# Patient Record
Sex: FEMALE | Race: WHITE | Employment: OTHER | RURAL
[De-identification: names, ages, dates, MRNs, and addresses within clinical notes are randomized per-mention and may not be internally consistent; named-entity substitution may affect disease eponyms.]

---

## 2017-04-11 ENCOUNTER — HOSPITAL ENCOUNTER (OUTPATIENT)
Dept: GENERAL RADIOLOGY | Age: 82
Discharge: HOME | End: 2017-04-11
Attending: PHYSICIAN ASSISTANT | Admitting: PHYSICIAN ASSISTANT

## 2017-04-11 DIAGNOSIS — R22.31 ARM MASS, RIGHT: ICD-10-CM

## 2017-04-11 DIAGNOSIS — R22.31 LOCALIZED SWELLING, MASS AND LUMP, RIGHT UPPER LIMB: ICD-10-CM

## 2017-04-11 DIAGNOSIS — M79.601 RIGHT ARM PAIN: ICD-10-CM

## 2017-06-27 ENCOUNTER — HOSPITAL ENCOUNTER (OUTPATIENT)
Dept: OTHER | Age: 82
Discharge: HOME | End: 2017-06-27
Attending: PHYSICIAN ASSISTANT | Admitting: PHYSICIAN ASSISTANT

## 2017-06-27 DIAGNOSIS — Z12.31 ENCOUNTER FOR SCREENING MAMMOGRAM FOR BREAST CANCER: ICD-10-CM

## 2017-06-27 DIAGNOSIS — Z12.39 BREAST CANCER SCREENING: ICD-10-CM

## 2018-04-08 ENCOUNTER — APPOINTMENT (OUTPATIENT)
Dept: CT IMAGING | Facility: HOSPITAL | Age: 83
End: 2018-04-08

## 2018-04-08 ENCOUNTER — HOSPITAL ENCOUNTER (EMERGENCY)
Facility: HOSPITAL | Age: 83
Discharge: HOME | End: 2018-04-08
Attending: EMERGENCY MEDICINE | Admitting: EMERGENCY MEDICINE

## 2018-04-08 VITALS
SYSTOLIC BLOOD PRESSURE: 119 MMHG | TEMPERATURE: 97.8 F | OXYGEN SATURATION: 98 % | RESPIRATION RATE: 18 BRPM | HEIGHT: 59 IN | WEIGHT: 143 LBS | HEART RATE: 56 BPM | DIASTOLIC BLOOD PRESSURE: 98 MMHG | BODY MASS INDEX: 28.83 KG/M2

## 2018-04-08 DIAGNOSIS — N30.00 ACUTE CYSTITIS WITHOUT HEMATURIA: Primary | ICD-10-CM

## 2018-04-08 LAB
ALBUMIN SERPL-MCNC: 3.6 G/DL (ref 3.5–5)
ALBUMIN/GLOB SERPL: 1 G/DL (ref 1–2)
ALP SERPL-CCNC: 74 U/L (ref 38–126)
ALT SERPL W P-5'-P-CCNC: 44 U/L (ref 13–69)
ANION GAP SERPL CALCULATED.3IONS-SCNC: 18.6 MMOL/L (ref 10–20)
AST SERPL-CCNC: 37 U/L (ref 15–46)
BACTERIA UR QL AUTO: ABNORMAL /HPF
BASOPHILS # BLD AUTO: 0.02 10*3/MM3 (ref 0–0.2)
BASOPHILS NFR BLD AUTO: 0.2 % (ref 0–2.5)
BILIRUB SERPL-MCNC: 0.5 MG/DL (ref 0.2–1.3)
BILIRUB UR QL STRIP: NEGATIVE
BUN BLD-MCNC: 61 MG/DL (ref 7–20)
BUN/CREAT SERPL: 21 (ref 7.1–23.5)
CALCIUM SPEC-SCNC: 9.3 MG/DL (ref 8.4–10.2)
CHLORIDE SERPL-SCNC: 102 MMOL/L (ref 98–107)
CLARITY UR: ABNORMAL
CO2 SERPL-SCNC: 23 MMOL/L (ref 26–30)
COLOR UR: YELLOW
CREAT BLD-MCNC: 2.9 MG/DL (ref 0.6–1.3)
DEPRECATED RDW RBC AUTO: 44 FL (ref 37–54)
EOSINOPHIL # BLD AUTO: 0.28 10*3/MM3 (ref 0–0.7)
EOSINOPHIL NFR BLD AUTO: 3.3 % (ref 0–7)
ERYTHROCYTE [DISTWIDTH] IN BLOOD BY AUTOMATED COUNT: 13.3 % (ref 11.5–14.5)
GFR SERPL CREATININE-BSD FRML MDRD: 15 ML/MIN/1.73
GLOBULIN UR ELPH-MCNC: 3.7 GM/DL
GLUCOSE BLD-MCNC: 197 MG/DL (ref 74–98)
GLUCOSE UR STRIP-MCNC: NEGATIVE MG/DL
HCT VFR BLD AUTO: 35 % (ref 37–47)
HGB BLD-MCNC: 11.4 G/DL (ref 12–16)
HGB UR QL STRIP.AUTO: ABNORMAL
HOLD SPECIMEN: NORMAL
HOLD SPECIMEN: NORMAL
HYALINE CASTS UR QL AUTO: ABNORMAL /LPF
IMM GRANULOCYTES # BLD: 0.02 10*3/MM3 (ref 0–0.06)
IMM GRANULOCYTES NFR BLD: 0.2 % (ref 0–0.6)
KETONES UR QL STRIP: ABNORMAL
LEUKOCYTE ESTERASE UR QL STRIP.AUTO: ABNORMAL
LYMPHOCYTES # BLD AUTO: 1.56 10*3/MM3 (ref 0.6–3.4)
LYMPHOCYTES NFR BLD AUTO: 18.6 % (ref 10–50)
MCH RBC QN AUTO: 29.4 PG (ref 27–31)
MCHC RBC AUTO-ENTMCNC: 32.6 G/DL (ref 30–37)
MCV RBC AUTO: 90.2 FL (ref 81–99)
MONOCYTES # BLD AUTO: 1.51 10*3/MM3 (ref 0–0.9)
MONOCYTES NFR BLD AUTO: 18 % (ref 0–12)
NEUTROPHILS # BLD AUTO: 5 10*3/MM3 (ref 2–6.9)
NEUTROPHILS NFR BLD AUTO: 59.7 % (ref 37–80)
NITRITE UR QL STRIP: NEGATIVE
NRBC BLD MANUAL-RTO: 0 /100 WBC (ref 0–0)
PH UR STRIP.AUTO: <=5 [PH] (ref 5–8)
PLATELET # BLD AUTO: 140 10*3/MM3 (ref 130–400)
PMV BLD AUTO: 11.4 FL (ref 6–12)
POTASSIUM BLD-SCNC: 4.6 MMOL/L (ref 3.5–5.1)
PROT SERPL-MCNC: 7.3 G/DL (ref 6.3–8.2)
PROT UR QL STRIP: ABNORMAL
RBC # BLD AUTO: 3.88 10*6/MM3 (ref 4.2–5.4)
RBC # UR: ABNORMAL /HPF
REF LAB TEST METHOD: ABNORMAL
SODIUM BLD-SCNC: 139 MMOL/L (ref 137–145)
SP GR UR STRIP: >=1.03 (ref 1–1.03)
SQUAMOUS #/AREA URNS HPF: ABNORMAL /HPF
TROPONIN I SERPL-MCNC: <0.012 NG/ML (ref 0–0.03)
UROBILINOGEN UR QL STRIP: ABNORMAL
WBC NRBC COR # BLD: 8.39 10*3/MM3 (ref 4.8–10.8)
WBC UR QL AUTO: ABNORMAL /HPF
WHOLE BLOOD HOLD SPECIMEN: NORMAL
WHOLE BLOOD HOLD SPECIMEN: NORMAL

## 2018-04-08 PROCEDURE — 87086 URINE CULTURE/COLONY COUNT: CPT | Performed by: EMERGENCY MEDICINE

## 2018-04-08 PROCEDURE — 99283 EMERGENCY DEPT VISIT LOW MDM: CPT

## 2018-04-08 PROCEDURE — 87186 SC STD MICRODIL/AGAR DIL: CPT | Performed by: EMERGENCY MEDICINE

## 2018-04-08 PROCEDURE — 84484 ASSAY OF TROPONIN QUANT: CPT | Performed by: EMERGENCY MEDICINE

## 2018-04-08 PROCEDURE — 85025 COMPLETE CBC W/AUTO DIFF WBC: CPT | Performed by: EMERGENCY MEDICINE

## 2018-04-08 PROCEDURE — 80053 COMPREHEN METABOLIC PANEL: CPT | Performed by: EMERGENCY MEDICINE

## 2018-04-08 PROCEDURE — 81001 URINALYSIS AUTO W/SCOPE: CPT | Performed by: EMERGENCY MEDICINE

## 2018-04-08 PROCEDURE — 87077 CULTURE AEROBIC IDENTIFY: CPT | Performed by: EMERGENCY MEDICINE

## 2018-04-08 PROCEDURE — 93005 ELECTROCARDIOGRAM TRACING: CPT | Performed by: EMERGENCY MEDICINE

## 2018-04-08 PROCEDURE — 70450 CT HEAD/BRAIN W/O DYE: CPT

## 2018-04-08 RX ORDER — GLIPIZIDE 10 MG/1
20 TABLET ORAL
COMMUNITY

## 2018-04-08 RX ORDER — TRIAMTERENE AND HYDROCHLOROTHIAZIDE 37.5; 25 MG/1; MG/1
1 CAPSULE ORAL 3 TIMES WEEKLY
COMMUNITY
End: 2018-04-20 | Stop reason: HOSPADM

## 2018-04-08 RX ORDER — ACETAMINOPHEN 325 MG/1
650 TABLET ORAL EVERY 6 HOURS PRN
COMMUNITY

## 2018-04-08 RX ORDER — DILTIAZEM HYDROCHLORIDE 120 MG/1
120 TABLET, FILM COATED ORAL DAILY
COMMUNITY
End: 2018-04-20 | Stop reason: HOSPADM

## 2018-04-08 RX ORDER — CEPHALEXIN 500 MG/1
500 CAPSULE ORAL 2 TIMES DAILY
Qty: 14 CAPSULE | Refills: 0 | Status: SHIPPED | OUTPATIENT
Start: 2018-04-08 | End: 2018-04-20 | Stop reason: HOSPADM

## 2018-04-08 RX ORDER — ECHINACEA 400 MG
2000 CAPSULE ORAL 2 TIMES DAILY
COMMUNITY
End: 2022-12-19

## 2018-04-08 RX ORDER — CHLORAL HYDRATE 500 MG
1200 CAPSULE ORAL 2 TIMES DAILY WITH MEALS
COMMUNITY
End: 2022-12-19

## 2018-04-08 RX ORDER — LEVOTHYROXINE SODIUM 88 UG/1
100 TABLET ORAL DAILY
COMMUNITY
End: 2020-10-01

## 2018-04-08 RX ORDER — QUINAPRIL 20 MG/1
10 TABLET ORAL NIGHTLY
COMMUNITY
End: 2018-04-20 | Stop reason: HOSPADM

## 2018-04-08 RX ORDER — LANOLIN ALCOHOL/MO/W.PET/CERES
1000 CREAM (GRAM) TOPICAL
COMMUNITY

## 2018-04-08 RX ORDER — ASPIRIN 81 MG/1
81 TABLET ORAL DAILY
COMMUNITY

## 2018-04-08 RX ORDER — ASCORBIC ACID 500 MG
1000 TABLET ORAL DAILY
COMMUNITY

## 2018-04-08 RX ORDER — RANITIDINE 150 MG/1
150 TABLET ORAL 2 TIMES DAILY
COMMUNITY
End: 2020-10-01

## 2018-04-08 RX ORDER — SIMVASTATIN 10 MG
10 TABLET ORAL NIGHTLY
COMMUNITY

## 2018-04-08 RX ORDER — CEPHALEXIN 250 MG/1
500 CAPSULE ORAL ONCE
Status: COMPLETED | OUTPATIENT
Start: 2018-04-08 | End: 2018-04-08

## 2018-04-08 RX ORDER — IRON POLYSACCHARIDE COMPLEX 150 MG
150 CAPSULE ORAL DAILY
COMMUNITY
End: 2022-08-30

## 2018-04-08 RX ADMIN — CEPHALEXIN 500 MG: 250 CAPSULE ORAL at 18:53

## 2018-04-08 NOTE — ED PROVIDER NOTES
Subjective   History of Present Illness   87-year-old female history of diabetes, hypertension, coronary artery disease, and kidney disease presenting with a week of fatigue, generalized weakness, body aches, and several days of dysuria, nausea and a simple fall 3 days ago.  States that she lost her balance and fell hitting her head on the floor. Denies any loss of consciousness, subsequent vomiting, memory loss, changes in vision, weakness or numbness in arms or legs or use of blood thinners.  She is supposed to use a walker but refuses to do so.      Review of Systems   Constitutional: Positive for fatigue.   Gastrointestinal: Positive for nausea.   Genitourinary: Positive for dysuria.   Musculoskeletal: Positive for myalgias.   All other systems reviewed and are negative.      Past Medical History:   Diagnosis Date   • Diabetes mellitus    • Hyperlipidemia    • Hypertension    • Myocardial infarction    • Renal failure        No Known Allergies    Past Surgical History:   Procedure Laterality Date   • CATARACT EXTRACTION     •  SECTION         History reviewed. No pertinent family history.    Social History     Social History Main Topics   • Smoking status: Never Smoker   • Alcohol use No   • Drug use: No   • Sexual activity: Defer     Other Topics Concern   • Not on file           Objective   Physical Exam   Constitutional: She is oriented to person, place, and time. She appears well-developed and well-nourished. No distress.   HENT:   Head: Normocephalic.   Mouth/Throat: Oropharynx is clear and moist. No oropharyngeal exudate.   Ecchymosis over left parietal scalp. No hemotympanum, montgomery's sign nor raccoon eyes.      Eyes: EOM are normal. No scleral icterus.   Neck: Neck supple. No tracheal deviation present.   No tenderness palpation of the midline of the cervical spine.   Cardiovascular: Normal rate, regular rhythm, normal heart sounds and intact distal pulses.  Exam reveals no gallop and no friction  rub.    No murmur heard.  Pulmonary/Chest: Effort normal and breath sounds normal. No stridor. No respiratory distress. She has no wheezes. She has no rales. She exhibits no tenderness.   Abdominal: Soft. She exhibits no distension and no mass. There is no tenderness. There is no rebound and no guarding. No hernia.   Musculoskeletal: She exhibits no edema or deformity.   Neurological: She is alert and oriented to person, place, and time. No cranial nerve deficit.   Strength and sensation intact to BUE / BLE   Skin: Skin is warm and dry. She is not diaphoretic. No erythema. No pallor.   Skin tear and left elbow with surrounding ecchymosis.   Psychiatric: She has a normal mood and affect. Her behavior is normal.   Nursing note and vitals reviewed.      ECG 12 Lead    Date/Time: 4/8/2018 6:46 PM  Performed by: FAYE RITCHIE  Authorized by: FAYE RITCHIE   Interpreted by physician  Rhythm: sinus bradycardia  Rate: bradycardic  QRS axis: normal  Conduction: conduction normal  ST Segments: ST segments normal  T Waves: T waves normal  Other: no other findings  Q waves: III and aVF  Clinical impression: non-specific ECG  Clinical impression comment: Inferior Q waves                 ED Course  ED Course                  MDM   87-year-old female here with generalized weakness, fatigue, dysuria, had a fall 3 days ago.  Afebrile, vital signs stable.  She does have evidence of trauma over the elbow and the left parietal scalp.  She is nontender in the elbow sober for imaging here.  Get CT scan of the head, brought labs to evaluate for electrolyte abnormalities, anemia, infection, and reassess.    6:42 PM ED scan of the head is unremarkable.  Labs show evidence of urinary tract infection.  Will give a dose of Keflex here and discharge home with a prescription for Keflex. Discussed return to care precautions.     Final diagnoses:   Acute cystitis without hematuria            Faye Ritchie MD  04/08/18 [id number]        Phill Ritchie MD  04/08/18 9571

## 2018-04-11 LAB
BACTERIA SPEC AEROBE CULT: ABNORMAL
BACTERIA SPEC AEROBE CULT: ABNORMAL

## 2018-04-16 ENCOUNTER — HOSPITAL ENCOUNTER (INPATIENT)
Facility: HOSPITAL | Age: 83
LOS: 4 days | Discharge: HOME | End: 2018-04-20
Attending: EMERGENCY MEDICINE | Admitting: INTERNAL MEDICINE

## 2018-04-16 ENCOUNTER — APPOINTMENT (OUTPATIENT)
Dept: GENERAL RADIOLOGY | Facility: HOSPITAL | Age: 83
End: 2018-04-16

## 2018-04-16 DIAGNOSIS — I50.31 ACUTE DIASTOLIC HEART FAILURE (HCC): ICD-10-CM

## 2018-04-16 DIAGNOSIS — R77.8 ELEVATED TROPONIN: ICD-10-CM

## 2018-04-16 DIAGNOSIS — Z74.09 IMPAIRED FUNCTIONAL MOBILITY, BALANCE, GAIT, AND ENDURANCE: ICD-10-CM

## 2018-04-16 DIAGNOSIS — J18.9 PNEUMONIA OF LOWER LOBE DUE TO INFECTIOUS ORGANISM, UNSPECIFIED LATERALITY: Primary | ICD-10-CM

## 2018-04-16 DIAGNOSIS — R79.89 ELEVATED BRAIN NATRIURETIC PEPTIDE (BNP) LEVEL: ICD-10-CM

## 2018-04-16 DIAGNOSIS — J43.9 PULMONARY EMPHYSEMA, UNSPECIFIED EMPHYSEMA TYPE (HCC): ICD-10-CM

## 2018-04-16 DIAGNOSIS — E87.5 HYPERKALEMIA: ICD-10-CM

## 2018-04-16 LAB
ALBUMIN SERPL-MCNC: 4.1 G/DL (ref 3.5–5)
ALBUMIN/GLOB SERPL: 1.1 G/DL (ref 1–2)
ALP SERPL-CCNC: 129 U/L (ref 38–126)
ALT SERPL W P-5'-P-CCNC: 54 U/L (ref 13–69)
ANION GAP SERPL CALCULATED.3IONS-SCNC: 18.4 MMOL/L (ref 10–20)
ARTERIAL PATENCY WRIST A: POSITIVE
AST SERPL-CCNC: 64 U/L (ref 15–46)
BACTERIA UR QL AUTO: ABNORMAL /HPF
BASE EXCESS BLDA CALC-SCNC: -4.1 MMOL/L
BASOPHILS # BLD AUTO: 0.03 10*3/MM3 (ref 0–0.2)
BASOPHILS NFR BLD AUTO: 0.1 % (ref 0–2.5)
BDY SITE: ABNORMAL
BILIRUB SERPL-MCNC: 0.5 MG/DL (ref 0.2–1.3)
BILIRUB UR QL STRIP: NEGATIVE
BUN BLD-MCNC: 60 MG/DL (ref 7–20)
BUN/CREAT SERPL: 37.5 (ref 7.1–23.5)
CALCIUM SPEC-SCNC: 10.5 MG/DL (ref 8.4–10.2)
CHLORIDE SERPL-SCNC: 105 MMOL/L (ref 98–107)
CLARITY UR: CLEAR
CO2 SERPL-SCNC: 24 MMOL/L (ref 26–30)
COHGB MFR BLD: 0.9 %
COLOR UR: YELLOW
CREAT BLD-MCNC: 1.6 MG/DL (ref 0.6–1.3)
D-LACTATE SERPL-SCNC: 2.4 MMOL/L (ref 0.5–2)
D-LACTATE SERPL-SCNC: 2.8 MMOL/L (ref 0.5–2)
DEPRECATED RDW RBC AUTO: 46.3 FL (ref 37–54)
EOSINOPHIL # BLD AUTO: 0 10*3/MM3 (ref 0–0.7)
EOSINOPHIL NFR BLD AUTO: 0 % (ref 0–7)
ERYTHROCYTE [DISTWIDTH] IN BLOOD BY AUTOMATED COUNT: 13.7 % (ref 11.5–14.5)
GFR SERPL CREATININE-BSD FRML MDRD: 30 ML/MIN/1.73
GLOBULIN UR ELPH-MCNC: 3.9 GM/DL
GLUCOSE BLD-MCNC: 347 MG/DL (ref 74–98)
GLUCOSE BLDC GLUCOMTR-MCNC: 348 MG/DL (ref 70–130)
GLUCOSE UR STRIP-MCNC: ABNORMAL MG/DL
HCO3 BLDA-SCNC: 20.9 MMOL/L (ref 22–28)
HCT VFR BLD AUTO: 39.3 % (ref 37–47)
HGB BLD-MCNC: 12.8 G/DL (ref 12–16)
HGB BLDA-MCNC: 12.5 G/DL (ref 12–18)
HGB UR QL STRIP.AUTO: ABNORMAL
HOLD SPECIMEN: NORMAL
HOROWITZ INDEX BLD+IHG-RTO: 21 %
HYALINE CASTS UR QL AUTO: ABNORMAL /LPF
IMM GRANULOCYTES # BLD: 0.22 10*3/MM3 (ref 0–0.06)
IMM GRANULOCYTES NFR BLD: 1 % (ref 0–0.6)
KETONES UR QL STRIP: NEGATIVE
LEUKOCYTE ESTERASE UR QL STRIP.AUTO: NEGATIVE
LYMPHOCYTES # BLD AUTO: 1.83 10*3/MM3 (ref 0.6–3.4)
LYMPHOCYTES NFR BLD AUTO: 8.1 % (ref 10–50)
MCH RBC QN AUTO: 29.5 PG (ref 27–31)
MCHC RBC AUTO-ENTMCNC: 32.6 G/DL (ref 30–37)
MCV RBC AUTO: 90.6 FL (ref 81–99)
METHGB BLD QL: 0.9 %
MODALITY: ABNORMAL
MONOCYTES # BLD AUTO: 2.33 10*3/MM3 (ref 0–0.9)
MONOCYTES NFR BLD AUTO: 10.3 % (ref 0–12)
MUCOUS THREADS URNS QL MICRO: ABNORMAL /HPF
NEUTROPHILS # BLD AUTO: 18.21 10*3/MM3 (ref 2–6.9)
NEUTROPHILS NFR BLD AUTO: 80.5 % (ref 37–80)
NITRITE UR QL STRIP: NEGATIVE
NRBC BLD MANUAL-RTO: 0 /100 WBC (ref 0–0)
NT-PROBNP SERPL-MCNC: 7050 PG/ML (ref 0–450)
OXYHGB MFR BLDV: 91.8 % (ref 94–99)
PCO2 BLDA: 34.9 MM HG (ref 35–45)
PH BLDA: 7.39 PH UNITS (ref 7.3–7.5)
PH UR STRIP.AUTO: 6.5 [PH] (ref 5–8)
PLATELET # BLD AUTO: 408 10*3/MM3 (ref 130–400)
PMV BLD AUTO: 10.1 FL (ref 6–12)
PO2 BLDA: 64.7 MM HG (ref 75–100)
POTASSIUM BLD-SCNC: 6.4 MMOL/L (ref 3.5–5.1)
PROT SERPL-MCNC: 8 G/DL (ref 6.3–8.2)
PROT UR QL STRIP: ABNORMAL
RBC # BLD AUTO: 4.34 10*6/MM3 (ref 4.2–5.4)
RBC # UR: ABNORMAL /HPF
REF LAB TEST METHOD: ABNORMAL
SAO2 % BLDCOA: 93.5 %
SODIUM BLD-SCNC: 141 MMOL/L (ref 137–145)
SP GR UR STRIP: 1.02 (ref 1–1.03)
SQUAMOUS #/AREA URNS HPF: ABNORMAL /HPF
TROPONIN I SERPL-MCNC: 0.1 NG/ML (ref 0–0.03)
TROPONIN I SERPL-MCNC: 0.11 NG/ML (ref 0–0.03)
UROBILINOGEN UR QL STRIP: ABNORMAL
WBC NRBC COR # BLD: 22.62 10*3/MM3 (ref 4.8–10.8)
WBC UR QL AUTO: ABNORMAL /HPF
WHOLE BLOOD HOLD SPECIMEN: NORMAL
WHOLE BLOOD HOLD SPECIMEN: NORMAL

## 2018-04-16 PROCEDURE — 25010000002 CEFTRIAXONE PER 250 MG: Performed by: NURSE PRACTITIONER

## 2018-04-16 PROCEDURE — 94799 UNLISTED PULMONARY SVC/PX: CPT

## 2018-04-16 PROCEDURE — 63710000001 INSULIN ASPART PER 5 UNITS: Performed by: INTERNAL MEDICINE

## 2018-04-16 PROCEDURE — 83050 HGB METHEMOGLOBIN QUAN: CPT

## 2018-04-16 PROCEDURE — 83605 ASSAY OF LACTIC ACID: CPT | Performed by: NURSE PRACTITIONER

## 2018-04-16 PROCEDURE — 36600 WITHDRAWAL OF ARTERIAL BLOOD: CPT

## 2018-04-16 PROCEDURE — 63710000001 INSULIN DETEMIR PER 5 UNITS: Performed by: INTERNAL MEDICINE

## 2018-04-16 PROCEDURE — 99223 1ST HOSP IP/OBS HIGH 75: CPT | Performed by: INTERNAL MEDICINE

## 2018-04-16 PROCEDURE — 84484 ASSAY OF TROPONIN QUANT: CPT | Performed by: INTERNAL MEDICINE

## 2018-04-16 PROCEDURE — 87040 BLOOD CULTURE FOR BACTERIA: CPT | Performed by: EMERGENCY MEDICINE

## 2018-04-16 PROCEDURE — 25010000002 ENOXAPARIN PER 10 MG: Performed by: INTERNAL MEDICINE

## 2018-04-16 PROCEDURE — 93005 ELECTROCARDIOGRAM TRACING: CPT | Performed by: INTERNAL MEDICINE

## 2018-04-16 PROCEDURE — 85025 COMPLETE CBC W/AUTO DIFF WBC: CPT | Performed by: EMERGENCY MEDICINE

## 2018-04-16 PROCEDURE — 84484 ASSAY OF TROPONIN QUANT: CPT | Performed by: EMERGENCY MEDICINE

## 2018-04-16 PROCEDURE — 71046 X-RAY EXAM CHEST 2 VIEWS: CPT

## 2018-04-16 PROCEDURE — 81001 URINALYSIS AUTO W/SCOPE: CPT | Performed by: EMERGENCY MEDICINE

## 2018-04-16 PROCEDURE — 63710000001 INSULIN REGULAR HUMAN PER 5 UNITS: Performed by: NURSE PRACTITIONER

## 2018-04-16 PROCEDURE — 80053 COMPREHEN METABOLIC PANEL: CPT | Performed by: EMERGENCY MEDICINE

## 2018-04-16 PROCEDURE — 82375 ASSAY CARBOXYHB QUANT: CPT

## 2018-04-16 PROCEDURE — 82962 GLUCOSE BLOOD TEST: CPT

## 2018-04-16 PROCEDURE — 25010000002 CALCIUM GLUCONATE PER 10 ML: Performed by: NURSE PRACTITIONER

## 2018-04-16 PROCEDURE — 99285 EMERGENCY DEPT VISIT HI MDM: CPT

## 2018-04-16 PROCEDURE — 25010000002 AZITHROMYCIN: Performed by: NURSE PRACTITIONER

## 2018-04-16 PROCEDURE — 93005 ELECTROCARDIOGRAM TRACING: CPT | Performed by: EMERGENCY MEDICINE

## 2018-04-16 PROCEDURE — 94640 AIRWAY INHALATION TREATMENT: CPT

## 2018-04-16 PROCEDURE — 82805 BLOOD GASES W/O2 SATURATION: CPT

## 2018-04-16 PROCEDURE — 25010000002 HYDRALAZINE PER 20 MG: Performed by: INTERNAL MEDICINE

## 2018-04-16 PROCEDURE — 83880 ASSAY OF NATRIURETIC PEPTIDE: CPT | Performed by: EMERGENCY MEDICINE

## 2018-04-16 RX ORDER — METOPROLOL TARTRATE 50 MG/1
50 TABLET, FILM COATED ORAL EVERY 12 HOURS SCHEDULED
Status: DISCONTINUED | OUTPATIENT
Start: 2018-04-16 | End: 2018-04-20 | Stop reason: HOSPADM

## 2018-04-16 RX ORDER — HYDRALAZINE HYDROCHLORIDE 20 MG/ML
20 INJECTION INTRAMUSCULAR; INTRAVENOUS EVERY 6 HOURS PRN
Status: DISCONTINUED | OUTPATIENT
Start: 2018-04-16 | End: 2018-04-20 | Stop reason: HOSPADM

## 2018-04-16 RX ORDER — LABETALOL HYDROCHLORIDE 5 MG/ML
10 INJECTION, SOLUTION INTRAVENOUS ONCE
Status: COMPLETED | OUTPATIENT
Start: 2018-04-16 | End: 2018-04-16

## 2018-04-16 RX ORDER — IPRATROPIUM BROMIDE AND ALBUTEROL SULFATE 2.5; .5 MG/3ML; MG/3ML
3 SOLUTION RESPIRATORY (INHALATION) EVERY 4 HOURS PRN
Status: DISCONTINUED | OUTPATIENT
Start: 2018-04-16 | End: 2018-04-20 | Stop reason: HOSPADM

## 2018-04-16 RX ORDER — ONDANSETRON 2 MG/ML
4 INJECTION INTRAMUSCULAR; INTRAVENOUS EVERY 6 HOURS PRN
Status: DISCONTINUED | OUTPATIENT
Start: 2018-04-16 | End: 2018-04-20 | Stop reason: HOSPADM

## 2018-04-16 RX ORDER — CHOLECALCIFEROL (VITAMIN D3) 125 MCG
1000 CAPSULE ORAL
Status: DISCONTINUED | OUTPATIENT
Start: 2018-04-17 | End: 2018-04-20 | Stop reason: HOSPADM

## 2018-04-16 RX ORDER — SODIUM CHLORIDE 9 MG/ML
100 INJECTION, SOLUTION INTRAVENOUS CONTINUOUS
Status: DISCONTINUED | OUTPATIENT
Start: 2018-04-16 | End: 2018-04-17

## 2018-04-16 RX ORDER — ACETAMINOPHEN 325 MG/1
650 TABLET ORAL EVERY 4 HOURS PRN
Status: DISCONTINUED | OUTPATIENT
Start: 2018-04-16 | End: 2018-04-20 | Stop reason: HOSPADM

## 2018-04-16 RX ORDER — SENNA AND DOCUSATE SODIUM 50; 8.6 MG/1; MG/1
2 TABLET, FILM COATED ORAL NIGHTLY
Status: DISCONTINUED | OUTPATIENT
Start: 2018-04-16 | End: 2018-04-20 | Stop reason: HOSPADM

## 2018-04-16 RX ORDER — LEVOTHYROXINE SODIUM 88 UG/1
88 TABLET ORAL
Status: DISCONTINUED | OUTPATIENT
Start: 2018-04-17 | End: 2018-04-20 | Stop reason: HOSPADM

## 2018-04-16 RX ORDER — ASCORBIC ACID 500 MG
1000 TABLET ORAL DAILY
Status: DISCONTINUED | OUTPATIENT
Start: 2018-04-17 | End: 2018-04-20 | Stop reason: HOSPADM

## 2018-04-16 RX ORDER — IPRATROPIUM BROMIDE AND ALBUTEROL SULFATE 2.5; .5 MG/3ML; MG/3ML
3 SOLUTION RESPIRATORY (INHALATION) ONCE
Status: COMPLETED | OUTPATIENT
Start: 2018-04-16 | End: 2018-04-16

## 2018-04-16 RX ORDER — CALCIUM GLUCONATE 94 MG/ML
1 INJECTION, SOLUTION INTRAVENOUS ONCE
Status: COMPLETED | OUTPATIENT
Start: 2018-04-16 | End: 2018-04-16

## 2018-04-16 RX ORDER — SODIUM POLYSTYRENE SULFONATE 15 G/60ML
30 SUSPENSION ORAL; RECTAL ONCE
Status: COMPLETED | OUTPATIENT
Start: 2018-04-16 | End: 2018-04-16

## 2018-04-16 RX ORDER — IRON POLYSACCHARIDE COMPLEX 150 MG
150 CAPSULE ORAL DAILY
Status: DISCONTINUED | OUTPATIENT
Start: 2018-04-17 | End: 2018-04-20 | Stop reason: HOSPADM

## 2018-04-16 RX ORDER — MULTIPLE VITAMINS W/ MINERALS TAB 9MG-400MCG
1 TAB ORAL DAILY
Status: DISCONTINUED | OUTPATIENT
Start: 2018-04-17 | End: 2018-04-20 | Stop reason: HOSPADM

## 2018-04-16 RX ORDER — SODIUM CHLORIDE 0.9 % (FLUSH) 0.9 %
10 SYRINGE (ML) INJECTION AS NEEDED
Status: DISCONTINUED | OUTPATIENT
Start: 2018-04-16 | End: 2018-04-20 | Stop reason: HOSPADM

## 2018-04-16 RX ORDER — ONDANSETRON 4 MG/1
4 TABLET, FILM COATED ORAL EVERY 6 HOURS PRN
Status: DISCONTINUED | OUTPATIENT
Start: 2018-04-16 | End: 2018-04-20 | Stop reason: HOSPADM

## 2018-04-16 RX ORDER — ASPIRIN 81 MG/1
81 TABLET ORAL DAILY
Status: DISCONTINUED | OUTPATIENT
Start: 2018-04-17 | End: 2018-04-20 | Stop reason: HOSPADM

## 2018-04-16 RX ORDER — DEXTROSE MONOHYDRATE 25 G/50ML
25 INJECTION, SOLUTION INTRAVENOUS
Status: DISCONTINUED | OUTPATIENT
Start: 2018-04-16 | End: 2018-04-20 | Stop reason: HOSPADM

## 2018-04-16 RX ORDER — DEXTROSE MONOHYDRATE 25 G/50ML
50 INJECTION, SOLUTION INTRAVENOUS
Status: DISCONTINUED | OUTPATIENT
Start: 2018-04-16 | End: 2018-04-20 | Stop reason: HOSPADM

## 2018-04-16 RX ORDER — SODIUM CHLORIDE 0.9 % (FLUSH) 0.9 %
1-10 SYRINGE (ML) INJECTION AS NEEDED
Status: DISCONTINUED | OUTPATIENT
Start: 2018-04-16 | End: 2018-04-20 | Stop reason: HOSPADM

## 2018-04-16 RX ORDER — ONDANSETRON 4 MG/1
4 TABLET, ORALLY DISINTEGRATING ORAL EVERY 6 HOURS PRN
Status: DISCONTINUED | OUTPATIENT
Start: 2018-04-16 | End: 2018-04-20 | Stop reason: HOSPADM

## 2018-04-16 RX ORDER — FAMOTIDINE 20 MG/1
20 TABLET, FILM COATED ORAL DAILY
Status: DISCONTINUED | OUTPATIENT
Start: 2018-04-16 | End: 2018-04-20 | Stop reason: HOSPADM

## 2018-04-16 RX ORDER — ATORVASTATIN CALCIUM 10 MG/1
10 TABLET, FILM COATED ORAL NIGHTLY
Status: DISCONTINUED | OUTPATIENT
Start: 2018-04-16 | End: 2018-04-20 | Stop reason: HOSPADM

## 2018-04-16 RX ORDER — NICOTINE POLACRILEX 4 MG
1 LOZENGE BUCCAL
Status: DISCONTINUED | OUTPATIENT
Start: 2018-04-16 | End: 2018-04-20 | Stop reason: HOSPADM

## 2018-04-16 RX ADMIN — CEFTRIAXONE 1 G: 1 INJECTION, SOLUTION INTRAVENOUS at 14:13

## 2018-04-16 RX ADMIN — DEXTROSE MONOHYDRATE 50 ML: 25 INJECTION, SOLUTION INTRAVENOUS at 14:43

## 2018-04-16 RX ADMIN — SODIUM POLYSTYRENE SULFONATE 30 G: 15 SUSPENSION ORAL; RECTAL at 20:32

## 2018-04-16 RX ADMIN — SODIUM CHLORIDE 100 ML/HR: 9 INJECTION, SOLUTION INTRAVENOUS at 18:25

## 2018-04-16 RX ADMIN — SODIUM CHLORIDE 1000 ML: 9 INJECTION, SOLUTION INTRAVENOUS at 14:12

## 2018-04-16 RX ADMIN — HUMAN INSULIN 10 UNITS: 100 INJECTION, SOLUTION SUBCUTANEOUS at 14:13

## 2018-04-16 RX ADMIN — Medication 2 TABLET: at 20:32

## 2018-04-16 RX ADMIN — METOPROLOL TARTRATE 50 MG: 50 TABLET ORAL at 20:33

## 2018-04-16 RX ADMIN — NITROGLYCERIN 1 INCH: 20 OINTMENT TOPICAL at 20:59

## 2018-04-16 RX ADMIN — IPRATROPIUM BROMIDE AND ALBUTEROL SULFATE 3 ML: .5; 3 SOLUTION RESPIRATORY (INHALATION) at 13:09

## 2018-04-16 RX ADMIN — CALCIUM GLUCONATE 1 G: 98 INJECTION, SOLUTION INTRAVENOUS at 14:16

## 2018-04-16 RX ADMIN — Medication 20 MG: at 18:36

## 2018-04-16 RX ADMIN — ACETAMINOPHEN 650 MG: 325 TABLET, FILM COATED ORAL at 20:33

## 2018-04-16 RX ADMIN — INSULIN DETEMIR 55 UNITS: 100 INJECTION, SOLUTION SUBCUTANEOUS at 21:00

## 2018-04-16 RX ADMIN — INSULIN ASPART 7 UNITS: 100 INJECTION, SOLUTION INTRAVENOUS; SUBCUTANEOUS at 21:00

## 2018-04-16 RX ADMIN — FAMOTIDINE 20 MG: 20 TABLET ORAL at 18:36

## 2018-04-16 RX ADMIN — ENOXAPARIN SODIUM 30 MG: 30 INJECTION SUBCUTANEOUS at 18:36

## 2018-04-16 RX ADMIN — ATORVASTATIN CALCIUM 10 MG: 10 TABLET, FILM COATED ORAL at 20:33

## 2018-04-16 RX ADMIN — AZITHROMYCIN 500 MG: 500 INJECTION, POWDER, LYOPHILIZED, FOR SOLUTION INTRAVENOUS at 14:24

## 2018-04-16 RX ADMIN — LABETALOL HYDROCHLORIDE 10 MG: 5 INJECTION, SOLUTION INTRAVENOUS at 15:00

## 2018-04-16 NOTE — H&P
"    [facility] Medicine Services  HISTORY AND PHYSICAL    Primary Care Physician: ASAD Lo    Subjective     Chief Complaint:    Shortness of breath and cough    History of Present Illness:     I have reviewed labs/imaging/records from this hospitalization, including ER staff to establish a comprehensive understanding of this patient's clinical issues, as well as to establish plan of care appropriately.     Patient is an extremely pleasant 87-year-old female with medical history of coronary artery disease with history of inferior MI and stent to RCA in 1996, atrial fibrillation not on anticoagulation, hypertension, dyslipidemia and type 2 diabetes mellitus on long-term insulin therapy who presented to the ER with complaints of \"smothering\"  and cough.  Patient states that she's been in her usual state of health and has been doing fairly well until yesterday when she started to develop a cough that is hacking in nature and nonproductive, but continuous causing her to have soreness of her chest muscles. She also states that she was feeling so short of breath last night that she was unable to sleep. She denies any fever or chills. She has not had any sick contact. She denies any palpitations, chest pain, dizziness, leg swelling, abdominal pain diarrhea or dysuria. She does complain of constipation with hard stools. She was seen in the ER 1 week ago for an UTI.  She has not been hospitalized in over 10 years per patient. She follows with Dr. Peoples who's her cardiologist, but has not seen him in over 3 years.     Patient was noted to be hypertensive to 200/100 on arrival with normal heart rate and temperature. She was saturating around 90s on RA. Labs are notable for lactate of 2.4, blood glucose 347, BUN/Cr 60/1.6, this is better than her baaseline, potassium 6.4, Calcium 10.4, proBNP 7050, troponin 0.108, wbc 22K and negative urinalysis for UTI. EKG with nonspecific findings. CXR shows " left lung patchy airspace disease. ABG on room air shows pH 7.38, Co2 34 and O2 65. She was given calcium gluconate, 10units of regular insulin with dextrose as well as IV Ceftriaxone and Azithromycin. She also received one dose of IV Labetalol 10mg. She was given 1L IVF bolus as well. She is being admitted to the hospitalist service for further work up and management of sepsis suspected to be due to pneumonia.     Review of Systems   Constitutional: Negative for activity change, appetite change, chills, fatigue and fever.   HENT: Positive for congestion.    Eyes: Negative for photophobia.   Respiratory: Positive for cough and shortness of breath. Negative for apnea, chest tightness and wheezing.    Cardiovascular: Negative for chest pain, palpitations and leg swelling.   Gastrointestinal: Positive for constipation. Negative for abdominal pain, blood in stool, diarrhea, nausea and vomiting.   Endocrine: Negative for polydipsia and polyuria.   Genitourinary: Negative for difficulty urinating, dysuria and hematuria.   Musculoskeletal: Negative for back pain.   Skin: Negative for pallor, rash and wound.   Neurological: Negative for dizziness, syncope and numbness.   Psychiatric/Behavioral: Negative for agitation and confusion.        Past Medical History:   Past Medical History:   Diagnosis Date   • Diabetes mellitus    • Hyperlipidemia    • Hypertension    • Myocardial infarction    • Renal failure        Past Surgical History:  Past Surgical History:   Procedure Laterality Date   • CATARACT EXTRACTION     •  SECTION         Family History: family history is not on file.    Social History:  reports that she has never smoked. She does not have any smokeless tobacco history on file. She reports that she does not drink alcohol or use drugs.    Medications:    (Not in a hospital admission)    Allergies:  No Known Allergies      Objective     Physical Exam:  Vital Signs: /74   Pulse 76   Temp 97.9 °F (36.6  "°C) (Oral)   Resp 17   Ht 149.9 cm (59\")   Wt 62.4 kg (137 lb 9.6 oz)   SpO2 96%   BMI 27.79 kg/m²      Physical Exam:     General Appearance:   Alert, cooperative, in no acute distress.     Head:   Normocephalic, without obvious abnormality, atraumatic.     Eyes:       Normal, conjunctivae and sclerae, no icterus, no pallor, corneas clear, PERRLA        Throat:   Oral mucosa dry      Neck:  No adenopathy, supple, trachea midline, no thyromegaly, no carotid bruit, no JVD      Back:   No CVA tenderness on Percussion.     Lungs:   Fair air entry bilaterally, mild crackles bilaterally with     Heart:   Regular rhythm and normal rate, normal S1 and S2.       Abdomen:   Obese. Normal bowel sounds, no masses, no organomegaly, soft non-tender, non-distended, no guarding, no rebound tenderness        Extremities:  Moves all extremities, no edema, no cyanosis, no redness.     Pulses:  Pulses palpable and equal bilaterally but weak.     Skin:  No bleeding, bruising or rash        Neurologic:  Cranial nerves grossly intact, move all extremities         Results Review:  Lab Results (last 7 days)     Procedure Component Value Units Date/Time    [place] Draw [[id number]] Collected:  04/16/18 1243    Specimen:  Blood Updated:  04/16/18 1532    Narrative:       The following orders were created for panel order [place] Draw.  Procedure                               Abnormality         Status                     ---------                               -----------         ------                     Light Blue Top[[id number]]                                   Final result               Lavender Top[[id number]]                                     Final result               Gold Top - SST[[id number]]                                   Final result               Green Top (No Gel)[[id number]]                               Final result                 Please view results for these tests on the individual orders.    Lavender Top [[id number]] " Collected:  04/16/18 1243    Specimen:  Blood Updated:  04/16/18 1532     Extra Tube hold for add-on     Comment: Auto resulted       Light Blue Top [[id number]] Collected:  04/16/18 1243    Specimen:  Blood Updated:  04/16/18 1532     Extra Tube hold for add-on     Comment: Auto resulted       Gold Top - SST [[id number]] Collected:  04/16/18 1243    Specimen:  Blood Updated:  04/16/18 1532     Extra Tube Hold for add-ons.     Comment: Auto resulted.       Green Top (No Gel) [[id number]] Collected:  04/16/18 1243    Specimen:  Blood Updated:  04/16/18 1532     Extra Tube Hold for add-ons.     Comment: Auto resulted.       Lactic Acid, Plasma [[id number]]  (Abnormal) Collected:  04/16/18 1333    Specimen:  Blood Updated:  04/16/18 1421     Lactate 2.4 (C) mmol/L     Lactic Acid, Reflex Timer (This will reflex a repeat order 3-3:15 hours after ordered.) [[id number]] Collected:  04/16/18 1333    Specimen:  Blood Updated:  04/16/18 1421    Blood Culture With ADRYAN - Blood, [[id number]] Collected:  04/16/18 1333    Specimen:  Blood from Blood, Venous Line Updated:  04/16/18 1415    Blood Culture With ADRYAN - Blood, [[id number]] Collected:  04/16/18 1333    Specimen:  Blood from Blood, Venous Line Updated:  04/16/18 1415    Blood Gas, Arterial With Co-Ox [[id number]]  (Abnormal) Collected:  04/16/18 1345    Specimen:  Arterial Blood Updated:  04/16/18 1343     Site Arterial: right radial     Phill's Test Positive     pH, Arterial 7.386 pH units      pCO2, Arterial 34.9 (L) mm Hg      pO2, Arterial 64.7 (L) mm Hg      HCO3, Arterial 20.9 (L) mmol/L      Base Excess, Arterial -4.1 mmol/L      O2 Saturation, Arterial 93.5 %      Hemoglobin, Blood Gas 12.5 g/dL      Oxyhemoglobin 91.8 (L) %      Methemoglobin 0.90 %      Carboxyhemoglobin 0.9 %      Modality Room Air     FIO2 21 %     Comprehensive Metabolic Panel [[id number]]  (Abnormal) Collected:  04/16/18 1243    Specimen:  Blood Updated:  04/16/18 1315     Glucose 347 (H) mg/dL       Comment: Glucose >180, Hemoglobin A1C recommended.        BUN 60 (H) mg/dL      Creatinine 1.60 (H) mg/dL      Sodium 141 mmol/L      Potassium 6.4 (C) mmol/L      Chloride 105 mmol/L      CO2 24.0 (L) mmol/L      Calcium 10.5 (H) mg/dL      Total Protein 8.0 g/dL      Albumin 4.10 g/dL      ALT (SGPT) 54 U/L      AST (SGOT) 64 (H) U/L      Alkaline Phosphatase 129 (H) U/L      Total Bilirubin 0.5 mg/dL      eGFR Non African Amer 30 (L) mL/min/1.73      Globulin 3.9 gm/dL      A/G Ratio 1.1 g/dL      BUN/Creatinine Ratio 37.5 (H)     Anion Gap 18.4 mmol/L     Narrative:       The MDRD GFR formula is only valid for adults with stable renal function between ages 18 and 70.    BNP [[id number]]  (Abnormal) Collected:  04/16/18 1243    Specimen:  Blood Updated:  04/16/18 1315     proBNP 7,050.0 (C) pg/mL     Urinalysis, Microscopic Only - Urine, Clean Catch [[id number]]  (Abnormal) Collected:  04/16/18 1301    Specimen:  Urine from Urine, Clean Catch Updated:  04/16/18 1315     RBC, UA 3-5 (A) /HPF      WBC, UA 0-2 (A) /HPF      Bacteria, UA Trace (A) /HPF      Squamous Epithelial Cells, UA 0-2 /HPF      Hyaline Casts, UA None Seen /LPF      Mucus, UA Trace /HPF      Methodology Manual Light Microscopy    Troponin [[id number]]  (Abnormal) Collected:  04/16/18 1243    Specimen:  Blood Updated:  04/16/18 1313     Troponin I 0.108 (H) ng/mL     Narrative:       Normal Patient Upper Reference Limit (URL) (99th Percentile)=0.03 ng/mL   Non-AMI Illness Reference Limit=0.03-0.11 ng/mL   AMI Confirmation=0.12 ng/mL and above    Urinalysis With / Culture If Indicated - Urine, Clean Catch [[id number]]  (Abnormal) Collected:  04/16/18 1301    Specimen:  Urine from Urine, Clean Catch Updated:  04/16/18 1306     Color, UA Yellow     Appearance, UA Clear     pH, UA 6.5     Specific Gravity, UA 1.025     Glucose,  mg/dL (1+) (A)     Ketones, UA Negative     Bilirubin, UA Negative     Blood, UA Small (1+) (A)     Protein, UA >=300  mg/dL (3+) (A)     Leuk Esterase, UA Negative     Nitrite, UA Negative     Urobilinogen, UA 0.2 E.U./dL    CBC & Differential [[id number]] Collected:  04/16/18 1243    Specimen:  Blood Updated:  04/16/18 1303    Narrative:       The following orders were created for panel order CBC & Differential.  Procedure                               Abnormality         Status                     ---------                               -----------         ------                     CBC Auto Differential[[id number]]        Abnormal            Final result                 Please view results for these tests on the individual orders.    CBC Auto Differential [[id number]]  (Abnormal) Collected:  04/16/18 1243    Specimen:  Blood Updated:  04/16/18 1303     WBC 22.62 (H) 10*3/mm3      RBC 4.34 10*6/mm3      Hemoglobin 12.8 g/dL      Hematocrit 39.3 %      MCV 90.6 fL      MCH 29.5 pg      MCHC 32.6 g/dL      RDW 13.7 %      RDW-SD 46.3 fl      MPV 10.1 fL      Platelets 408 (H) 10*3/mm3      Neutrophil % 80.5 (H) %      Lymphocyte % 8.1 (L) %      Monocyte % 10.3 %      Eosinophil % 0.0 %      Basophil % 0.1 %      Immature Grans % 1.0 (H) %      Neutrophils, Absolute 18.21 (H) 10*3/mm3      Lymphocytes, Absolute 1.83 10*3/mm3      Monocytes, Absolute 2.33 (H) 10*3/mm3      Eosinophils, Absolute 0.00 10*3/mm3      Basophils, Absolute 0.03 10*3/mm3      Immature Grans, Absolute 0.22 (H) 10*3/mm3      nRBC 0.0 /100 WBC         Imaging Results (last 72 hours)     Procedure Component Value Units Date/Time    XR Chest 2 View [[id number]] Collected:  04/16/18 1255     Updated:  04/16/18 1257    Narrative:       PROCEDURE: XR CHEST 2 VW-        HISTORY: SOA  triage protocol     COMPARISON: None.     FINDINGS: The heart is normal in size. The mediastinum is unremarkable.  There are emphysematous changes to the lungs. There is patchy airspace  disease in the left lung base which may reflect atelectasis or  pneumonia. There is no pneumothorax.  There are no acute osseous  abnormalities.           Impression:       Patchy airspace disease in the left lung base which may  reflect atelectasis or pneumonia.           This report was finalized on 4/16/2018 12:55 PM by Hernesto Dickinson M.D..        EKG: Sinus rhythm, nonspecific ST changes  I have personally reviewed and interpreted available lab data, radiology studies and ECG obtained at time of admission.     Assessment / Plan     Assessment/Problem List:   Active Problems:    Pneumonia of lower lobe due to infectious organism    1. Sepsis due to # 2, present on admission  2. Left lower lobe pneumonia, present on admission  3. Acute hyperkalemia, possibly due to underlying CKD with medication side effect - ACE-I  4. Hyperglycemia   5. Elevated troponin level, likely supply demand mismatch, type 2 MI  6. CKD stage III  7. History of atrial fibrillation, not on anticoagulation, in NSR currently   8. History of coronary artery disease s/p stent   9. Hypertensive urgency, present on admission  10. Hyperlipidemia  11. Hearing impairment     Plan:  Admit to ICU.   Will continue with maintenance dose IVF at 100cc/hr. Will not give any more boluses as patient is extremely hypertensive requiring IV labetalol in the ER. Will continue with Ceftriaxone and Azithromycin. Duonebs PRN.   Will recheck reflex lactic acid and give further IVF if needed.   Will hold ACE-I in light of hyperkalemia and diuretics in light of active infection.   Hydralazine PRN for SBP >170s.   Will obtain 2-D echo to assess LV function and valvular status.   Hold oral hypoglycemics and place on home Levemir dose along with Novolog SS.   Lovenox for DVT ppx.   Patient wishes to be full code at this time, but would not want to remain on ventilator for the rest of her life.     Details were discussed with the patient as well as family in the room.   Further recommendations will depend on clinical course of the patient during the current  hospitalization.      I also discussed the details with the nursing staff.    Rest as ordered.    Anticipated stay is greater than 2 midnights.    Elvira Garcia MD 04/16/18 5:11 PM

## 2018-04-16 NOTE — ED PROVIDER NOTES
Subjective   History of Present Illness  This is an 87-year-old female who has a history of diabetes mellitus, hyperlipidemia, hypertension, renal failure, and coronary artery disease status post myocardial infarction.  She also has a history of atrial fibrillation.  She was seen in our emergency department approximately a week ago and diagnosed with the urinary tract infection.  At that time she was having fatigue and malaise.  She was taking antibiotics for that and then charted developing shortness of breath over the weekend.  This morning she woke up and was extremely short of breath and her chest felt tight.  She has also had worsening malaise and fatigue.  Her appetite is decreased.  So her family brought her to the emergency department.  She denies chest pain, but states that she is very short of breath.  Review of Systems   All other systems reviewed and are negative.      Past Medical History:   Diagnosis Date   • Diabetes mellitus    • Hyperlipidemia    • Hypertension    • Myocardial infarction    • Renal failure        No Known Allergies    Past Surgical History:   Procedure Laterality Date   • CATARACT EXTRACTION     •  SECTION         History reviewed. No pertinent family history.    Social History     Social History   • Marital status:      Social History Main Topics   • Smoking status: Never Smoker   • Alcohol use No   • Drug use: No   • Sexual activity: Defer     Other Topics Concern   • Not on file           Objective   Physical Exam   Constitutional: She is oriented to person, place, and time. She appears well-nourished.   Elderly female in no acute distress   HENT:   Head: Normocephalic and atraumatic.   Mouth/Throat: Oropharynx is clear and moist.   Eyes: Conjunctivae are normal. Pupils are equal, round, and reactive to light.   Neck: Normal range of motion. Neck supple.   Cardiovascular: Normal rate, regular rhythm and intact distal pulses.    Murmur heard.       Pulmonary/Chest: No respiratory distress. She has wheezes. She has no rales. She exhibits no tenderness.   Working slightly hard to bring.   Abdominal: Soft. Bowel sounds are normal. She exhibits no distension. There is no tenderness.   Musculoskeletal: Normal range of motion. She exhibits no edema.   Neurological: She is alert and oriented to person, place, and time.   Skin: Skin is warm and dry. Capillary refill takes less than 2 seconds. No rash noted.   Psychiatric: She has a normal mood and affect. Her behavior is normal. Judgment and thought content normal.   Nursing note and vitals reviewed.      Procedures         ED Course  ED Course   Comment By Time   Potassium is 6.4, patient was given calcium 1 g, 1 amp of D50, and insulin 10 mg IV.  She was given a DuoNeb which improved her shortness of breath.  X-ray demonstrates patchy infiltrates.  BNP is elevated 7000.  The abdomen is 1.6, previously it's been 2.9.  Troponin is mildly elevated at 0.108.  Urine has no significant infection.  Lactic acid is 2.4.  ABG demonstrates a PCO2 of 34.9, and a bicarbonate of 20.9.  Given a liter of fluids.  Cultures were drawn and started on Rocephin 1 g IV and Zithromax 500 mg IV Marcie Conway, APRN 04/16 1623   Blood pressure continued to be elevated at 200/80, given labetalol 10 mg IV.  I spoke with Dr. Garcia and the patient will be admitted to the ICU for further evaluation.  I spoke with the family in the indicated that if her heart stopped they suspect she would not want it to be restarted but he also indicated if there was something that could he helped they would want that done. Marcie Conway, APRN 04/16 1624      She was transferred to the ICU in stable condition.            MDM    Final diagnoses:   Pneumonia of lower lobe due to infectious organism, unspecified laterality   Hyperkalemia   Elevated troponin   Elevated brain natriuretic peptide (BNP) level            Marcie Conway,  APRN  04/16/18 1950

## 2018-04-17 ENCOUNTER — APPOINTMENT (OUTPATIENT)
Dept: CARDIOLOGY | Facility: HOSPITAL | Age: 83
End: 2018-04-17
Attending: INTERNAL MEDICINE

## 2018-04-17 LAB
ALBUMIN SERPL-MCNC: 3 G/DL (ref 3.5–5)
ALBUMIN/GLOB SERPL: 0.9 G/DL (ref 1–2)
ALP SERPL-CCNC: 77 U/L (ref 38–126)
ALT SERPL W P-5'-P-CCNC: 44 U/L (ref 13–69)
ANION GAP SERPL CALCULATED.3IONS-SCNC: 13.2 MMOL/L (ref 10–20)
AST SERPL-CCNC: 38 U/L (ref 15–46)
BASOPHILS # BLD AUTO: 0.02 10*3/MM3 (ref 0–0.2)
BASOPHILS NFR BLD AUTO: 0.2 % (ref 0–2.5)
BH CV ECHO MEAS - EF(MOD-BP): 56 %
BH CV ECHO MEAS - RVSP: 79 MMHG
BILIRUB SERPL-MCNC: 0.3 MG/DL (ref 0.2–1.3)
BUN BLD-MCNC: 46 MG/DL (ref 7–20)
BUN/CREAT SERPL: 38.3 (ref 7.1–23.5)
CALCIUM SPEC-SCNC: 9.6 MG/DL (ref 8.4–10.2)
CHLORIDE SERPL-SCNC: 112 MMOL/L (ref 98–107)
CO2 SERPL-SCNC: 23 MMOL/L (ref 26–30)
CREAT BLD-MCNC: 1.2 MG/DL (ref 0.6–1.3)
DEPRECATED RDW RBC AUTO: 46.7 FL (ref 37–54)
EOSINOPHIL # BLD AUTO: 0.01 10*3/MM3 (ref 0–0.7)
EOSINOPHIL NFR BLD AUTO: 0.1 % (ref 0–7)
ERYTHROCYTE [DISTWIDTH] IN BLOOD BY AUTOMATED COUNT: 13.8 % (ref 11.5–14.5)
GFR SERPL CREATININE-BSD FRML MDRD: 42 ML/MIN/1.73
GLOBULIN UR ELPH-MCNC: 3.2 GM/DL
GLUCOSE BLD-MCNC: 249 MG/DL (ref 74–98)
GLUCOSE BLDC GLUCOMTR-MCNC: 107 MG/DL (ref 70–130)
GLUCOSE BLDC GLUCOMTR-MCNC: 150 MG/DL (ref 70–130)
GLUCOSE BLDC GLUCOMTR-MCNC: 162 MG/DL (ref 70–130)
GLUCOSE BLDC GLUCOMTR-MCNC: 210 MG/DL (ref 70–130)
GLUCOSE BLDC GLUCOMTR-MCNC: 350 MG/DL (ref 70–130)
HCT VFR BLD AUTO: 31.3 % (ref 37–47)
HGB BLD-MCNC: 10 G/DL (ref 12–16)
IMM GRANULOCYTES # BLD: 0.09 10*3/MM3 (ref 0–0.06)
IMM GRANULOCYTES NFR BLD: 0.9 % (ref 0–0.6)
LYMPHOCYTES # BLD AUTO: 1.12 10*3/MM3 (ref 0.6–3.4)
LYMPHOCYTES NFR BLD AUTO: 11.7 % (ref 10–50)
MAXIMAL PREDICTED HEART RATE: 133 BPM
MCH RBC QN AUTO: 29.4 PG (ref 27–31)
MCHC RBC AUTO-ENTMCNC: 31.9 G/DL (ref 30–37)
MCV RBC AUTO: 92.1 FL (ref 81–99)
MONOCYTES # BLD AUTO: 1.2 10*3/MM3 (ref 0–0.9)
MONOCYTES NFR BLD AUTO: 12.5 % (ref 0–12)
NEUTROPHILS # BLD AUTO: 7.13 10*3/MM3 (ref 2–6.9)
NEUTROPHILS NFR BLD AUTO: 74.6 % (ref 37–80)
NRBC BLD MANUAL-RTO: 0 /100 WBC (ref 0–0)
PLATELET # BLD AUTO: 245 10*3/MM3 (ref 130–400)
PMV BLD AUTO: 10.1 FL (ref 6–12)
POTASSIUM BLD-SCNC: 5.2 MMOL/L (ref 3.5–5.1)
PROT SERPL-MCNC: 6.2 G/DL (ref 6.3–8.2)
RBC # BLD AUTO: 3.4 10*6/MM3 (ref 4.2–5.4)
SODIUM BLD-SCNC: 143 MMOL/L (ref 137–145)
STRESS TARGET HR: 113 BPM
TROPONIN I SERPL-MCNC: 0.1 NG/ML (ref 0–0.03)
WBC NRBC COR # BLD: 9.57 10*3/MM3 (ref 4.8–10.8)

## 2018-04-17 PROCEDURE — 99233 SBSQ HOSP IP/OBS HIGH 50: CPT | Performed by: INTERNAL MEDICINE

## 2018-04-17 PROCEDURE — 82962 GLUCOSE BLOOD TEST: CPT

## 2018-04-17 PROCEDURE — 25010000002 HYDRALAZINE PER 20 MG: Performed by: INTERNAL MEDICINE

## 2018-04-17 PROCEDURE — 80053 COMPREHEN METABOLIC PANEL: CPT | Performed by: INTERNAL MEDICINE

## 2018-04-17 PROCEDURE — 94799 UNLISTED PULMONARY SVC/PX: CPT

## 2018-04-17 PROCEDURE — 93306 TTE W/DOPPLER COMPLETE: CPT

## 2018-04-17 PROCEDURE — 94640 AIRWAY INHALATION TREATMENT: CPT

## 2018-04-17 PROCEDURE — 85025 COMPLETE CBC W/AUTO DIFF WBC: CPT | Performed by: INTERNAL MEDICINE

## 2018-04-17 PROCEDURE — 63710000001 INSULIN DETEMIR PER 5 UNITS: Performed by: INTERNAL MEDICINE

## 2018-04-17 PROCEDURE — 25010000002 ENOXAPARIN PER 10 MG: Performed by: INTERNAL MEDICINE

## 2018-04-17 PROCEDURE — 25010000002 AZITHROMYCIN: Performed by: INTERNAL MEDICINE

## 2018-04-17 PROCEDURE — 63710000001 INSULIN ASPART PER 5 UNITS: Performed by: INTERNAL MEDICINE

## 2018-04-17 PROCEDURE — 84484 ASSAY OF TROPONIN QUANT: CPT | Performed by: INTERNAL MEDICINE

## 2018-04-17 PROCEDURE — 25010000002 CEFTRIAXONE PER 250 MG: Performed by: INTERNAL MEDICINE

## 2018-04-17 RX ORDER — HYDROCHLOROTHIAZIDE 25 MG/1
25 TABLET ORAL DAILY
Status: DISCONTINUED | OUTPATIENT
Start: 2018-04-17 | End: 2018-04-20 | Stop reason: HOSPADM

## 2018-04-17 RX ORDER — DILTIAZEM HYDROCHLORIDE 120 MG/1
120 CAPSULE, COATED, EXTENDED RELEASE ORAL DAILY
Status: DISCONTINUED | OUTPATIENT
Start: 2018-04-17 | End: 2018-04-20 | Stop reason: HOSPADM

## 2018-04-17 RX ADMIN — CHOLECALCIFEROL CAP 125 MCG (5000 UNIT) 5000 UNITS: 125 CAP at 08:36

## 2018-04-17 RX ADMIN — CYANOCOBALAMIN TAB 500 MCG 1000 MCG: 500 TAB at 08:36

## 2018-04-17 RX ADMIN — METOPROLOL TARTRATE 50 MG: 50 TABLET ORAL at 20:31

## 2018-04-17 RX ADMIN — IPRATROPIUM BROMIDE AND ALBUTEROL SULFATE 3 ML: .5; 3 SOLUTION RESPIRATORY (INHALATION) at 23:48

## 2018-04-17 RX ADMIN — AZITHROMYCIN 500 MG: 500 INJECTION, POWDER, LYOPHILIZED, FOR SOLUTION INTRAVENOUS at 14:32

## 2018-04-17 RX ADMIN — MULTIPLE VITAMINS W/ MINERALS TAB 1 TABLET: TAB at 08:36

## 2018-04-17 RX ADMIN — FAMOTIDINE 20 MG: 20 TABLET ORAL at 08:35

## 2018-04-17 RX ADMIN — OXYCODONE HYDROCHLORIDE AND ACETAMINOPHEN 1000 MG: 500 TABLET ORAL at 08:36

## 2018-04-17 RX ADMIN — Medication 20 MG: at 00:50

## 2018-04-17 RX ADMIN — INSULIN ASPART 2 UNITS: 100 INJECTION, SOLUTION INTRAVENOUS; SUBCUTANEOUS at 20:31

## 2018-04-17 RX ADMIN — CEFTRIAXONE 1 G: 1 INJECTION, SOLUTION INTRAVENOUS at 08:33

## 2018-04-17 RX ADMIN — ATORVASTATIN CALCIUM 10 MG: 10 TABLET, FILM COATED ORAL at 20:31

## 2018-04-17 RX ADMIN — ASPIRIN 81 MG: 81 TABLET, COATED ORAL at 08:35

## 2018-04-17 RX ADMIN — INSULIN ASPART 2 UNITS: 100 INJECTION, SOLUTION INTRAVENOUS; SUBCUTANEOUS at 12:35

## 2018-04-17 RX ADMIN — INSULIN ASPART 4 UNITS: 100 INJECTION, SOLUTION INTRAVENOUS; SUBCUTANEOUS at 06:48

## 2018-04-17 RX ADMIN — IPRATROPIUM BROMIDE AND ALBUTEROL SULFATE 3 ML: .5; 3 SOLUTION RESPIRATORY (INHALATION) at 05:28

## 2018-04-17 RX ADMIN — Medication 2 TABLET: at 20:31

## 2018-04-17 RX ADMIN — SODIUM CHLORIDE 100 ML/HR: 9 INJECTION, SOLUTION INTRAVENOUS at 04:53

## 2018-04-17 RX ADMIN — HYDROCHLOROTHIAZIDE 25 MG: 25 TABLET ORAL at 08:35

## 2018-04-17 RX ADMIN — ACETAMINOPHEN 650 MG: 325 TABLET, FILM COATED ORAL at 23:12

## 2018-04-17 RX ADMIN — POLYSACCHARIDE-IRON COMPLEX 150 MG: 150 CAPSULE ORAL at 08:36

## 2018-04-17 RX ADMIN — LEVOTHYROXINE SODIUM 88 MCG: 0.09 TABLET ORAL at 06:48

## 2018-04-17 RX ADMIN — INSULIN DETEMIR 55 UNITS: 100 INJECTION, SOLUTION SUBCUTANEOUS at 20:32

## 2018-04-17 RX ADMIN — ENOXAPARIN SODIUM 30 MG: 30 INJECTION SUBCUTANEOUS at 17:54

## 2018-04-17 RX ADMIN — METOPROLOL TARTRATE 50 MG: 50 TABLET ORAL at 08:36

## 2018-04-17 RX ADMIN — NITROGLYCERIN 1 INCH: 20 OINTMENT TOPICAL at 04:50

## 2018-04-17 RX ADMIN — Medication 20 MG: at 12:50

## 2018-04-17 RX ADMIN — IPRATROPIUM BROMIDE AND ALBUTEROL SULFATE 3 ML: .5; 3 SOLUTION RESPIRATORY (INHALATION) at 13:13

## 2018-04-17 RX ADMIN — DILTIAZEM HYDROCHLORIDE 120 MG: 120 CAPSULE, COATED, EXTENDED RELEASE ORAL at 08:35

## 2018-04-17 NOTE — PROGRESS NOTES
Adult Nutrition  Assessment/PES    Patient Name:  Ninoska Gloria  YOB: 1931  MRN: [id number]  Admit Date:  4/16/2018    Assessment Date:  4/17/2018    Comments:    Rec #1: Consider adding renal modification to current diet order; Encouraging intake. Nutritional supplement, Nepro, ordered  BID to promote PO intake. Rec #2: Consider renal MVI with minerals daily.   Will follow pt. Consult RD PRN.           Adult Nutrition Assessment     Row Name 04/17/18 1028       Nutrition Prescription PO    Current PO Diet (P)  Regular    Common Modifiers (P)  Cardiac;Consistent Carbohydrate       PO Evaluation    Number of Days PO Intake Evaluated (P)  Insufficient Data    Row Name 04/17/18 1023       Calculation Measurements    Weight Used For Calculations (P)  49.8 kg (109 lb 13 oz)       Estimated/Assessed Needs    Additional Documentation (P)  Fluid Requirements (Group);Somerset-St. Jeor Equation (Group);Protein Requirements (Group);Calorie Requirements (Group)       Calorie Requirements    Estimated Calorie Requirement (kcal/day) (P)  --   AF 1.2    Estimated Calorie Need Method (P)  Somerset-St Jeor    Estimated Calorie Requirement Comment (P)  ~5834-9976       KCAL/KG    14 Kcal/Kg (kcal) (P)  697.33    15 Kcal/Kg (kcal) (P)  747.14    18 Kcal/Kg (kcal) (P)  896.56    20 Kcal/Kg (kcal) (P)  996.18    25 Kcal/Kg (kcal) (P)  1245.23    30 Kcal/Kg (kcal) (P)  1494.27    35 Kcal/Kg (kcal) (P)  1743.32    40 Kcal/Kg (kcal) (P)  1992.36    45 Kcal/Kg (kcal) (P)  2241.41    50 Kcal/Kg (kcal) (P)  2490.45       Somerset-St. Jeor Equation    RMR (Somerset-St. Jeor Equation) (P)  854.59       Protein Requirements    Est Protein Requirement Amount (gms/kg) (P)  0.6 gm protein   29.89-37.36    Estimated Protein Requirements (gms/day) (P)  29.89       Fluid Requirements    Estimated Fluid Requirements (mL/day) (P)  --   output + 1000    Salmon-Segar Method (over 20 kg) (P)  2496.18    Row Name 04/17/18 1028        "Anthropometrics    Height Method (P)  stated    Height (P)  152.4 cm (60\")    Current Weight Method (P)  measured    Weight (P)  62.2 kg (137 lb 2 oz)       Ideal Body Weight (IBW)    Ideal Body Weight (IBW) (kg) (P)  45.86    % Ideal Body Weight (P)  135.63       Body Mass Index (BMI)    BMI (kg/m2) (P)  26.84    BMI Assessment (P)  BMI 25-29.9: overweight       IBW Adjustment, Para/Tetraplegia    5% Adjustment, Para (IBW) (P)  43.57    10% Adjustment, Para (IBW) (P)  41.27    10% Adjustment, Tetra (IBW) (P)  41.27    15% Adjustment, Tetra (IBW) (P)  38.98       Labs/Procedures/Meds    Lab Results Reviewed (P)  reviewed, pertinent    Lab Results Comments (P)  High: BUN/Cr ratio, GLUC, K+, BUN, Chloride  Low: Alb, GFR, TP    Row Name 04/17/18 1020       Reason for Assessment    Reason For Assessment (P)  diagnosis/disease state   DM, hyperlipidemia, HTN, MI, sepsis, PNA, hyperkalemia, CKD stage III, Afib, CAD, [place]    Diagnosis (P)  cardiac disease;renal disease;endocrine conditions;other (see comments)    Identified At Risk by Screening Criteria (P)  BMI          Problem/Interventions:        Problem 1     Row Name 04/17/18 1029       Nutrition Diagnoses Problem 1    Problem 1 (P)  Altered Nutrition Related to Labs    Etiology (related to) (P)  Medical Diagnosis    Cardiac (P)  HTN;MI;CAD   Afib, hyperlipidemia    Endocrine (P)  DM2    Renal (P)  CKD;Other (comment)   Stage III    Signs/Symptoms (evidenced by) (P)  Biochemical    Labs Reviewed (P)  Done    Specific Labs Noted (P)  Albumin;Other (comment);K+;BUN;Glucose;GFR;Chloride   Total protein, BUN/Cr ratio            Problem 2     Row Name 04/17/18 1030       Nutrition Diagnoses Problem 2    Problem 2 (P)  Overweight/Obesity    Etiology (related to) (P)  Factors Affecting Nutrition    Food Habit/Preferences (P)  Large Meals    Signs/Symptoms (evidenced by) (P)  BMI;% IBW    BMI (P)  25 - 29.9    Percent (%) IBW (P)  135.6 %                  Intervention Goal  "    Row Name 04/17/18 1031       Intervention Goal    General (P)  Meet nutritional needs for age/condition;Disease management/therapy;Improved nutrition related lab(s)    PO (P)  Meet estimated needs;PO intake (%);Establish PO    PO Intake % (P)  50 %    Weight (P)  No significant weight loss            Nutrition Intervention     Row Name 04/17/18 1031       Nutrition Intervention    RD/Tech Action (P)  Follow Tx progress;Recommend/ordered;Encourage intake    Recommended/Ordered (P)  Supplement            Nutrition Prescription     Row Name 04/17/18 1032       Nutrition Prescription PO    PO Prescription (P)  Begin/change supplement   consider adding renal to current diet order    Fluid Consistency (P)  Thin    Supplement (P)  Nepro    Supplement Frequency (P)  2 times a day    Common Modifiers (P)  Cardiac;Consistent Carbohydrate;Renal    New PO Prescription Ordered? (P)  No, recommended       Other Orders    Supplement (P)  Vitamin mineral supplement    Supplement Ordered? (P)  No, recommended            Education/Evaluation     Row Name 04/17/18 1032       Education    Education (P)  Will Instruct as appropriate       Monitor/Evaluation    Monitor (P)  Per protocol;I&O;PO intake;Supplement intake;Pertinent labs;Weight        Electronically signed by:  Haven Spaulding  04/17/18 10:33 AM

## 2018-04-17 NOTE — PLAN OF CARE
"Problem: Fall Risk (Adult)  Goal: Identify Related Risk Factors and Signs and Symptoms  Outcome: Ongoing (interventions implemented as appropriate)   04/17/18 0350   Fall Risk (Adult)   Related Risk Factors (Fall Risk) age-related changes;fatigue/slow reaction;fear of falling;gait/mobility problems;history of falls;polypharmacy;environment unfamiliar   Signs and Symptoms (Fall Risk) presence of risk factors     Goal: Absence of Fall  Outcome: Ongoing (interventions implemented as appropriate)   04/17/18 0350   Fall Risk (Adult)   Absence of Fall making progress toward outcome       Problem: Patient Care Overview  Goal: Plan of Care Review  Outcome: Ongoing (interventions implemented as appropriate)   04/17/18 0350   Coping/Psychosocial   Plan of Care Reviewed With patient;daughter   Plan of Care Review   Progress no change   OTHER   Outcome Summary maintaining this shift     Goal: Individualization and Mutuality   04/17/18 0350   Individualization   Patient Specific Preferences Sleeps on left side, daughter at bedside   Patient Specific Goals (Include Timeframe) \"to get better and go home\"   Patient Specific Interventions monitor BP and respiratory status     Goal: Discharge Needs Assessment  Outcome: Ongoing (interventions implemented as appropriate)   04/16/18 1383 04/17/18 0350   Discharge Needs Assessment   Readmission Within the Last 30 Days --  no previous admission in last 30 days   Concerns to be Addressed --  no discharge needs identified   Patient/Family Anticipates Transition to --  home   Patient/Family Anticipated Services at Transition --  none   Transportation Anticipated --  family or friend will provide   Disability   Equipment Currently Used at Home nebulizer;cane, straight --        Problem: Skin Injury Risk (Adult)  Goal: Identify Related Risk Factors and Signs and Symptoms  Outcome: Ongoing (interventions implemented as appropriate)   04/17/18 0350   Skin Injury Risk (Adult)   Related Risk " Factors (Skin Injury Risk) advanced age;infection;medication;mobility impaired     Goal: Skin Health and Integrity  Outcome: Ongoing (interventions implemented as appropriate)   04/17/18 0350   Skin Injury Risk (Adult)   Skin Health and Integrity making progress toward outcome

## 2018-04-17 NOTE — PAYOR COMM NOTE
"TO:BC   FROM:CHRISTINE FUENTES, RN PHONE [phone]  IN CLINICALS REF# [id number]    Ninoska Gloria (87 y.o. Female)     Date of Birth Social Security Number Address Home Phone MRN    02/08/1931  [address] [facility] [address] [facility] [address] [phone] [id number]    Confucianist Marital Status          None        Admission Date Admission Type Admitting Provider Attending Provider Department, Room/Bed    4/16/18 Emergency Elvira Garcia MD Majumder, Mosumi, MD [facility] SURG  3, 329/1    Discharge Date Discharge Disposition Discharge Destination                       Attending Provider:  Elvira Garcia MD    Allergies:  No Known Allergies    Isolation:  None   Infection:  None   Code Status:  FULL    Ht:  152.4 cm (60\")   Wt:  62.2 kg (137 lb 2 oz)    Admission Cmt:  None   Principal Problem:  None                Active Insurance as of 4/16/2018     Primary Coverage     Payor Plan Insurance Group Employer/Plan Group    ANTHEM MEDICARE REPLACEMENT ANTHEM MEDICARE ADVANTAGE [id number]     Payor Plan Address Payor Plan Phone Number Effective From Effective To    [address] [phone] 1/1/2018     [place] [address]       Subscriber Name Subscriber Birth Date Member ID       NINOSKA GLORIA 2/8/1931 [id number]                 Emergency Contacts      (Rel.) Home Phone Work Phone Mobile Phone    Ira Morales (Daughter) [phone] -- --               Emergency Department Notes      AKBAR Matos at 4/16/2018  4:03 PM     Attestation signed by Benny Teran MD at 4/17/2018  7:07 AM          For this patient encounter, I reviewed the NP or PA documentation, treatment plan, and medical decision making. Benny Teran MD 4/17/2018 7:07 AM                  Subjective   History of Present Illness  This is an 87-year-old female who has a history of diabetes mellitus, hyperlipidemia, hypertension, renal failure, and coronary " artery disease status post myocardial infarction.  She also has a history of atrial fibrillation.  She was seen in our emergency department approximately a week ago and diagnosed with the urinary tract infection.  At that time she was having fatigue and malaise.  She was taking antibiotics for that and then charted developing shortness of breath over the weekend.  This morning she woke up and was extremely short of breath and her chest felt tight.  She has also had worsening malaise and fatigue.  Her appetite is decreased.  So her family brought her to the emergency department.  She denies chest pain, but states that she is very short of breath.  Review of Systems   All other systems reviewed and are negative.      Past Medical History:   Diagnosis Date   • Diabetes mellitus    • Hyperlipidemia    • Hypertension    • Myocardial infarction    • Renal failure        No Known Allergies    Past Surgical History:   Procedure Laterality Date   • CATARACT EXTRACTION     •  SECTION         History reviewed. No pertinent family history.    Social History     Social History   • Marital status:      Social History Main Topics   • Smoking status: Never Smoker   • Alcohol use No   • Drug use: No   • Sexual activity: Defer     Other Topics Concern   • Not on file           Objective   Physical Exam   Constitutional: She is oriented to person, place, and time. She appears well-nourished.   Elderly female in no acute distress   HENT:   Head: Normocephalic and atraumatic.   Mouth/Throat: Oropharynx is clear and moist.   Eyes: Conjunctivae are normal. Pupils are equal, round, and reactive to light.   Neck: Normal range of motion. Neck supple.   Cardiovascular: Normal rate, regular rhythm and intact distal pulses.    Murmur heard.  2/6    Pulmonary/Chest: No respiratory distress. She has wheezes. She has no rales. She exhibits no tenderness.   Working slightly hard to bring.   Abdominal: Soft. Bowel sounds are normal.  She exhibits no distension. There is no tenderness.   Musculoskeletal: Normal range of motion. She exhibits no edema.   Neurological: She is alert and oriented to person, place, and time.   Skin: Skin is warm and dry. Capillary refill takes less than 2 seconds. No rash noted.   Psychiatric: She has a normal mood and affect. Her behavior is normal. Judgment and thought content normal.   Nursing note and vitals reviewed.      Procedures        ED Course  ED Course   Comment By Time   Potassium is 6.4, patient was given calcium 1 g, 1 amp of D50, and insulin 10 mg IV.  She was given a DuoNeb which improved her shortness of breath.  X-ray demonstrates patchy infiltrates.  BNP is elevated 7000.  The abdomen is 1.6, previously it's been 2.9.  Troponin is mildly elevated at 0.108.  Urine has no significant infection.  Lactic acid is 2.4.  ABG demonstrates a PCO2 of 34.9, and a bicarbonate of 20.9.  Given a liter of fluids.  Cultures were drawn and started on Rocephin 1 g IV and Zithromax 500 mg IV Marcie Conway, APRN 04/16 1623   Blood pressure continued to be elevated at 200/80, given labetalol 10 mg IV.  I spoke with Dr. Garcia and the patient will be admitted to the ICU for further evaluation.  I spoke with the family in the indicated that if her heart stopped they suspect she would not want it to be restarted but he also indicated if there was something that could he helped they would want that done. Marcie Conway, APRN 04/16 1624      She was transferred to the ICU in stable condition.            MDM    Final diagnoses:   Pneumonia of lower lobe due to infectious organism, unspecified laterality   Hyperkalemia   Elevated troponin   Elevated brain natriuretic peptide (BNP) level            Marcie Conway, AKBAR  04/16/18 1950      Electronically signed by Benny Teran MD at 4/17/2018  7:07 AM             ICU Vital Signs     Row Name 04/17/18 1022 04/17/18 1012 04/17/18 0933  "04/17/18 0901 04/17/18 0835       Height and Weight    Height (P)  152.4 cm (60\")  --  --  --  --    Weight (P)  62.2 kg (137 lb 2 oz)  --  --  --  --    Ideal Body Weight (IBW) (kg) (P)  45.86  --  --  --  --    BSA (Calculated - sq m) (P)  1.59 sq meters  --  --  --  --    BMI (Calculated) (P)  26.8  --  --  --  --    Weight in (lb) to have BMI = 25 (P)  127.7  --  --  --  --       Vitals    Temp  -- 98.8 °F (37.1 °C)  --  --  --    Temp src  -- Oral  --  --  --    Pulse  --  -- 89 98 96    Heart Rate Source  -- Monitor  --  --  --    Resp  -- 19  --  --  --    Resp Rate Source  -- Visual  --  --  --    BP  -- (!)  193/90 171/83 (!)  183/82 (!)  196/105    Noninvasive MAP (mmHg)  --  -- 138 126  --    BP Location  -- Left arm  --  --  --    BP Method  -- Automatic  --  --  --    Patient Position  -- Lying  --  --  --       Oxygen Therapy    SpO2  --  -- 96 % 93 %  --    Pulse Oximetry Type  -- Continuous  --  --  --    Device (Oxygen Therapy)  -- nasal cannula  --  -- nasal cannula    Flow (L/min)  -- 2  --  -- 2    Row Name 04/17/18 0802 04/17/18 0702 04/17/18 0603 04/17/18 0600 04/17/18 0531       Vitals    Temp 98.5 °F (36.9 °C)  --  --  --  --    Temp src Oral  --  --  --  --    Pulse 92 92 98  -- 92    Heart Rate Source Monitor  --  --  --  --    Resp 21  -- 20  --  --    Resp Rate Source Monitor  --  --  --  --    BP (!)  184/82 169/68 171/55  -- (!)  192/88    Noninvasive MAP (mmHg) 128 118 65  -- 133    BP Method Automatic  --  --  --  --    Patient Position Lying  --  --  --  --       Oxygen Therapy    SpO2 97 % 96 % 98 %  -- 98 %    Pulse Oximetry Type Continuous  --  --  --  --    Device (Oxygen Therapy) nasal cannula  --  -- nasal cannula  --    Flow (L/min) 2  --  -- 2  --    Row Name 04/17/18 0528 04/17/18 0501 04/17/18 0450 04/17/18 0433 04/17/18 0403       Vitals    Pulse 90 88 86 85 85    Heart Rate Source Monitor  --  --  --  --    Resp 20  --  --  --  --    Resp Rate Source Monitor  --  --  --  " --    BP  -- 177/80 161/72 161/72 167/70    Noninvasive MAP (mmHg)  -- 127  -- 107 114       Oxygen Therapy    SpO2 98 % 93 %  -- 96 % 95 %    Device (Oxygen Therapy) nasal cannula  --  --  --  --    Flow (L/min) 2  --  --  --  --    Row Name 04/17/18 0400 04/17/18 0332 04/17/18 0301 04/17/18 0233 04/17/18 0202       Vitals    Pulse  -- 87 87 90 85    Resp  --  -- 18  -- 22    BP  -- 165/75 166/79 180/82 155/63    Noninvasive MAP (mmHg)  -- 106 112 113 98       Oxygen Therapy    SpO2  -- 95 % 94 % 93 % 96 %    Device (Oxygen Therapy) nasal cannula  --  --  --  --    Flow (L/min) 2  --  --  --  --    Row Name 04/17/18 0200 04/17/18 0131 04/17/18 0103 04/17/18 0050 04/17/18 0033       Vitals    Pulse  -- 85 83 77 77    Resp  --  -- 24  --  --    BP  -- 157/67 143/65 178/76 178/76    Noninvasive MAP (mmHg)  -- 108 92  -- 119       Oxygen Therapy    SpO2  -- 97 % 96 %  -- 96 %    Device (Oxygen Therapy) nasal cannula  --  --  --  --    Flow (L/min) 2  --  --  --  --    Row Name 04/17/18 0002 04/17/18 0000 04/16/18 2332 04/16/18 2303 04/16/18 2232       Vitals    Temp 98.1 °F (36.7 °C)  --  --  --  --    Temp src Oral  --  --  --  --    Pulse 86  -- 80 87 85    Heart Rate Source Monitor  --  --  --  --    Resp 22  --  -- 22  --    Resp Rate Source Visual  --  --  --  --    BP (!)  202/82  -- (!)  199/88 (!)  201/83 (!)  195/81    Noninvasive MAP (mmHg) 115  -- 108 149 102    BP Location Left arm  --  --  --  --    BP Method Automatic  --  --  --  --    Patient Position Lying  --  --  --  --       Oxygen Therapy    SpO2 97 %  -- 96 % 95 % 96 %    Pulse Oximetry Type Continuous  --  --  --  --    Device (Oxygen Therapy) nasal cannula nasal cannula  --  --  --    Flow (L/min) 2 2  --  --  --    Row Name 04/16/18 2202 04/16/18 2200 04/16/18 2132 04/16/18 2103 04/16/18 2032       Vitals    Pulse 86  -- 89 88 86    Resp 20  --  -- 20  --    BP (!)  203/82  -- (!)  192/132 (!)  216/87 (!)  203/75    Noninvasive MAP (mmHg) 129   "-- 143 127 133       Oxygen Therapy    SpO2 95 %  -- 96 % 97 % 95 %    Device (Oxygen Therapy)  -- nasal cannula  --  --  --    Flow (L/min)  -- 2  --  --  --    Row Name 04/16/18 2002 04/16/18 1933 04/16/18 1918 04/16/18 1902 04/16/18 1852       Vitals    Temp 97.8 °F (36.6 °C)  --  --  --  --    Temp src Oral  --  --  --  --    Pulse 88 85 85 87 86    Heart Rate Source Monitor  --  --  --  --    Resp 24  --  --  --  --    Resp Rate Source Visual  --  --  --  --    BP (!)  194/85 (!)  189/74 (!)  192/70 (!)  187/75 (!)  202/75    Noninvasive MAP (mmHg) 99 108 101 120 128    BP Location Left arm  --  --  --  --    BP Method Automatic  --  --  --  --    Patient Position Lying  --  --  --  --       Oxygen Therapy    SpO2 95 % 97 % 97 % 97 % 97 %    Pulse Oximetry Type Continuous  --  --  --  --    Device (Oxygen Therapy) nasal cannula  --  --  --  --    Flow (L/min) 2  --  --  --  --    Row Name 04/16/18 1832 04/16/18 1817 04/16/18 1802 04/16/18 1745 04/16/18 1740       Vitals    Pulse 84 79 75 74 75    Heart Rate Source  --  --  -- Monitor Monitor    Resp  --  --  --  -- 12    Resp Rate Source  --  --  --  -- Monitor    BP (!)  208/95 (!)  188/85 (!)  196/77 (!)  188/81  --    Noninvasive MAP (mmHg) 137 124 131 127  --    BP Location  --  --  -- Left arm  --    BP Method  --  --  -- Automatic  --    Patient Position  --  --  -- Lying  --       Oxygen Therapy    SpO2 97 % 98 % 97 % 98 % 98 %    Pulse Oximetry Type  --  --  --  -- Continuous    Device (Oxygen Therapy)  --  --  --  -- nasal cannula    Flow (L/min)  --  --  --  -- 2    Row Name 04/16/18 1730 04/16/18 1725 04/16/18 16:50:34 04/16/18 1602 04/16/18 1522       Height and Weight    Height  -- 152.4 cm (60\")  --  --  --    Height Method  -- Stated  --  --  --    Weight  -- 62.2 kg (137 lb 2 oz)  --  --  --    Weight Method  -- Bed scale  --  --  --    Ideal Body Weight (IBW) (kg)  -- 45.86  --  --  --    BSA (Calculated - sq m)  -- 1.59 sq meters  --  --  -- " "   BMI (Calculated)  -- 26.8  --  --  --    Weight in (lb) to have BMI = 25  -- 127.7  --  --  --       Vitals    Temp  -- 97.7 °F (36.5 °C) 97.9 °F (36.6 °C)  --  --    Temp src  -- Oral Oral  --  --    Pulse  --  --  -- 76 80    Heart Rate Source  --  --  --  -- Monitor    Resp  --  -- 17  -- 19    Resp Rate Source  --  -- Visual  -- Visual    BP  --  --  -- 174/74 (!)  186/90    Noninvasive MAP (mmHg)  --  --  -- 136  --    BP Location  --  --  --  -- Left arm    BP Method  --  --  --  -- Automatic    Patient Position  --  --  --  -- Sitting       Oxygen Therapy    SpO2  --  --  -- 96 % 95 %    Device (Oxygen Therapy) nasal cannula  --  --  -- nasal cannula    Flow (L/min) 2  --  --  -- 2    Row Name 04/16/18 15:00:16 04/16/18 1500 04/16/18 1432 04/16/18 14:22:52 04/16/18 1422       Vitals    Pulse 80 80 72  -- 75    Heart Rate Source Monitor  --  --  --  --    Resp 20  --  --  --  --    Resp Rate Source Stethoscope  --  --  --  --    /69 175/69 (!)  200/72  -- (!)  205/76    Noninvasive MAP (mmHg)  --  -- 106  -- 120    BP Location Left arm  --  --  --  --    BP Method Automatic  --  --  --  --    Patient Position Lying  --  --  --  --       Oxygen Therapy    SpO2  --  -- 96 %  --  --    Device (Oxygen Therapy)  --  --  -- nasal cannula  --    Flow (L/min)  --  --  -- 2  --    Row Name 04/16/18 1407 04/16/18 13:37:49 04/16/18 1332 04/16/18 1325 04/16/18 1309       Vitals    Pulse 74  -- 72  -- 73    Heart Rate Source  --  --  -- Monitor Monitor    Resp  -- 19 -- 18 18    Resp Rate Source  -- Visual  -- Visual Visual    BP  --  -- (!)  190/64  --  --    Noninvasive MAP (mmHg)  --  -- 126  --  --    Patient Position  --  --  --  -- Lying       Oxygen Therapy    SpO2 91 %  -- 91 %  -- 91 %    Pulse Oximetry Type  --  --  -- Continuous Continuous    Device (Oxygen Therapy)  --  --  -- room air room air    Row Name 04/16/18 1236                   Height and Weight    Height 149.9 cm (59\")        Weight 62.4 kg " (137 lb 9.6 oz)        Weight Method Standing scale        Ideal Body Weight (IBW) (kg) 43.57        BSA (Calculated - sq m) 1.57 sq meters        BMI (Calculated) 27.8        Weight in (lb) to have BMI = 25 123.5           Vitals    Temp 97.6 °F (36.4 °C)        Pulse 71        Resp 18        BP (!)  200/80           Oxygen Therapy    SpO2 95 %            Hospital Medications (active)       Dose Frequency Start End    acetaminophen (TYLENOL) tablet 650 mg 650 mg Every 4 Hours PRN 4/16/2018     Sig - Route: Take 2 tablets by mouth Every 4 (Four) Hours As Needed for Mild Pain . - Oral    aspirin EC tablet 81 mg 81 mg Daily 4/17/2018     Sig - Route: Take 1 tablet by mouth Daily. - Oral    atorvastatin (LIPITOR) tablet 10 mg 10 mg Nightly 4/16/2018     Sig - Route: Take 1 tablet by mouth Every Night. - Oral    AZITHROMYCIN 500 MG/250 ML 0.9% NS IVPB (vial-mate) 500 mg Once 4/16/2018 4/16/2018    Sig - Route: Infuse 500 mg into a venous catheter 1 (One) Time. - Intravenous    Cosign for Ordering: Accepted by Benny Teran MD on 4/16/2018  2:14 PM    AZITHROMYCIN 500 MG/250 ML 0.9% NS IVPB (vial-mate) 500 mg Every 24 Hours 4/17/2018 4/21/2018    Sig - Route: Infuse 500 mg into a venous catheter Daily. - Intravenous    calcium gluconate injection 1 g 1 g Once 4/16/2018 4/16/2018    Sig - Route: Infuse 10 mL into a venous catheter 1 (One) Time. - Intravenous    Cosign for Ordering: Accepted by Benny Teran MD on 4/16/2018  2:14 PM    cefTRIAXone (ROCEPHIN) IVPB 1 g/50ml dextrose (premix) 1 g Once 4/16/2018 4/16/2018    Sig - Route: Infuse 50 mL into a venous catheter 1 (One) Time. - Intravenous    Cosign for Ordering: Accepted by Benny Teran MD on 4/16/2018  2:14 PM    cefTRIAXone (ROCEPHIN) IVPB 1 g/50ml dextrose (premix) 1 g Daily 4/17/2018 4/21/2018    Sig - Route: Infuse 50 mL into a venous catheter Daily. - Intravenous    dextrose (D50W) solution 25 g 25 g Every 15 Minutes PRN 4/16/2018      Sig - Route: Infuse 50 mL into a venous catheter Every 15 (Fifteen) Minutes As Needed for Low Blood Sugar (Blood Sugar Less Than 70, Patient Has IV Access - Unresponsive, NPO or Unable To Safely Swallow). - Intravenous    dextrose (D50W) solution 50 mL 50 mL Every 1 Hour PRN 4/16/2018     Sig - Route: Infuse 50 mL into a venous catheter Every 1 (One) Hour As Needed for Low Blood Sugar. - Intravenous    Cosign for Ordering: Accepted by Benny Teran MD on 4/16/2018  2:14 PM    dextrose (GLUTOSE) oral gel 1 tube 1 tube Every 15 Minutes PRN 4/16/2018     Sig - Route: Take 1 tube by mouth Every 15 (Fifteen) Minutes As Needed for Low Blood Sugar (BS<70, Patient Alert, Is not NPO, Can safely swallow.). - Oral    diltiaZEM CD (CARDIZEM CD) 24 hr capsule 120 mg 120 mg Daily 4/17/2018     Sig - Route: Take 1 capsule by mouth Daily. - Oral    enoxaparin (LOVENOX) syringe 30 mg 30 mg Every 24 Hours 4/16/2018     Sig - Route: Inject 0.3 mL under the skin Daily. - Subcutaneous    famotidine (PEPCID) tablet 20 mg 20 mg Daily 4/16/2018     Sig - Route: Take 1 tablet by mouth Daily. - Oral    glucagon (human recombinant) (GLUCAGEN DIAGNOSTIC) injection 1 mg 1 mg As Needed 4/16/2018     Sig - Route: Inject 1 mg under the skin As Needed (Blood Glucose Less Than 70 - Patient Without IV Access - Unresponsive, NPO or Unable To Safely Swallow). - Subcutaneous    hydrALAZINE (APRESOLINE) injection 20 mg 20 mg Every 6 Hours PRN 4/16/2018     Sig - Route: Infuse 1 mL into a venous catheter Every 6 (Six) Hours As Needed for High Blood Pressure. - Intravenous    hydrochlorothiazide (HYDRODIURIL) tablet 25 mg 25 mg Daily 4/17/2018     Sig - Route: Take 1 tablet by mouth Daily. - Oral    insulin aspart (novoLOG) injection 0-9 Units 0-9 Units 4 Times Daily Before Meals & Nightly 4/16/2018     Sig - Route: Inject 0-9 Units under the skin 4 (Four) Times a Day Before Meals & at Bedtime. - Subcutaneous    insulin detemir (LEVEMIR) injection  55 Units 55 Units Nightly 4/16/2018     Sig - Route: Inject 55 Units under the skin Every Night. - Subcutaneous    insulin regular (humuLIN R,novoLIN R) injection 10 Units 10 Units Once 4/16/2018 4/16/2018    Sig - Route: Infuse 10 Units into a venous catheter 1 (One) Time. - Intravenous    Cosign for Ordering: Accepted by Benny Teran MD on 4/16/2018  2:14 PM    ipratropium-albuterol (DUO-NEB) nebulizer solution 3 mL 3 mL Once 4/16/2018 4/16/2018    Sig - Route: Take 3 mL by nebulization 1 (One) Time. - Nebulization    Cosign for Ordering: Accepted by Benny Teran MD on 4/16/2018  2:14 PM    ipratropium-albuterol (DUO-NEB) nebulizer solution 3 mL 3 mL Every 4 Hours PRN 4/16/2018     Sig - Route: Take 3 mL by nebulization Every 4 (Four) Hours As Needed for Wheezing or Shortness of Air. - Nebulization    iron polysaccharides (NIFEREX) capsule 150 mg 150 mg Daily 4/17/2018     Sig - Route: Take 1 capsule by mouth Daily. - Oral    labetalol (NORMODYNE,TRANDATE) injection 10 mg 10 mg Once 4/16/2018 4/16/2018    Sig - Route: Infuse 2 mL into a venous catheter 1 (One) Time. - Intravenous    Cosign for Ordering: Accepted by Benny Teran MD on 4/17/2018  7:13 AM    levothyroxine (SYNTHROID, LEVOTHROID) tablet 88 mcg 88 mcg Every Early Morning 4/17/2018     Sig - Route: Take 1 tablet by mouth Every Morning. - Oral    metoprolol tartrate (LOPRESSOR) tablet 50 mg 50 mg Every 12 Hours Scheduled 4/16/2018     Sig - Route: Take 1 tablet by mouth Every 12 (Twelve) Hours. - Oral    multivitamin with minerals 1 tablet 1 tablet Daily 4/17/2018     Sig - Route: Take 1 tablet by mouth Daily. - Oral    nitroglycerin (NITROSTAT) 2 % ointment  - ADS Override Pull   4/16/2018 4/16/2018    Notes to Pharmacy: Created by cabinet override    nitroglycerin (NITROSTAT) ointment 1 inch 1 inch Every 6 Hours PRN 4/17/2018     Sig - Route: Apply 1 inch topically Every 6 (Six) Hours As Needed for Chest Pain. - Topical     "ondansetron (ZOFRAN) injection 4 mg 4 mg Every 6 Hours PRN 4/16/2018     Sig - Route: Infuse 2 mL into a venous catheter Every 6 (Six) Hours As Needed for Nausea or Vomiting. - Intravenous    Linked Group 1:  \"Or\" Linked Group Details        ondansetron (ZOFRAN) tablet 4 mg 4 mg Every 6 Hours PRN 4/16/2018     Sig - Route: Take 1 tablet by mouth Every 6 (Six) Hours As Needed for Nausea or Vomiting. - Oral    Linked Group 1:  \"Or\" Linked Group Details        ondansetron ODT (ZOFRAN-ODT) disintegrating tablet 4 mg 4 mg Every 6 Hours PRN 4/16/2018     Sig - Route: Take 1 tablet by mouth Every 6 (Six) Hours As Needed for Nausea or Vomiting. - Oral    Linked Group 1:  \"Or\" Linked Group Details        sennosides-docusate sodium (SENOKOT-S) 8.6-50 MG tablet 2 tablet 2 tablet Nightly 4/16/2018     Sig - Route: Take 2 tablets by mouth Every Night. - Oral    sodium chloride 0.9 % bolus 1,000 mL 1,000 mL Once 4/16/2018 4/16/2018    Sig - Route: Infuse 1,000 mL into a venous catheter 1 (One) Time. - Intravenous    Cosign for Ordering: Accepted by Benny Teran MD on 4/16/2018  2:14 PM    sodium chloride 0.9 % flush 1-10 mL 1-10 mL As Needed 4/16/2018     Sig - Route: Infuse 1-10 mL into a venous catheter As Needed for Line Care. - Intravenous    sodium chloride 0.9 % flush 10 mL 10 mL As Needed 4/16/2018     Sig - Route: Infuse 10 mL into a venous catheter As Needed for Line Care. - Intravenous    Cosign for Ordering: Accepted by Benny Teran MD on 4/16/2018  2:14 PM    sodium polystyrene (KAYEXALATE) 15 GM/60ML suspension 30 g 30 g Once 4/16/2018 4/16/2018    Sig - Route: Take 120 mL by mouth 1 (One) Time. - Oral    vitamin B-12 (CYANOCOBALAMIN) tablet 1,000 mcg 1,000 mcg 4 Times Weekly 4/17/2018     Sig - Route: Take 2 tablets by mouth Once per day on Sun Tue Thu Sat. - Oral    vitamin C (ASCORBIC ACID) tablet 1,000 mg 1,000 mg Daily 4/17/2018     Sig - Route: Take 2 tablets by mouth Daily. - Oral    vitamin " D3 capsule 5,000 Units 5,000 Units Daily 4/17/2018     Sig - Route: Take 1 capsule by mouth Daily. - Oral    metoprolol tartrate (LOPRESSOR) tablet 25 mg (Discontinued) 25 mg Every 12 Hours Scheduled 4/16/2018 4/16/2018    Sig - Route: Take 1 tablet by mouth Every 12 (Twelve) Hours. - Oral    nitroglycerin (NITROSTAT) ointment 1 inch (Discontinued) 1 inch Every 6 Hours Scheduled 4/17/2018 4/16/2018    Sig - Route: Apply 1 inch topically Every 6 (Six) Hours. - Topical    Reason for Discontinue: Reorder    nitroglycerin (NITROSTAT) ointment 1 inch (Discontinued) 1 inch Every 6 Hours Scheduled 4/16/2018 4/16/2018    Sig - Route: Apply 1 inch topically Every 6 (Six) Hours. - Topical    nitroglycerin (NITROSTAT) ointment 1 inch (Discontinued) 1 inch Every 6 Hours Scheduled 4/16/2018 4/17/2018    Sig - Route: Apply 1 inch topically Every 6 (Six) Hours. - Topical    sodium chloride 0.9 % infusion (Discontinued) 100 mL/hr Continuous 4/16/2018 4/17/2018    Sig - Route: Infuse 100 mL/hr into a venous catheter Continuous. - Intravenous            Lab Results (last 24 hours)     Procedure Component Value Units Date/Time    POC Glucose Once [[id number]]  (Abnormal) Collected:  04/17/18 1132    Specimen:  Blood Updated:  04/17/18 1151     Glucose 150 (H) mg/dL      Comment: Serial Number: [id number]:  [id number]       POC Glucose Once [[id number]]  (Abnormal) Collected:  04/17/18 0621    Specimen:  Blood Updated:  04/17/18 0640     Glucose 210 (H) mg/dL      Comment: Serial Number: [id number]:  [id number]       POC Glucose Once [[id number]]  (Abnormal) Collected:  04/16/18 1658    Specimen:  Blood Updated:  04/17/18 0541     Glucose 350 (H) mg/dL      Comment: Serial Number: [id number]:  [id number]       Comprehensive Metabolic Panel [[id number]]  (Abnormal) Collected:  04/17/18 0450    Specimen:  Blood Updated:  04/17/18 0521     Glucose 249 (H) mg/dL      Comment: Glucose >180, Hemoglobin A1C recommended.        BUN 46  (H) mg/dL      Creatinine 1.20 mg/dL      Sodium 143 mmol/L      Potassium 5.2 (H) mmol/L      Chloride 112 (H) mmol/L      CO2 23.0 (L) mmol/L      Calcium 9.6 mg/dL      Total Protein 6.2 (L) g/dL      Albumin 3.00 (L) g/dL      ALT (SGPT) 44 U/L      AST (SGOT) 38 U/L      Alkaline Phosphatase 77 U/L      Total Bilirubin 0.3 mg/dL      eGFR Non African Amer 42 (L) mL/min/1.73      Globulin 3.2 gm/dL      A/G Ratio 0.9 (L) g/dL      BUN/Creatinine Ratio 38.3 (H)     Anion Gap 13.2 mmol/L     Narrative:       The MDRD GFR formula is only valid for adults with stable renal function between ages 18 and 70.    CBC & Differential [[id number]] Collected:  04/17/18 0450    Specimen:  Blood Updated:  04/17/18 0517    Narrative:       The following orders were created for panel order CBC & Differential.  Procedure                               Abnormality         Status                     ---------                               -----------         ------                     CBC Auto Differential[[id number]]        Abnormal            Final result                 Please view results for these tests on the individual orders.    CBC Auto Differential [[id number]]  (Abnormal) Collected:  04/17/18 0450    Specimen:  Blood Updated:  04/17/18 0517     WBC 9.57 10*3/mm3      RBC 3.40 (L) 10*6/mm3      Hemoglobin 10.0 (L) g/dL      Hematocrit 31.3 (L) %      MCV 92.1 fL      MCH 29.4 pg      MCHC 31.9 g/dL      RDW 13.8 %      RDW-SD 46.7 fl      MPV 10.1 fL      Platelets 245 10*3/mm3      Neutrophil % 74.6 %      Lymphocyte % 11.7 %      Monocyte % 12.5 (H) %      Eosinophil % 0.1 %      Basophil % 0.2 %      Immature Grans % 0.9 (H) %      Neutrophils, Absolute 7.13 (H) 10*3/mm3      Lymphocytes, Absolute 1.12 10*3/mm3      Monocytes, Absolute 1.20 (H) 10*3/mm3      Eosinophils, Absolute 0.01 10*3/mm3      Basophils, Absolute 0.02 10*3/mm3      Immature Grans, Absolute 0.09 (H) 10*3/mm3      nRBC 0.0 /100 WBC     Blood Culture  With ADRYAN - Blood, [[id number]]  (Normal) Collected:  04/16/18 1333    Specimen:  Blood from Blood, Venous Line Updated:  04/17/18 0230     Blood Culture No growth at less than 24 hours    Blood Culture With ADRYAN - Blood, [[id number]]  (Normal) Collected:  04/16/18 1333    Specimen:  Blood from Blood, Venous Line Updated:  04/17/18 0230     Blood Culture No growth at less than 24 hours    Troponin [[id number]]  (Abnormal) Collected:  04/17/18 0101    Specimen:  Blood Updated:  04/17/18 0143     Troponin I 0.099 (H) ng/mL     Narrative:       Normal Patient Upper Reference Limit (URL) (99th Percentile)=0.03 ng/mL   Non-AMI Illness Reference Limit=0.03-0.11 ng/mL   AMI Confirmation=0.12 ng/mL and above    POC Glucose Once [[id number]]  (Abnormal) Collected:  04/16/18 2045    Specimen:  Blood Updated:  04/16/18 2052     Glucose 348 (H) mg/dL      Comment: Serial Number: [id number]:  [id number]       Troponin [[id number]]  (Abnormal) Collected:  04/16/18 1929    Specimen:  Blood Updated:  04/16/18 2009     Troponin I 0.100 (H) ng/mL     Narrative:       Normal Patient Upper Reference Limit (URL) (99th Percentile)=0.03 ng/mL   Non-AMI Illness Reference Limit=0.03-0.11 ng/mL   AMI Confirmation=0.12 ng/mL and above    Lactic Acid, Reflex [[id number]]  (Abnormal) Collected:  04/16/18 1929    Specimen:  Blood Updated:  04/16/18 2000     Lactate 2.8 (C) mmol/L     Lactic Acid, Reflex Timer (This will reflex a repeat order 3-3:15 hours after ordered.) [[id number]] Collected:  04/16/18 1333    Specimen:  Blood Updated:  04/16/18 1730     Extra Tube Hold for add-ons.     Comment: Auto resulted.       [place] Draw [[id number]] Collected:  04/16/18 1243    Specimen:  Blood Updated:  04/16/18 1532    Narrative:       The following orders were created for panel order [place] Draw.  Procedure                               Abnormality         Status                     ---------                               -----------         ------                      Light Blue Top[[id number]]                                   Final result               Lavender Top[[id number]]                                     Final result               Gold Top - SST[[id number]]                                   Final result               Green Top (No Gel)[[id number]]                               Final result                 Please view results for these tests on the individual orders.    Lavender Top [[id number]] Collected:  04/16/18 1243    Specimen:  Blood Updated:  04/16/18 1532     Extra Tube hold for add-on     Comment: Auto resulted       Light Blue Top [[id number]] Collected:  04/16/18 1243    Specimen:  Blood Updated:  04/16/18 1532     Extra Tube hold for add-on     Comment: Auto resulted       Gold Top - SST [[id number]] Collected:  04/16/18 1243    Specimen:  Blood Updated:  04/16/18 1532     Extra Tube Hold for add-ons.     Comment: Auto resulted.       Green Top (No Gel) [[id number]] Collected:  04/16/18 1243    Specimen:  Blood Updated:  04/16/18 1532     Extra Tube Hold for add-ons.     Comment: Auto resulted.       Lactic Acid, Plasma [[id number]]  (Abnormal) Collected:  04/16/18 1333    Specimen:  Blood Updated:  04/16/18 1421     Lactate 2.4 (C) mmol/L     Blood Gas, Arterial With Co-Ox [[id number]]  (Abnormal) Collected:  04/16/18 1345    Specimen:  Arterial Blood Updated:  04/16/18 1343     Site Arterial: right radial     Phill's Test Positive     pH, Arterial 7.386 pH units      pCO2, Arterial 34.9 (L) mm Hg      pO2, Arterial 64.7 (L) mm Hg      HCO3, Arterial 20.9 (L) mmol/L      Base Excess, Arterial -4.1 mmol/L      O2 Saturation, Arterial 93.5 %      Hemoglobin, Blood Gas 12.5 g/dL      Oxyhemoglobin 91.8 (L) %      Methemoglobin 0.90 %      Carboxyhemoglobin 0.9 %      Modality Room Air     FIO2 21 %     Comprehensive Metabolic Panel [[id number]]  (Abnormal) Collected:  04/16/18 1243    Specimen:  Blood Updated:  04/16/18 1315     Glucose 347 (H) mg/dL       Comment: Glucose >180, Hemoglobin A1C recommended.        BUN 60 (H) mg/dL      Creatinine 1.60 (H) mg/dL      Sodium 141 mmol/L      Potassium 6.4 (C) mmol/L      Chloride 105 mmol/L      CO2 24.0 (L) mmol/L      Calcium 10.5 (H) mg/dL      Total Protein 8.0 g/dL      Albumin 4.10 g/dL      ALT (SGPT) 54 U/L      AST (SGOT) 64 (H) U/L      Alkaline Phosphatase 129 (H) U/L      Total Bilirubin 0.5 mg/dL      eGFR Non African Amer 30 (L) mL/min/1.73      Globulin 3.9 gm/dL      A/G Ratio 1.1 g/dL      BUN/Creatinine Ratio 37.5 (H)     Anion Gap 18.4 mmol/L     Narrative:       The MDRD GFR formula is only valid for adults with stable renal function between ages 18 and 70.    BNP [[id number]]  (Abnormal) Collected:  04/16/18 1243    Specimen:  Blood Updated:  04/16/18 1315     proBNP 7,050.0 (C) pg/mL     Urinalysis, Microscopic Only - Urine, Clean Catch [[id number]]  (Abnormal) Collected:  04/16/18 1301    Specimen:  Urine from Urine, Clean Catch Updated:  04/16/18 1315     RBC, UA 3-5 (A) /HPF      WBC, UA 0-2 (A) /HPF      Bacteria, UA Trace (A) /HPF      Squamous Epithelial Cells, UA 0-2 /HPF      Hyaline Casts, UA None Seen /LPF      Mucus, UA Trace /HPF      Methodology Manual Light Microscopy    Troponin [[id number]]  (Abnormal) Collected:  04/16/18 1243    Specimen:  Blood Updated:  04/16/18 1313     Troponin I 0.108 (H) ng/mL     Narrative:       Normal Patient Upper Reference Limit (URL) (99th Percentile)=0.03 ng/mL   Non-AMI Illness Reference Limit=0.03-0.11 ng/mL   AMI Confirmation=0.12 ng/mL and above    Urinalysis With / Culture If Indicated - Urine, Clean Catch [[id number]]  (Abnormal) Collected:  04/16/18 1301    Specimen:  Urine from Urine, Clean Catch Updated:  04/16/18 1306     Color, UA Yellow     Appearance, UA Clear     pH, UA 6.5     Specific Gravity, UA 1.025     Glucose,  mg/dL (1+) (A)     Ketones, UA Negative     Bilirubin, UA Negative     Blood, UA Small (1+) (A)     Protein, UA >=300  mg/dL (3+) (A)     Leuk Esterase, UA Negative     Nitrite, UA Negative     Urobilinogen, UA 0.2 E.U./dL    CBC & Differential [[id number]] Collected:  04/16/18 1243    Specimen:  Blood Updated:  04/16/18 1303    Narrative:       The following orders were created for panel order CBC & Differential.  Procedure                               Abnormality         Status                     ---------                               -----------         ------                     CBC Auto Differential[[id number]]        Abnormal            Final result                 Please view results for these tests on the individual orders.    CBC Auto Differential [[id number]]  (Abnormal) Collected:  04/16/18 1243    Specimen:  Blood Updated:  04/16/18 1303     WBC 22.62 (H) 10*3/mm3      RBC 4.34 10*6/mm3      Hemoglobin 12.8 g/dL      Hematocrit 39.3 %      MCV 90.6 fL      MCH 29.5 pg      MCHC 32.6 g/dL      RDW 13.7 %      RDW-SD 46.3 fl      MPV 10.1 fL      Platelets 408 (H) 10*3/mm3      Neutrophil % 80.5 (H) %      Lymphocyte % 8.1 (L) %      Monocyte % 10.3 %      Eosinophil % 0.0 %      Basophil % 0.1 %      Immature Grans % 1.0 (H) %      Neutrophils, Absolute 18.21 (H) 10*3/mm3      Lymphocytes, Absolute 1.83 10*3/mm3      Monocytes, Absolute 2.33 (H) 10*3/mm3      Eosinophils, Absolute 0.00 10*3/mm3      Basophils, Absolute 0.03 10*3/mm3      Immature Grans, Absolute 0.22 (H) 10*3/mm3      nRBC 0.0 /100 WBC         Imaging Results (last 24 hours)     Procedure Component Value Units Date/Time    XR Chest 2 View [[id number]] Collected:  04/16/18 1255     Updated:  04/16/18 1257    Narrative:       PROCEDURE: XR CHEST 2 VW-        HISTORY: SOA  triage protocol     COMPARISON: None.     FINDINGS: The heart is normal in size. The mediastinum is unremarkable.  There are emphysematous changes to the lungs. There is patchy airspace  disease in the left lung base which may reflect atelectasis or  pneumonia. There is no pneumothorax.  "There are no acute osseous  abnormalities.           Impression:       Patchy airspace disease in the left lung base which may  reflect atelectasis or pneumonia.           This report was finalized on 4/16/2018 12:55 PM by Hernesto Dickinson M.D..           Physician Progress Notes (last 24 hours) (Notes from 4/16/2018 11:54 AM through 4/17/2018 11:54 AM)      Elvira Garcia MD at 4/17/2018 10:52 AM          Hospitalist Progress Note.    LOS: 1 day    Patient Care Team:  ASAD Lo as PCP - General (Family Medicine)    Chief Complaint:    Shortness of breath and cough    Subjective   Patient seen and examined this morning. She has been hypertensive overnight requiring multiple doses of IV hydralazine. She states that she's feeling much better this morning however. She is less short of breath and feels that her cough has also improved. She has not had a fever overnight and her oxygen saturation is stable on 2L of oxygen. She denies any chest pain, headache, dizziness or palpitations.     Review of Systems:    The pertinent  ROS was done and it is noted above, rest  was negative.    Objective     Vital Signs  BP (!) 193/90 (BP Location: Left arm, Patient Position: Lying)   Pulse 89   Temp 98.8 °F (37.1 °C) (Oral)   Resp 19   Ht (P) 152.4 cm (60\")   Wt (P) 62.2 kg (137 lb 2 oz)   SpO2 96%   BMI (P) 26.78 kg/m²        I/O this shift:  In: 360 [P.O.:360]  Out: 200 [Urine:200]    Intake/Output Summary (Last 24 hours) at 04/17/18 1052  Last data filed at 04/17/18 0900   Gross per 24 hour   Intake             2327 ml   Output              975 ml   Net             1352 ml       Physical Exam:    General Appearance: alert, oriented x 3, no acute distress  HEENT: pupils round and reactive to light, oral mucosa dry, extra occular movements intact.  Neck: supple, no JVD, trachea midline  Lungs: Fair air entry bilaterally with some rhonchi appreciated diffusely in bilateral lung fields   Heart: RRR, normal S1 " and S2, no S3, no rub  Abdomen: soft, non-tender, no palpable bladder, present bowel sounds to auscultation  Extremities: no edema, cyanosis or clubbing.   Neuro: normal speech and mental status, grossly non focal.     Results Review:      Results from last 7 days  Lab Units 04/17/18  0450 04/16/18  1243   SODIUM mmol/L 143 141   POTASSIUM mmol/L 5.2* 6.4*   CHLORIDE mmol/L 112* 105   CO2 mmol/L 23.0* 24.0*   BUN mg/dL 46* 60*   CREATININE mg/dL 1.20 1.60*   CALCIUM mg/dL 9.6 10.5*   BILIRUBIN mg/dL 0.3 0.5   ALK PHOS U/L 77 129*   ALT (SGPT) U/L 44 54   AST (SGOT) U/L 38 64*   GLUCOSE mg/dL 249* 347*       Estimated Creatinine Clearance: 27.2 mL/min (by C-G formula based on SCr of 1.2 mg/dL).                  Results from last 7 days  Lab Units 04/17/18  0450 04/16/18  1243   WBC 10*3/mm3 9.57 22.62*   HEMOGLOBIN g/dL 10.0* 12.8   PLATELETS 10*3/mm3 245 408*               Imaging Results (last 24 hours)     Procedure Component Value Units Date/Time    XR Chest 2 View [[id number]] Collected:  04/16/18 1255     Updated:  04/16/18 1257    Narrative:       PROCEDURE: XR CHEST 2 VW-        HISTORY: SOA  triage protocol     COMPARISON: None.     FINDINGS: The heart is normal in size. The mediastinum is unremarkable.  There are emphysematous changes to the lungs. There is patchy airspace  disease in the left lung base which may reflect atelectasis or  pneumonia. There is no pneumothorax. There are no acute osseous  abnormalities.           Impression:       Patchy airspace disease in the left lung base which may  reflect atelectasis or pneumonia.           This report was finalized on 4/16/2018 12:55 PM by Hernesto Dickinson M.D..          aspirin 81 mg Oral Daily   atorvastatin 10 mg Oral Nightly   azithromycin 500 mg Intravenous Q24H   ceftriaxone 1 g Intravenous Daily   diltiaZEM  mg Oral Daily   enoxaparin 30 mg Subcutaneous Q24H   famotidine 20 mg Oral Daily   Hydrochlorothiazide Oral 25 mg Oral Daily   insulin  aspart 0-9 Units Subcutaneous 4x Daily AC & at Bedtime   insulin detemir 55 Units Subcutaneous Nightly   iron polysaccharides 150 mg Oral Daily   levothyroxine 88 mcg Oral Q AM   metoprolol tartrate 50 mg Oral Q12H   multivitamin with minerals 1 tablet Oral Daily   sennosides-docusate sodium 2 tablet Oral Nightly   vitamin B-12 1,000 mcg Oral Once per day on Sun Tue Thu Sat   vitamin C 1,000 mg Oral Daily   vitamin D3 5,000 Units Oral Daily       sodium chloride 100 mL/hr Last Rate: 100 mL/hr (04/17/18 0453)       Medication Review:   Current Facility-Administered Medications   Medication Dose Route Frequency Provider Last Rate Last Dose   • acetaminophen (TYLENOL) tablet 650 mg  650 mg Oral Q4H PRN Elvira Garcia MD   650 mg at 04/16/18 2033   • aspirin EC tablet 81 mg  81 mg Oral Daily Elvira Garcia MD   81 mg at 04/17/18 0835   • atorvastatin (LIPITOR) tablet 10 mg  10 mg Oral Nightly Elvira Garcia MD   10 mg at 04/16/18 2033   • AZITHROMYCIN 500 MG/250 ML 0.9% NS IVPB (vial-mate)  500 mg Intravenous Q24H Elvira Garcia MD       • cefTRIAXone (ROCEPHIN) IVPB 1 g/50ml dextrose (premix)  1 g Intravenous Daily Elvira Garcia  mL/hr at 04/17/18 0833 1 g at 04/17/18 0833   • dextrose (D50W) solution 25 g  25 g Intravenous Q15 Min PRN Elvira Garcia MD       • dextrose (D50W) solution 50 mL  50 mL Intravenous Q1H PRN AKBAR Matos   50 mL at 04/16/18 1443   • dextrose (GLUTOSE) oral gel 1 tube  1 tube Oral Q15 Min PRN Elvira Garcia MD       • diltiaZEM CD (CARDIZEM CD) 24 hr capsule 120 mg  120 mg Oral Daily Elvira Garcia MD   120 mg at 04/17/18 0835   • enoxaparin (LOVENOX) syringe 30 mg  30 mg Subcutaneous Q24H Elvira Garcia MD   30 mg at 04/16/18 1836   • famotidine (PEPCID) tablet 20 mg  20 mg Oral Daily Elvira Garcia MD   20 mg at 04/17/18 0835   • glucagon (human recombinant) (GLUCAGEN DIAGNOSTIC) injection 1 mg  1 mg Subcutaneous PRN Elvira Garcia MD       •  hydrALAZINE (APRESOLINE) injection 20 mg  20 mg Intravenous Q6H PRN Elvira Garcia MD   20 mg at 04/17/18 0050   • hydrochlorothiazide (HYDRODIURIL) tablet 25 mg  25 mg Oral Daily Elvira Garcia MD   25 mg at 04/17/18 0835   • insulin aspart (novoLOG) injection 0-9 Units  0-9 Units Subcutaneous 4x Daily AC & at Bedtime Elvira Garcia MD   4 Units at 04/17/18 0648   • insulin detemir (LEVEMIR) injection 55 Units  55 Units Subcutaneous Nightly Elvira Garcia MD   55 Units at 04/16/18 2100   • ipratropium-albuterol (DUO-NEB) nebulizer solution 3 mL  3 mL Nebulization Q4H PRN Elvira Garcia MD   3 mL at 04/17/18 0528   • iron polysaccharides (NIFEREX) capsule 150 mg  150 mg Oral Daily Elvira Garcia MD   150 mg at 04/17/18 0836   • levothyroxine (SYNTHROID, LEVOTHROID) tablet 88 mcg  88 mcg Oral Q AM Elvira Garcia MD   88 mcg at 04/17/18 0648   • metoprolol tartrate (LOPRESSOR) tablet 50 mg  50 mg Oral Q12H Kaylah Corona DO   50 mg at 04/17/18 0836   • multivitamin with minerals 1 tablet  1 tablet Oral Daily Elvira Garcia MD   1 tablet at 04/17/18 0836   • nitroglycerin (NITROSTAT) ointment 1 inch  1 inch Topical Q6H PRN Elvira Garcia MD       • ondansetron (ZOFRAN) tablet 4 mg  4 mg Oral Q6H PRN Elvira Garcia MD        Or   • ondansetron ODT (ZOFRAN-ODT) disintegrating tablet 4 mg  4 mg Oral Q6H PRN Elvira Garcia MD        Or   • ondansetron (ZOFRAN) injection 4 mg  4 mg Intravenous Q6H PRN Elvira Garcia MD       • sennosides-docusate sodium (SENOKOT-S) 8.6-50 MG tablet 2 tablet  2 tablet Oral Nightly Elvira Garcia MD   2 tablet at 04/16/18 2032   • sodium chloride 0.9 % flush 1-10 mL  1-10 mL Intravenous PRN Elvira Garcia MD       • sodium chloride 0.9 % flush 10 mL  10 mL Intravenous PRN Benny Teran MD       • sodium chloride 0.9 % infusion  100 mL/hr Intravenous Continuous Elvira Garcia  mL/hr at 04/17/18 0453 100 mL/hr at 04/17/18 0453   • vitamin B-12  (CYANOCOBALAMIN) tablet 1,000 mcg  1,000 mcg Oral Once per day on Sun Tue Thu Sat Elvira Garcia MD   1,000 mcg at 04/17/18 0836   • vitamin C (ASCORBIC ACID) tablet 1,000 mg  1,000 mg Oral Daily Elvira Garcia MD   1,000 mg at 04/17/18 0836   • vitamin D3 capsule 5,000 Units  5,000 Units Oral Daily Elvira Garcia MD   5,000 Units at 04/17/18 0836     Active Problems:    Pneumonia of lower lobe due to infectious organism    Assessment/Plan   1. Sepsis due to # 2, present on admission  2. Left lower lobe pneumonia, present on admission  3. Acute hyperkalemia, possibly due to underlying CKD with medication side effect - ACE-I  4. Hyperglycemia   5. Elevated troponin level, likely supply demand mismatch, type 2 MI  6. CKD stage III  7. History of atrial fibrillation, not on anticoagulation, in NSR currently   8. History of coronary artery disease s/p stent   9. Hypertensive urgency, present on admission  10. Hyperlipidemia  11. Hearing impairment     Patient has been placed back on Cardizem as well as Hydrochlorothiazide in addition to metoprolol. Will continue to hold Triamterene and Quinapril due to hyperkalemia.   Echo report reviewed. She has diastolic changes with preserved ejection fraction.   I will discontinue IV fluids at this time as she has high likelihood of getting fluid overloaded.   Though she has elevated lactic acid, she clinically appears to be improving.   Will get PT/OT evaluation.     Likely DC in 1-2 days.   Details were discussed with the patient.   I also discussed the details with the nursing staff.    Rest as ordered.    Elvira Garcia MD  04/17/18  10:52 AM      Electronically signed by Elvira Garcia MD at 4/17/2018 11:08 AM       Consult Notes (last 24 hours) (Notes from 4/16/2018 11:54 AM through 4/17/2018 11:54 AM)     No notes of this type exist for this encounter.

## 2018-04-17 NOTE — PLAN OF CARE
Problem: Fall Risk (Adult)  Goal: Identify Related Risk Factors and Signs and Symptoms  Outcome: Ongoing (interventions implemented as appropriate)    Goal: Absence of Fall  Outcome: Ongoing (interventions implemented as appropriate)      Problem: Skin Injury Risk (Adult)  Goal: Identify Related Risk Factors and Signs and Symptoms  Outcome: Ongoing (interventions implemented as appropriate)    Goal: Skin Health and Integrity  Outcome: Ongoing (interventions implemented as appropriate)

## 2018-04-17 NOTE — PROGRESS NOTES
"Hospitalist Progress Note.    LOS: 1 day    Patient Care Team:  ASAD Lo as PCP - General (Family Medicine)    Chief Complaint:    Shortness of breath and cough    Subjective   Patient seen and examined this morning. She has been hypertensive overnight requiring multiple doses of IV hydralazine. She states that she's feeling much better this morning however. She is less short of breath and feels that her cough has also improved. She has not had a fever overnight and her oxygen saturation is stable on 2L of oxygen. She denies any chest pain, headache, dizziness or palpitations.     Review of Systems:    The pertinent  ROS was done and it is noted above, rest  was negative.    Objective     Vital Signs  BP (!) 193/90 (BP Location: Left arm, Patient Position: Lying)   Pulse 89   Temp 98.8 °F (37.1 °C) (Oral)   Resp 19   Ht (P) 152.4 cm (60\")   Wt (P) 62.2 kg (137 lb 2 oz)   SpO2 96%   BMI (P) 26.78 kg/m²       I/O this shift:  In: 360 [P.O.:360]  Out: 200 [Urine:200]    Intake/Output Summary (Last 24 hours) at 04/17/18 1052  Last data filed at 04/17/18 0900   Gross per 24 hour   Intake             2327 ml   Output              975 ml   Net             1352 ml       Physical Exam:    General Appearance: alert, oriented x 3, no acute distress  HEENT: pupils round and reactive to light, oral mucosa dry, extra occular movements intact.  Neck: supple, no JVD, trachea midline  Lungs: Fair air entry bilaterally with some rhonchi appreciated diffusely in bilateral lung fields   Heart: RRR, normal S1 and S2, no S3, no rub  Abdomen: soft, non-tender, no palpable bladder, present bowel sounds to auscultation  Extremities: no edema, cyanosis or clubbing.   Neuro: normal speech and mental status, grossly non focal.     Results Review:      Results from last 7 days  Lab Units 04/17/18  0450 04/16/18  1243   SODIUM mmol/L 143 141   POTASSIUM mmol/L 5.2* 6.4*   CHLORIDE mmol/L 112* 105   CO2 mmol/L 23.0* 24.0*   BUN " mg/dL 46* 60*   CREATININE mg/dL 1.20 1.60*   CALCIUM mg/dL 9.6 10.5*   BILIRUBIN mg/dL 0.3 0.5   ALK PHOS U/L 77 129*   ALT (SGPT) U/L 44 54   AST (SGOT) U/L 38 64*   GLUCOSE mg/dL 249* 347*       Estimated Creatinine Clearance: 27.2 mL/min (by C-G formula based on SCr of 1.2 mg/dL).                  Results from last 7 days  Lab Units 04/17/18  0450 04/16/18  1243   WBC 10*3/mm3 9.57 22.62*   HEMOGLOBIN g/dL 10.0* 12.8   PLATELETS 10*3/mm3 245 408*               Imaging Results (last 24 hours)     Procedure Component Value Units Date/Time    XR Chest 2 View [[id number]] Collected:  04/16/18 1255     Updated:  04/16/18 1257    Narrative:       PROCEDURE: XR CHEST 2 VW-        HISTORY: SOA  triage protocol     COMPARISON: None.     FINDINGS: The heart is normal in size. The mediastinum is unremarkable.  There are emphysematous changes to the lungs. There is patchy airspace  disease in the left lung base which may reflect atelectasis or  pneumonia. There is no pneumothorax. There are no acute osseous  abnormalities.           Impression:       Patchy airspace disease in the left lung base which may  reflect atelectasis or pneumonia.           This report was finalized on 4/16/2018 12:55 PM by Hernesto Dickinson M.D..          aspirin 81 mg Oral Daily   atorvastatin 10 mg Oral Nightly   azithromycin 500 mg Intravenous Q24H   ceftriaxone 1 g Intravenous Daily   diltiaZEM  mg Oral Daily   enoxaparin 30 mg Subcutaneous Q24H   famotidine 20 mg Oral Daily   Hydrochlorothiazide Oral 25 mg Oral Daily   insulin aspart 0-9 Units Subcutaneous 4x Daily AC & at Bedtime   insulin detemir 55 Units Subcutaneous Nightly   iron polysaccharides 150 mg Oral Daily   levothyroxine 88 mcg Oral Q AM   metoprolol tartrate 50 mg Oral Q12H   multivitamin with minerals 1 tablet Oral Daily   sennosides-docusate sodium 2 tablet Oral Nightly   vitamin B-12 1,000 mcg Oral Once per day on Sun Tue Thu Sat   vitamin C 1,000 mg Oral Daily    vitamin D3 5,000 Units Oral Daily       sodium chloride 100 mL/hr Last Rate: 100 mL/hr (04/17/18 0453)       Medication Review:   Current Facility-Administered Medications   Medication Dose Route Frequency Provider Last Rate Last Dose   • acetaminophen (TYLENOL) tablet 650 mg  650 mg Oral Q4H PRN Elvira Garcia MD   650 mg at 04/16/18 2033   • aspirin EC tablet 81 mg  81 mg Oral Daily Elvira Garcia MD   81 mg at 04/17/18 0835   • atorvastatin (LIPITOR) tablet 10 mg  10 mg Oral Nightly Elvira Garcia MD   10 mg at 04/16/18 2033   • AZITHROMYCIN 500 MG/250 ML 0.9% NS IVPB (vial-mate)  500 mg Intravenous Q24H Elvira Garcia MD       • cefTRIAXone (ROCEPHIN) IVPB 1 g/50ml dextrose (premix)  1 g Intravenous Daily Elvira Garcia  mL/hr at 04/17/18 0833 1 g at 04/17/18 0833   • dextrose (D50W) solution 25 g  25 g Intravenous Q15 Min PRN Elvira Garcia MD       • dextrose (D50W) solution 50 mL  50 mL Intravenous Q1H PRN AKBAR Matos   50 mL at 04/16/18 1443   • dextrose (GLUTOSE) oral gel 1 tube  1 tube Oral Q15 Min PRN Elvira Garcia MD       • diltiaZEM CD (CARDIZEM CD) 24 hr capsule 120 mg  120 mg Oral Daily Elvira Garcia MD   120 mg at 04/17/18 0835   • enoxaparin (LOVENOX) syringe 30 mg  30 mg Subcutaneous Q24H Elvira Garcia MD   30 mg at 04/16/18 1836   • famotidine (PEPCID) tablet 20 mg  20 mg Oral Daily Elvira Garcia MD   20 mg at 04/17/18 0835   • glucagon (human recombinant) (GLUCAGEN DIAGNOSTIC) injection 1 mg  1 mg Subcutaneous PRN Elvira Garcia MD       • hydrALAZINE (APRESOLINE) injection 20 mg  20 mg Intravenous Q6H PRN Elvira Garcia MD   20 mg at 04/17/18 0050   • hydrochlorothiazide (HYDRODIURIL) tablet 25 mg  25 mg Oral Daily Elvira Garcia MD   25 mg at 04/17/18 0835   • insulin aspart (novoLOG) injection 0-9 Units  0-9 Units Subcutaneous 4x Daily AC & at Bedtime Elvira Garcia MD   4 Units at 04/17/18 0648   • insulin detemir (LEVEMIR) injection  55 Units  55 Units Subcutaneous Nightly Elvira Garcia MD   55 Units at 04/16/18 2100   • ipratropium-albuterol (DUO-NEB) nebulizer solution 3 mL  3 mL Nebulization Q4H PRN Elvira Garcia MD   3 mL at 04/17/18 0528   • iron polysaccharides (NIFEREX) capsule 150 mg  150 mg Oral Daily Elvira Garcia MD   150 mg at 04/17/18 0836   • levothyroxine (SYNTHROID, LEVOTHROID) tablet 88 mcg  88 mcg Oral Q AM Elvira Garcia MD   88 mcg at 04/17/18 0648   • metoprolol tartrate (LOPRESSOR) tablet 50 mg  50 mg Oral Q12H Kaylah Corona DO   50 mg at 04/17/18 0836   • multivitamin with minerals 1 tablet  1 tablet Oral Daily Elvira Garcia MD   1 tablet at 04/17/18 0836   • nitroglycerin (NITROSTAT) ointment 1 inch  1 inch Topical Q6H PRN Elvira Garcia MD       • ondansetron (ZOFRAN) tablet 4 mg  4 mg Oral Q6H PRN Elvira Garcia MD        Or   • ondansetron ODT (ZOFRAN-ODT) disintegrating tablet 4 mg  4 mg Oral Q6H PRN Elvira Garcia MD        Or   • ondansetron (ZOFRAN) injection 4 mg  4 mg Intravenous Q6H PRN Elvira Garcia MD       • sennosides-docusate sodium (SENOKOT-S) 8.6-50 MG tablet 2 tablet  2 tablet Oral Nightly Elvira Garcia MD   2 tablet at 04/16/18 2032   • sodium chloride 0.9 % flush 1-10 mL  1-10 mL Intravenous PRN Elvira Garcia MD       • sodium chloride 0.9 % flush 10 mL  10 mL Intravenous PRN Benny Teran MD       • sodium chloride 0.9 % infusion  100 mL/hr Intravenous Continuous Elvira Garcia  mL/hr at 04/17/18 0453 100 mL/hr at 04/17/18 0453   • vitamin B-12 (CYANOCOBALAMIN) tablet 1,000 mcg  1,000 mcg Oral Once per day on Sun Tue Thu Sat Elvira Garcia MD   1,000 mcg at 04/17/18 0836   • vitamin C (ASCORBIC ACID) tablet 1,000 mg  1,000 mg Oral Daily Elvira Garcia MD   1,000 mg at 04/17/18 0836   • vitamin D3 capsule 5,000 Units  5,000 Units Oral Daily Elvira Garcia MD   5,000 Units at 04/17/18 0836     Active Problems:    Pneumonia of lower lobe due to  infectious organism    Assessment/Plan   1. Sepsis due to # 2, present on admission  2. Left lower lobe pneumonia, present on admission  3. Acute hyperkalemia, possibly due to underlying CKD with medication side effect - ACE-I  4. Hyperglycemia   5. Elevated troponin level, likely supply demand mismatch, type 2 MI  6. CKD stage III  7. History of atrial fibrillation, not on anticoagulation, in NSR currently   8. History of coronary artery disease s/p stent   9. Hypertensive urgency, present on admission  10. Hyperlipidemia  11. Hearing impairment     Patient has been placed back on Cardizem as well as Hydrochlorothiazide in addition to metoprolol. Will continue to hold Triamterene and Quinapril due to hyperkalemia.   Echo report reviewed. She has diastolic changes with preserved ejection fraction.   I will discontinue IV fluids at this time as she has high likelihood of getting fluid overloaded.   Though she has elevated lactic acid, she clinically appears to be improving.   Will get PT/OT evaluation.     Likely DC in 1-2 days.   Details were discussed with the patient.   I also discussed the details with the nursing staff.    Rest as ordered.    Elvira Garcia MD  04/17/18  10:52 AM

## 2018-04-18 ENCOUNTER — APPOINTMENT (OUTPATIENT)
Dept: GENERAL RADIOLOGY | Facility: HOSPITAL | Age: 83
End: 2018-04-18

## 2018-04-18 LAB
ANION GAP SERPL CALCULATED.3IONS-SCNC: 14.4 MMOL/L (ref 10–20)
BUN BLD-MCNC: 33 MG/DL (ref 7–20)
BUN/CREAT SERPL: 25.4 (ref 7.1–23.5)
CALCIUM SPEC-SCNC: 9.6 MG/DL (ref 8.4–10.2)
CHLORIDE SERPL-SCNC: 108 MMOL/L (ref 98–107)
CO2 SERPL-SCNC: 26 MMOL/L (ref 26–30)
CREAT BLD-MCNC: 1.3 MG/DL (ref 0.6–1.3)
DEPRECATED RDW RBC AUTO: 47.5 FL (ref 37–54)
ERYTHROCYTE [DISTWIDTH] IN BLOOD BY AUTOMATED COUNT: 14.1 % (ref 11.5–14.5)
GFR SERPL CREATININE-BSD FRML MDRD: 39 ML/MIN/1.73
GLUCOSE BLD-MCNC: 67 MG/DL (ref 74–98)
GLUCOSE BLDC GLUCOMTR-MCNC: 162 MG/DL (ref 70–130)
GLUCOSE BLDC GLUCOMTR-MCNC: 179 MG/DL (ref 70–130)
GLUCOSE BLDC GLUCOMTR-MCNC: 184 MG/DL (ref 70–130)
GLUCOSE BLDC GLUCOMTR-MCNC: 68 MG/DL (ref 70–130)
HCT VFR BLD AUTO: 31.8 % (ref 37–47)
HGB BLD-MCNC: 10.2 G/DL (ref 12–16)
MCH RBC QN AUTO: 29.3 PG (ref 27–31)
MCHC RBC AUTO-ENTMCNC: 32.1 G/DL (ref 30–37)
MCV RBC AUTO: 91.4 FL (ref 81–99)
PLATELET # BLD AUTO: 253 10*3/MM3 (ref 130–400)
PMV BLD AUTO: 10 FL (ref 6–12)
POTASSIUM BLD-SCNC: 4.4 MMOL/L (ref 3.5–5.1)
RBC # BLD AUTO: 3.48 10*6/MM3 (ref 4.2–5.4)
SODIUM BLD-SCNC: 144 MMOL/L (ref 137–145)
WBC NRBC COR # BLD: 9.69 10*3/MM3 (ref 4.8–10.8)

## 2018-04-18 PROCEDURE — 25010000002 ENOXAPARIN PER 10 MG: Performed by: INTERNAL MEDICINE

## 2018-04-18 PROCEDURE — 94799 UNLISTED PULMONARY SVC/PX: CPT

## 2018-04-18 PROCEDURE — 87205 SMEAR GRAM STAIN: CPT | Performed by: INTERNAL MEDICINE

## 2018-04-18 PROCEDURE — 63710000001 INSULIN ASPART PER 5 UNITS: Performed by: INTERNAL MEDICINE

## 2018-04-18 PROCEDURE — 87070 CULTURE OTHR SPECIMN AEROBIC: CPT | Performed by: INTERNAL MEDICINE

## 2018-04-18 PROCEDURE — 63710000001 INSULIN DETEMIR PER 5 UNITS: Performed by: INTERNAL MEDICINE

## 2018-04-18 PROCEDURE — 25010000002 AZITHROMYCIN: Performed by: INTERNAL MEDICINE

## 2018-04-18 PROCEDURE — 99233 SBSQ HOSP IP/OBS HIGH 50: CPT | Performed by: INTERNAL MEDICINE

## 2018-04-18 PROCEDURE — 71045 X-RAY EXAM CHEST 1 VIEW: CPT

## 2018-04-18 PROCEDURE — 85027 COMPLETE CBC AUTOMATED: CPT | Performed by: INTERNAL MEDICINE

## 2018-04-18 PROCEDURE — 80048 BASIC METABOLIC PNL TOTAL CA: CPT | Performed by: INTERNAL MEDICINE

## 2018-04-18 PROCEDURE — 82962 GLUCOSE BLOOD TEST: CPT

## 2018-04-18 PROCEDURE — 25010000002 CEFEPIME PER 500 MG: Performed by: INTERNAL MEDICINE

## 2018-04-18 RX ORDER — IPRATROPIUM BROMIDE AND ALBUTEROL SULFATE 2.5; .5 MG/3ML; MG/3ML
3 SOLUTION RESPIRATORY (INHALATION)
Status: DISCONTINUED | OUTPATIENT
Start: 2018-04-18 | End: 2018-04-20 | Stop reason: HOSPADM

## 2018-04-18 RX ORDER — BUDESONIDE 0.5 MG/2ML
0.5 INHALANT ORAL
Status: DISCONTINUED | OUTPATIENT
Start: 2018-04-18 | End: 2018-04-20 | Stop reason: HOSPADM

## 2018-04-18 RX ORDER — ACETYLCYSTEINE 200 MG/ML
3 SOLUTION ORAL; RESPIRATORY (INHALATION)
Status: DISCONTINUED | OUTPATIENT
Start: 2018-04-18 | End: 2018-04-20 | Stop reason: HOSPADM

## 2018-04-18 RX ORDER — HYDRALAZINE HYDROCHLORIDE 25 MG/1
50 TABLET, FILM COATED ORAL EVERY 8 HOURS SCHEDULED
Status: DISCONTINUED | OUTPATIENT
Start: 2018-04-18 | End: 2018-04-20 | Stop reason: HOSPADM

## 2018-04-18 RX ADMIN — MULTIPLE VITAMINS W/ MINERALS TAB 1 TABLET: TAB at 09:25

## 2018-04-18 RX ADMIN — Medication 2 TABLET: at 21:09

## 2018-04-18 RX ADMIN — CEFEPIME HYDROCHLORIDE 2 G: 2 INJECTION, SOLUTION INTRAVENOUS at 13:22

## 2018-04-18 RX ADMIN — IPRATROPIUM BROMIDE AND ALBUTEROL SULFATE 3 ML: .5; 3 SOLUTION RESPIRATORY (INHALATION) at 12:47

## 2018-04-18 RX ADMIN — FAMOTIDINE 20 MG: 20 TABLET ORAL at 09:25

## 2018-04-18 RX ADMIN — IPRATROPIUM BROMIDE AND ALBUTEROL SULFATE 3 ML: .5; 3 SOLUTION RESPIRATORY (INHALATION) at 07:22

## 2018-04-18 RX ADMIN — METOPROLOL TARTRATE 50 MG: 50 TABLET ORAL at 09:25

## 2018-04-18 RX ADMIN — OXYCODONE HYDROCHLORIDE AND ACETAMINOPHEN 1000 MG: 500 TABLET ORAL at 09:25

## 2018-04-18 RX ADMIN — ATORVASTATIN CALCIUM 10 MG: 10 TABLET, FILM COATED ORAL at 21:09

## 2018-04-18 RX ADMIN — BUDESONIDE 0.5 MG: 0.5 INHALANT RESPIRATORY (INHALATION) at 19:40

## 2018-04-18 RX ADMIN — INSULIN ASPART 2 UNITS: 100 INJECTION, SOLUTION INTRAVENOUS; SUBCUTANEOUS at 21:08

## 2018-04-18 RX ADMIN — AZITHROMYCIN 500 MG: 500 INJECTION, POWDER, LYOPHILIZED, FOR SOLUTION INTRAVENOUS at 15:07

## 2018-04-18 RX ADMIN — DILTIAZEM HYDROCHLORIDE 120 MG: 120 CAPSULE, COATED, EXTENDED RELEASE ORAL at 09:25

## 2018-04-18 RX ADMIN — ASPIRIN 81 MG: 81 TABLET, COATED ORAL at 09:25

## 2018-04-18 RX ADMIN — INSULIN ASPART 2 UNITS: 100 INJECTION, SOLUTION INTRAVENOUS; SUBCUTANEOUS at 12:08

## 2018-04-18 RX ADMIN — METOPROLOL TARTRATE 50 MG: 50 TABLET ORAL at 21:09

## 2018-04-18 RX ADMIN — HYDROCHLOROTHIAZIDE 25 MG: 25 TABLET ORAL at 09:25

## 2018-04-18 RX ADMIN — INSULIN ASPART 2 UNITS: 100 INJECTION, SOLUTION INTRAVENOUS; SUBCUTANEOUS at 17:22

## 2018-04-18 RX ADMIN — POLYSACCHARIDE-IRON COMPLEX 150 MG: 150 CAPSULE ORAL at 09:25

## 2018-04-18 RX ADMIN — LEVOTHYROXINE SODIUM 88 MCG: 0.09 TABLET ORAL at 06:28

## 2018-04-18 RX ADMIN — HYDRALAZINE HYDROCHLORIDE 50 MG: 25 TABLET ORAL at 15:07

## 2018-04-18 RX ADMIN — ENOXAPARIN SODIUM 30 MG: 30 INJECTION SUBCUTANEOUS at 17:22

## 2018-04-18 RX ADMIN — HYDRALAZINE HYDROCHLORIDE 50 MG: 25 TABLET ORAL at 21:09

## 2018-04-18 RX ADMIN — IPRATROPIUM BROMIDE AND ALBUTEROL SULFATE 3 ML: .5; 3 SOLUTION RESPIRATORY (INHALATION) at 19:40

## 2018-04-18 RX ADMIN — CHOLECALCIFEROL CAP 125 MCG (5000 UNIT) 5000 UNITS: 125 CAP at 09:25

## 2018-04-18 RX ADMIN — IPRATROPIUM BROMIDE AND ALBUTEROL SULFATE 3 ML: .5; 3 SOLUTION RESPIRATORY (INHALATION) at 16:15

## 2018-04-18 RX ADMIN — INSULIN DETEMIR 45 UNITS: 100 INJECTION, SOLUTION SUBCUTANEOUS at 21:07

## 2018-04-18 NOTE — PROGRESS NOTES
"Hospitalist Progress Note.    LOS: 2 days    Patient Care Team:  ASAD Lo as PCP - General (Family Medicine)    Chief Complaint:    Shortness of breath and cough    Subjective   Patient seen and examined this morning. She reports feeling worse today with worsening shortness of breath and cough. She has not had a fever or chills, but feels generally weak. Denies any chest pain or palpitations.   Her BMP glucose this morning shows a glucose of 67.     Review of Systems:    The pertinent  ROS was done and it is noted above, rest  was negative.    Objective     Vital Signs  BP (!) 190/50 (BP Location: Right arm, Patient Position: Lying)   Pulse 84   Temp 97.5 °F (36.4 °C) (Oral)   Resp 16   Ht 152.4 cm (60\")   Wt 62.6 kg (137 lb 14.4 oz)   SpO2 97%   BMI 26.93 kg/m²       I/O this shift:  In: 100 [P.O.:100]  Out: -     Intake/Output Summary (Last 24 hours) at 04/18/18 1114  Last data filed at 04/18/18 0823   Gross per 24 hour   Intake              100 ml   Output             1125 ml   Net            -1025 ml       Physical Exam:    General Appearance: alert, oriented x 3, no acute distress  HEENT: pupils round and reactive to light, oral mucosa dry, extra occular movements intact.  Neck: supple, no JVD, trachea midline  Lungs: Diffuse expiratory wheezing in bilateral lung fields, rhonchi appreciated bilaterally  Heart: RRR, normal S1 and S2, no S3, no rub  Abdomen: soft, non-tender, no palpable bladder, present bowel sounds to auscultation  Extremities: no edema, cyanosis or clubbing.   Neuro: normal speech and mental status, grossly non focal.     Results Review:      Results from last 7 days  Lab Units 04/18/18  0520 04/17/18  0450 04/16/18  1243   SODIUM mmol/L 144 143 141   POTASSIUM mmol/L 4.4 5.2* 6.4*   CHLORIDE mmol/L 108* 112* 105   CO2 mmol/L 26.0 23.0* 24.0*   BUN mg/dL 33* 46* 60*   CREATININE mg/dL 1.30 1.20 1.60*   CALCIUM mg/dL 9.6 9.6 10.5*   BILIRUBIN mg/dL  --  0.3 0.5   ALK PHOS U/L  " --  77 129*   ALT (SGPT) U/L  --  44 54   AST (SGOT) U/L  --  38 64*   GLUCOSE mg/dL 67* 249* 347*       Estimated Creatinine Clearance: 25.2 mL/min (by C-G formula based on SCr of 1.3 mg/dL).                  Results from last 7 days  Lab Units 04/18/18  0520 04/17/18  0450 04/16/18  1243   WBC 10*3/mm3 9.69 9.57 22.62*   HEMOGLOBIN g/dL 10.2* 10.0* 12.8   PLATELETS 10*3/mm3 253 245 408*               Imaging Results (last 24 hours)     Procedure Component Value Units Date/Time    XR Chest 1 View [[id number]] Collected:  04/18/18 0836     Updated:  04/18/18 0839    Narrative:       PROCEDURE: XR CHEST 1 VW-     HISTORY: cough/soa; J18.1-Lobar pneumonia, unspecified organism;  E87.5-Hyperkalemia; R74.8-Abnormal levels of other serum enzymes;  R79.89-Other specified abnormal findings of blood chemistry     COMPARISON: April 16, 2018.     FINDINGS: The heart is normal in size. The mediastinum is unremarkable.  There has been interval development of bilateral airspace disease more  pronounced on the right than on the left which may reflect a pneumonia.  There is no pneumothorax.  There are no acute osseous abnormalities.           Impression:       Development of bilateral airspace disease suspicious for a  pneumonia.     Continued followup is recommended.     This report was finalized on 4/18/2018 8:37 AM by Hernesto Dickinson M.D..          aspirin 81 mg Oral Daily   atorvastatin 10 mg Oral Nightly   azithromycin 500 mg Intravenous Q24H   budesonide 0.5 mg Nebulization BID - RT   ceftriaxone 1 g Intravenous Daily   diltiaZEM  mg Oral Daily   enoxaparin 30 mg Subcutaneous Q24H   famotidine 20 mg Oral Daily   Hydrochlorothiazide Oral 25 mg Oral Daily   insulin aspart 0-9 Units Subcutaneous 4x Daily AC & at Bedtime   insulin detemir 45 Units Subcutaneous Nightly   ipratropium-albuterol 3 mL Nebulization 4x Daily - RT   iron polysaccharides 150 mg Oral Daily   levothyroxine 88 mcg Oral Q AM   metoprolol tartrate 50  mg Oral Q12H   multivitamin with minerals 1 tablet Oral Daily   sennosides-docusate sodium 2 tablet Oral Nightly   vitamin B-12 1,000 mcg Oral Once per day on Sun Tue Thu Sat   vitamin C 1,000 mg Oral Daily   vitamin D3 5,000 Units Oral Daily          Medication Review:   Current Facility-Administered Medications   Medication Dose Route Frequency Provider Last Rate Last Dose   • acetaminophen (TYLENOL) tablet 650 mg  650 mg Oral Q4H PRN Elvira Garcia MD   650 mg at 04/17/18 2312   • aspirin EC tablet 81 mg  81 mg Oral Daily Elvira Garcia MD   81 mg at 04/18/18 0925   • atorvastatin (LIPITOR) tablet 10 mg  10 mg Oral Nightly Elvira Garcia MD   10 mg at 04/17/18 2031   • AZITHROMYCIN 500 MG/250 ML 0.9% NS IVPB (vial-mate)  500 mg Intravenous Q24H Elvira Garcia MD   500 mg at 04/17/18 1432   • budesonide (PULMICORT) nebulizer solution 0.5 mg  0.5 mg Nebulization BID - RT Elvira Garcia MD       • cefTRIAXone (ROCEPHIN) IVPB 1 g/50ml dextrose (premix)  1 g Intravenous Daily Elvira Garcia  mL/hr at 04/17/18 0833 1 g at 04/17/18 0833   • dextrose (D50W) solution 25 g  25 g Intravenous Q15 Min PRN Elvira Garcia MD       • dextrose (D50W) solution 50 mL  50 mL Intravenous Q1H PRN AKBAR Matos   50 mL at 04/16/18 1443   • dextrose (GLUTOSE) oral gel 1 tube  1 tube Oral Q15 Min PRN Elvira Garcia MD       • diltiaZEM CD (CARDIZEM CD) 24 hr capsule 120 mg  120 mg Oral Daily Elvira Garcia MD   120 mg at 04/18/18 0925   • enoxaparin (LOVENOX) syringe 30 mg  30 mg Subcutaneous Q24H Elvira Garcia MD   30 mg at 04/17/18 1754   • famotidine (PEPCID) tablet 20 mg  20 mg Oral Daily Elvira Garcia MD   20 mg at 04/18/18 0925   • glucagon (human recombinant) (GLUCAGEN DIAGNOSTIC) injection 1 mg  1 mg Subcutaneous PRN Elvira Garcia MD       • hydrALAZINE (APRESOLINE) injection 20 mg  20 mg Intravenous Q6H PRN Elvira Garcia MD   20 mg at 04/17/18 1250   • hydrochlorothiazide  (HYDRODIURIL) tablet 25 mg  25 mg Oral Daily Elvira Garcia MD   25 mg at 04/18/18 0925   • insulin aspart (novoLOG) injection 0-9 Units  0-9 Units Subcutaneous 4x Daily AC & at Bedtime Elvira Garcia MD   2 Units at 04/17/18 2031   • insulin detemir (LEVEMIR) injection 45 Units  45 Units Subcutaneous Nightly Elvira Garcia MD       • ipratropium-albuterol (DUO-NEB) nebulizer solution 3 mL  3 mL Nebulization Q4H PRN Elvira Garcia MD   3 mL at 04/18/18 0722   • ipratropium-albuterol (DUO-NEB) nebulizer solution 3 mL  3 mL Nebulization 4x Daily - RT Elvira Garcia MD       • iron polysaccharides (NIFEREX) capsule 150 mg  150 mg Oral Daily Elvira Garcia MD   150 mg at 04/18/18 0925   • levothyroxine (SYNTHROID, LEVOTHROID) tablet 88 mcg  88 mcg Oral Q AM Elvira Garcia MD   88 mcg at 04/18/18 0628   • metoprolol tartrate (LOPRESSOR) tablet 50 mg  50 mg Oral Q12H Kaylah Corona DO   50 mg at 04/18/18 0925   • multivitamin with minerals 1 tablet  1 tablet Oral Daily Elvira Garcia MD   1 tablet at 04/18/18 0925   • nitroglycerin (NITROSTAT) ointment 1 inch  1 inch Topical Q6H PRN Elvira Garcia MD       • ondansetron (ZOFRAN) tablet 4 mg  4 mg Oral Q6H PRN Elvira Garcia MD        Or   • ondansetron ODT (ZOFRAN-ODT) disintegrating tablet 4 mg  4 mg Oral Q6H PRN Elvira Garcia MD        Or   • ondansetron (ZOFRAN) injection 4 mg  4 mg Intravenous Q6H PRN Elvira Garcia MD       • sennosides-docusate sodium (SENOKOT-S) 8.6-50 MG tablet 2 tablet  2 tablet Oral Nightly Elvira Garcia MD   2 tablet at 04/17/18 2031   • sodium chloride 0.9 % flush 1-10 mL  1-10 mL Intravenous PRN Elvira Garcia MD       • sodium chloride 0.9 % flush 10 mL  10 mL Intravenous PRN Benny Teran MD       • vitamin B-12 (CYANOCOBALAMIN) tablet 1,000 mcg  1,000 mcg Oral Once per day on Sun Tue Thu Sat Elvira Garcia MD   1,000 mcg at 04/17/18 0836   • vitamin C (ASCORBIC ACID) tablet 1,000 mg  1,000 mg Oral  Daily Elvira Garcia MD   1,000 mg at 04/18/18 0925   • vitamin D3 capsule 5,000 Units  5,000 Units Oral Daily Elvira Garcia MD   5,000 Units at 04/18/18 0925     Active Problems:    Pneumonia of lower lobe due to infectious organism    Assessment/Plan   1. Sepsis due to # 2, present on admission  2. Left lower lobe pneumonia, present on admission  3. Acute hyperkalemia, possibly due to underlying CKD with medication side effect - ACE-I  4. Hyperglycemia   5. Elevated troponin level, likely supply demand mismatch, type 2 MI, echo shows preserved EF with diastolic changes  6. CKD stage III  7. History of atrial fibrillation, not on anticoagulation, in NSR currently   8. History of coronary artery disease s/p stent   9. Hypertensive urgency, present on admission  10. Hyperlipidemia  11. Hearing impairment     A repeat CXR was ordered this morning and I have reviewed it. She appears to have worsening infiltrates bilaterally which explains her worsening symptoms. I will keep her on Azithromycin, and change Ceftriaxone to Cefepime.   Will f/u sputum culture.   Her BP remains persistently elevated, will place on hydralazine 50mg q8h.   Holding Triamterene and Quinapril due to hyperkalemia on admission.   PT/OT     Details were discussed with the patient and family member at the bedside.   I also discussed the details with the nursing staff.    Rest as ordered.    Elvira Garcia MD  04/18/18  11:14 AM

## 2018-04-18 NOTE — PLAN OF CARE
Problem: Fall Risk (Adult)  Goal: Identify Related Risk Factors and Signs and Symptoms  Outcome: Ongoing (interventions implemented as appropriate)    Goal: Absence of Fall  Outcome: Ongoing (interventions implemented as appropriate)      Problem: Patient Care Overview  Goal: Plan of Care Review  Outcome: Ongoing (interventions implemented as appropriate)

## 2018-04-18 NOTE — PLAN OF CARE
Problem: Fall Risk (Adult)  Goal: Absence of Fall  Outcome: Ongoing (interventions implemented as appropriate)   04/18/18 0134   Fall Risk (Adult)   Absence of Fall making progress toward outcome       Problem: Skin Injury Risk (Adult)  Goal: Skin Health and Integrity  Outcome: Ongoing (interventions implemented as appropriate)   04/18/18 0134   Skin Injury Risk (Adult)   Skin Health and Integrity making progress toward outcome

## 2018-04-19 LAB
ANION GAP SERPL CALCULATED.3IONS-SCNC: 13 MMOL/L (ref 10–20)
BUN BLD-MCNC: 28 MG/DL (ref 7–20)
BUN/CREAT SERPL: 25.5 (ref 7.1–23.5)
CALCIUM SPEC-SCNC: 9.5 MG/DL (ref 8.4–10.2)
CHLORIDE SERPL-SCNC: 107 MMOL/L (ref 98–107)
CO2 SERPL-SCNC: 24 MMOL/L (ref 26–30)
CREAT BLD-MCNC: 1.1 MG/DL (ref 0.6–1.3)
DEPRECATED RDW RBC AUTO: 46.9 FL (ref 37–54)
ERYTHROCYTE [DISTWIDTH] IN BLOOD BY AUTOMATED COUNT: 13.9 % (ref 11.5–14.5)
GFR SERPL CREATININE-BSD FRML MDRD: 47 ML/MIN/1.73
GLUCOSE BLD-MCNC: 44 MG/DL (ref 74–98)
GLUCOSE BLDC GLUCOMTR-MCNC: 234 MG/DL (ref 70–130)
GLUCOSE BLDC GLUCOMTR-MCNC: 311 MG/DL (ref 70–130)
GLUCOSE BLDC GLUCOMTR-MCNC: 490 MG/DL (ref 70–130)
GLUCOSE BLDC GLUCOMTR-MCNC: 55 MG/DL (ref 70–130)
GLUCOSE BLDC GLUCOMTR-MCNC: 57 MG/DL (ref 70–130)
GLUCOSE BLDC GLUCOMTR-MCNC: 94 MG/DL (ref 70–130)
HBA1C MFR BLD: 8.7 % (ref 3–6)
HCT VFR BLD AUTO: 32.5 % (ref 37–47)
HGB BLD-MCNC: 10.4 G/DL (ref 12–16)
MCH RBC QN AUTO: 29.5 PG (ref 27–31)
MCHC RBC AUTO-ENTMCNC: 32 G/DL (ref 30–37)
MCV RBC AUTO: 92.1 FL (ref 81–99)
PLATELET # BLD AUTO: 237 10*3/MM3 (ref 130–400)
PMV BLD AUTO: 10.7 FL (ref 6–12)
POTASSIUM BLD-SCNC: 5 MMOL/L (ref 3.5–5.1)
RBC # BLD AUTO: 3.53 10*6/MM3 (ref 4.2–5.4)
SODIUM BLD-SCNC: 139 MMOL/L (ref 137–145)
WBC NRBC COR # BLD: 8.27 10*3/MM3 (ref 4.8–10.8)

## 2018-04-19 PROCEDURE — 25010000002 FUROSEMIDE PER 20 MG: Performed by: INTERNAL MEDICINE

## 2018-04-19 PROCEDURE — 85027 COMPLETE CBC AUTOMATED: CPT | Performed by: INTERNAL MEDICINE

## 2018-04-19 PROCEDURE — 94799 UNLISTED PULMONARY SVC/PX: CPT

## 2018-04-19 PROCEDURE — 80048 BASIC METABOLIC PNL TOTAL CA: CPT | Performed by: INTERNAL MEDICINE

## 2018-04-19 PROCEDURE — 63710000001 INSULIN ASPART PER 5 UNITS: Performed by: INTERNAL MEDICINE

## 2018-04-19 PROCEDURE — 82962 GLUCOSE BLOOD TEST: CPT

## 2018-04-19 PROCEDURE — 83036 HEMOGLOBIN GLYCOSYLATED A1C: CPT | Performed by: INTERNAL MEDICINE

## 2018-04-19 PROCEDURE — 25010000002 METHYLPREDNISOLONE PER 40 MG: Performed by: INTERNAL MEDICINE

## 2018-04-19 PROCEDURE — 25010000002 ENOXAPARIN PER 10 MG: Performed by: INTERNAL MEDICINE

## 2018-04-19 PROCEDURE — 63710000001 INSULIN DETEMIR PER 5 UNITS: Performed by: INTERNAL MEDICINE

## 2018-04-19 PROCEDURE — 25010000002 CEFEPIME PER 500 MG: Performed by: INTERNAL MEDICINE

## 2018-04-19 PROCEDURE — 25010000002 AZITHROMYCIN: Performed by: INTERNAL MEDICINE

## 2018-04-19 PROCEDURE — 94762 N-INVAS EAR/PLS OXIMTRY CONT: CPT

## 2018-04-19 PROCEDURE — 99233 SBSQ HOSP IP/OBS HIGH 50: CPT | Performed by: INTERNAL MEDICINE

## 2018-04-19 PROCEDURE — 97161 PT EVAL LOW COMPLEX 20 MIN: CPT

## 2018-04-19 RX ORDER — METHYLPREDNISOLONE SODIUM SUCCINATE 40 MG/ML
40 INJECTION, POWDER, LYOPHILIZED, FOR SOLUTION INTRAMUSCULAR; INTRAVENOUS EVERY 12 HOURS
Status: DISCONTINUED | OUTPATIENT
Start: 2018-04-19 | End: 2018-04-20 | Stop reason: HOSPADM

## 2018-04-19 RX ORDER — FUROSEMIDE 10 MG/ML
40 INJECTION INTRAMUSCULAR; INTRAVENOUS ONCE
Status: COMPLETED | OUTPATIENT
Start: 2018-04-19 | End: 2018-04-19

## 2018-04-19 RX ADMIN — ASPIRIN 81 MG: 81 TABLET, COATED ORAL at 08:36

## 2018-04-19 RX ADMIN — INSULIN ASPART 12 UNITS: 100 INJECTION, SOLUTION INTRAVENOUS; SUBCUTANEOUS at 20:35

## 2018-04-19 RX ADMIN — HYDROCHLOROTHIAZIDE 25 MG: 25 TABLET ORAL at 08:30

## 2018-04-19 RX ADMIN — METOPROLOL TARTRATE 50 MG: 50 TABLET ORAL at 20:36

## 2018-04-19 RX ADMIN — BUDESONIDE 0.5 MG: 0.5 INHALANT RESPIRATORY (INHALATION) at 19:41

## 2018-04-19 RX ADMIN — HYDRALAZINE HYDROCHLORIDE 50 MG: 25 TABLET ORAL at 20:37

## 2018-04-19 RX ADMIN — POLYSACCHARIDE-IRON COMPLEX 150 MG: 150 CAPSULE ORAL at 08:30

## 2018-04-19 RX ADMIN — ACETYLCYSTEINE 3 ML: 200 SOLUTION ORAL; RESPIRATORY (INHALATION) at 07:05

## 2018-04-19 RX ADMIN — ATORVASTATIN CALCIUM 10 MG: 10 TABLET, FILM COATED ORAL at 20:36

## 2018-04-19 RX ADMIN — HYDRALAZINE HYDROCHLORIDE 50 MG: 25 TABLET ORAL at 14:26

## 2018-04-19 RX ADMIN — INSULIN ASPART 7 UNITS: 100 INJECTION, SOLUTION INTRAVENOUS; SUBCUTANEOUS at 17:16

## 2018-04-19 RX ADMIN — ENOXAPARIN SODIUM 30 MG: 30 INJECTION SUBCUTANEOUS at 17:25

## 2018-04-19 RX ADMIN — OXYCODONE HYDROCHLORIDE AND ACETAMINOPHEN 1000 MG: 500 TABLET ORAL at 08:30

## 2018-04-19 RX ADMIN — HYDRALAZINE HYDROCHLORIDE 50 MG: 25 TABLET ORAL at 06:34

## 2018-04-19 RX ADMIN — IPRATROPIUM BROMIDE AND ALBUTEROL SULFATE 3 ML: .5; 3 SOLUTION RESPIRATORY (INHALATION) at 07:05

## 2018-04-19 RX ADMIN — MULTIPLE VITAMINS W/ MINERALS TAB 1 TABLET: TAB at 08:29

## 2018-04-19 RX ADMIN — FAMOTIDINE 20 MG: 20 TABLET ORAL at 08:29

## 2018-04-19 RX ADMIN — CEFEPIME HYDROCHLORIDE 1 G: 1 INJECTION, SOLUTION INTRAVENOUS at 12:02

## 2018-04-19 RX ADMIN — INSULIN DETEMIR 30 UNITS: 100 INJECTION, SOLUTION SUBCUTANEOUS at 20:37

## 2018-04-19 RX ADMIN — LEVOTHYROXINE SODIUM 88 MCG: 0.09 TABLET ORAL at 06:34

## 2018-04-19 RX ADMIN — METOPROLOL TARTRATE 50 MG: 50 TABLET ORAL at 08:30

## 2018-04-19 RX ADMIN — METHYLPREDNISOLONE SODIUM SUCCINATE 40 MG: 40 INJECTION, POWDER, FOR SOLUTION INTRAMUSCULAR; INTRAVENOUS at 12:02

## 2018-04-19 RX ADMIN — CYANOCOBALAMIN TAB 500 MCG 1000 MCG: 500 TAB at 08:30

## 2018-04-19 RX ADMIN — AZITHROMYCIN 500 MG: 500 INJECTION, POWDER, LYOPHILIZED, FOR SOLUTION INTRAVENOUS at 14:26

## 2018-04-19 RX ADMIN — DILTIAZEM HYDROCHLORIDE 120 MG: 120 CAPSULE, COATED, EXTENDED RELEASE ORAL at 08:29

## 2018-04-19 RX ADMIN — Medication 10 ML: at 12:02

## 2018-04-19 RX ADMIN — BUDESONIDE 0.5 MG: 0.5 INHALANT RESPIRATORY (INHALATION) at 07:05

## 2018-04-19 RX ADMIN — FUROSEMIDE 40 MG: 10 INJECTION, SOLUTION INTRAMUSCULAR; INTRAVENOUS at 14:25

## 2018-04-19 RX ADMIN — INSULIN ASPART 4 UNITS: 100 INJECTION, SOLUTION INTRAVENOUS; SUBCUTANEOUS at 12:03

## 2018-04-19 RX ADMIN — ACETYLCYSTEINE 3 ML: 200 SOLUTION ORAL; RESPIRATORY (INHALATION) at 19:41

## 2018-04-19 RX ADMIN — IPRATROPIUM BROMIDE AND ALBUTEROL SULFATE 3 ML: .5; 3 SOLUTION RESPIRATORY (INHALATION) at 13:02

## 2018-04-19 RX ADMIN — Medication 10 ML: at 14:25

## 2018-04-19 RX ADMIN — Medication 2 TABLET: at 20:36

## 2018-04-19 RX ADMIN — IPRATROPIUM BROMIDE AND ALBUTEROL SULFATE 3 ML: .5; 3 SOLUTION RESPIRATORY (INHALATION) at 19:41

## 2018-04-19 RX ADMIN — CHOLECALCIFEROL CAP 125 MCG (5000 UNIT) 5000 UNITS: 125 CAP at 08:30

## 2018-04-19 NOTE — PLAN OF CARE
Problem: Fall Risk (Adult)  Goal: Identify Related Risk Factors and Signs and Symptoms  Outcome: Ongoing (interventions implemented as appropriate)    Goal: Absence of Fall  Outcome: Ongoing (interventions implemented as appropriate)      Problem: Patient Care Overview  Goal: Plan of Care Review  Outcome: Ongoing (interventions implemented as appropriate)   04/19/18 6819   Coping/Psychosocial   Plan of Care Reviewed With patient;family   Plan of Care Review   Progress improving   OTHER   Outcome Summary pt complaint of feeling SOA overnight and unable to sleep, PRN Neb given, pt lung sounds diminished with little movement and expiratory wheezes     Goal: Individualization and Mutuality  Outcome: Ongoing (interventions implemented as appropriate)    Goal: Discharge Needs Assessment  Outcome: Ongoing (interventions implemented as appropriate)    Goal: Interprofessional Rounds/Family Conf  Outcome: Ongoing (interventions implemented as appropriate)      Problem: Skin Injury Risk (Adult)  Goal: Identify Related Risk Factors and Signs and Symptoms  Outcome: Ongoing (interventions implemented as appropriate)    Goal: Skin Health and Integrity  Outcome: Ongoing (interventions implemented as appropriate)

## 2018-04-19 NOTE — PLAN OF CARE
Problem: Fall Risk (Adult)  Goal: Absence of Fall  Outcome: Ongoing (interventions implemented as appropriate)      Problem: Patient Care Overview  Goal: Plan of Care Review  Outcome: Ongoing (interventions implemented as appropriate)   04/19/18 1055   Coping/Psychosocial   Plan of Care Reviewed With patient;daughter   Plan of Care Review   Progress improving       Problem: Skin Injury Risk (Adult)  Goal: Identify Related Risk Factors and Signs and Symptoms  Outcome: Ongoing (interventions implemented as appropriate)    Goal: Skin Health and Integrity  Outcome: Ongoing (interventions implemented as appropriate)

## 2018-04-19 NOTE — PROGRESS NOTES
"Hospitalist Progress Note.    LOS: 3 days    Patient Care Team:  ASAD Lo as PCP - General (Family Medicine)    Chief Complaint:    Shortness of breath and cough    Subjective   Patient seen and examined this morning. She reports some improvement in her cough and feels less short of breath. She however was not able to sleep well through the night due to coughing spells. She has not gotten out of bed since admission other than to use bedside commode. She denies any fever or chills. Denies any chest pain or palpitations.   I did decrease her Levemir dose to 45 units yesterday, despite this her bmp glucose of 44 this morning again.  Her blood pressure appears improved since addition of hydralazine.   This morning she is on 4L of oxygen with oxygen saturation of 99%.     Review of Systems:    The pertinent  ROS was done and it is noted above, rest  was negative.    Objective     Vital Signs  /54 (BP Location: Right arm, Patient Position: Lying)   Pulse 76   Temp 98.5 °F (36.9 °C) (Oral)   Resp 18   Ht 152.4 cm (60\")   Wt 64.5 kg (142 lb 4 oz)   SpO2 99%   BMI 27.78 kg/m²       I/O this shift:  In: 50 [P.O.:50]  Out: -     Intake/Output Summary (Last 24 hours) at 04/19/18 1108  Last data filed at 04/19/18 0900   Gross per 24 hour   Intake              650 ml   Output              900 ml   Net             -250 ml       Physical Exam:    General Appearance: alert, oriented x 3, no acute distress  HEENT: pupils round and reactive to light, oral mucosa dry, extra occular movements intact.  Neck: supple, no JVD, trachea midline  Lungs: Fair air entry bilaterally, no wheezing appreciated today, there is bilateral rhonchi   Heart: RRR, normal S1 and S2, no S3, no rub  Abdomen: soft, non-tender, no palpable bladder, present bowel sounds to auscultation  Extremities: no edema, cyanosis or clubbing.   Neuro: normal speech and mental status, grossly non focal.     Results Review:      Results from last 7 " days  Lab Units 04/19/18  0602 04/18/18  0520 04/17/18  0450 04/16/18  1243   SODIUM mmol/L 139 144 143 141   POTASSIUM mmol/L 5.0 4.4 5.2* 6.4*   CHLORIDE mmol/L 107 108* 112* 105   CO2 mmol/L 24.0* 26.0 23.0* 24.0*   BUN mg/dL 28* 33* 46* 60*   CREATININE mg/dL 1.10 1.30 1.20 1.60*   CALCIUM mg/dL 9.5 9.6 9.6 10.5*   BILIRUBIN mg/dL  --   --  0.3 0.5   ALK PHOS U/L  --   --  77 129*   ALT (SGPT) U/L  --   --  44 54   AST (SGOT) U/L  --   --  38 64*   GLUCOSE mg/dL 44* 67* 249* 347*       Estimated Creatinine Clearance: 30.2 mL/min (by C-G formula based on SCr of 1.1 mg/dL).                  Results from last 7 days  Lab Units 04/19/18  0602 04/18/18  0520 04/17/18 0450 04/16/18  1243   WBC 10*3/mm3 8.27 9.69 9.57 22.62*   HEMOGLOBIN g/dL 10.4* 10.2* 10.0* 12.8   PLATELETS 10*3/mm3 237 253 245 408*               Imaging Results (last 24 hours)     ** No results found for the last 24 hours. **          acetylcysteine 3 mL Nebulization BID - RT   aspirin 81 mg Oral Daily   atorvastatin 10 mg Oral Nightly   azithromycin 500 mg Intravenous Q24H   budesonide 0.5 mg Nebulization BID - RT   cefepime 1 g Intravenous Q24H   diltiaZEM  mg Oral Daily   enoxaparin 30 mg Subcutaneous Q24H   famotidine 20 mg Oral Daily   hydrALAZINE 50 mg Oral Q8H   Hydrochlorothiazide Oral 25 mg Oral Daily   insulin aspart 0-9 Units Subcutaneous 4x Daily AC & at Bedtime   insulin detemir 45 Units Subcutaneous Nightly   ipratropium-albuterol 3 mL Nebulization 4x Daily - RT   iron polysaccharides 150 mg Oral Daily   levothyroxine 88 mcg Oral Q AM   metoprolol tartrate 50 mg Oral Q12H   multivitamin with minerals 1 tablet Oral Daily   sennosides-docusate sodium 2 tablet Oral Nightly   vitamin B-12 1,000 mcg Oral Once per day on Sun Tue Thu Sat   vitamin C 1,000 mg Oral Daily   vitamin D3 5,000 Units Oral Daily          Medication Review:   Current Facility-Administered Medications   Medication Dose Route Frequency Provider Last Rate Last  Dose   • acetaminophen (TYLENOL) tablet 650 mg  650 mg Oral Q4H PRN Elvira Garcia MD   650 mg at 04/17/18 2312   • acetylcysteine (MUCOMYST) 20 % solution 3 mL  3 mL Nebulization BID - RT Elvira Garcia MD   3 mL at 04/19/18 0705   • aspirin EC tablet 81 mg  81 mg Oral Daily Elvira Garcia MD   81 mg at 04/19/18 0836   • atorvastatin (LIPITOR) tablet 10 mg  10 mg Oral Nightly Elvira Garcia MD   10 mg at 04/18/18 2109   • AZITHROMYCIN 500 MG/250 ML 0.9% NS IVPB (vial-mate)  500 mg Intravenous Q24H Elvira Garcia MD   500 mg at 04/18/18 1507   • budesonide (PULMICORT) nebulizer solution 0.5 mg  0.5 mg Nebulization BID - RT Elvira Garcia MD   0.5 mg at 04/19/18 0705   • cefepime (MAXIPIME) IVPB 1 g/50ml D5W (premix)  1 g Intravenous Q24H Elvira Garcia MD       • dextrose (D50W) solution 25 g  25 g Intravenous Q15 Min PRN Elvira Garcia MD       • dextrose (D50W) solution 50 mL  50 mL Intravenous Q1H PRN AKBAR Matos   50 mL at 04/16/18 1443   • dextrose (GLUTOSE) oral gel 1 tube  1 tube Oral Q15 Min PRN Elvira Garcia MD       • diltiaZEM CD (CARDIZEM CD) 24 hr capsule 120 mg  120 mg Oral Daily Elvira Garcia MD   120 mg at 04/19/18 0829   • enoxaparin (LOVENOX) syringe 30 mg  30 mg Subcutaneous Q24H Elvira Garcia MD   30 mg at 04/18/18 1722   • famotidine (PEPCID) tablet 20 mg  20 mg Oral Daily Elvira Garcia MD   20 mg at 04/19/18 0829   • glucagon (human recombinant) (GLUCAGEN DIAGNOSTIC) injection 1 mg  1 mg Subcutaneous PRN Elvira Garcia MD       • hydrALAZINE (APRESOLINE) injection 20 mg  20 mg Intravenous Q6H PRN Elvira Garcia MD   20 mg at 04/17/18 1250   • hydrALAZINE (APRESOLINE) tablet 50 mg  50 mg Oral Q8H Elvira Garcia MD   50 mg at 04/19/18 0634   • hydrochlorothiazide (HYDRODIURIL) tablet 25 mg  25 mg Oral Daily Elvira Garcia MD   25 mg at 04/19/18 0830   • insulin aspart (novoLOG) injection 0-9 Units  0-9 Units Subcutaneous 4x Daily AC & at  Bedtime Elvira Garcia MD   2 Units at 04/18/18 2108   • insulin detemir (LEVEMIR) injection 45 Units  45 Units Subcutaneous Nightly Elvira Garcia MD   45 Units at 04/18/18 2107   • ipratropium-albuterol (DUO-NEB) nebulizer solution 3 mL  3 mL Nebulization Q4H PRN Elvira Garcia MD   3 mL at 04/18/18 0722   • ipratropium-albuterol (DUO-NEB) nebulizer solution 3 mL  3 mL Nebulization 4x Daily - RT Elvira Garcia MD   3 mL at 04/19/18 0705   • iron polysaccharides (NIFEREX) capsule 150 mg  150 mg Oral Daily Elvira Garcia MD   150 mg at 04/19/18 0830   • levothyroxine (SYNTHROID, LEVOTHROID) tablet 88 mcg  88 mcg Oral Q AM Elvira Garcia MD   88 mcg at 04/19/18 0634   • metoprolol tartrate (LOPRESSOR) tablet 50 mg  50 mg Oral Q12H Kaylah Corona DO   50 mg at 04/19/18 0830   • multivitamin with minerals 1 tablet  1 tablet Oral Daily Elvira Garcia MD   1 tablet at 04/19/18 0829   • nitroglycerin (NITROSTAT) ointment 1 inch  1 inch Topical Q6H PRN Elvira Garcia MD       • ondansetron (ZOFRAN) tablet 4 mg  4 mg Oral Q6H PRN Elvira Garcia MD        Or   • ondansetron ODT (ZOFRAN-ODT) disintegrating tablet 4 mg  4 mg Oral Q6H PRN Elvira Garcia MD        Or   • ondansetron (ZOFRAN) injection 4 mg  4 mg Intravenous Q6H PRN Elvira Garcia MD       • sennosides-docusate sodium (SENOKOT-S) 8.6-50 MG tablet 2 tablet  2 tablet Oral Nightly Elvira Garcia MD   2 tablet at 04/18/18 2109   • sodium chloride 0.9 % flush 1-10 mL  1-10 mL Intravenous PRN Elvira Garcia MD       • sodium chloride 0.9 % flush 10 mL  10 mL Intravenous PRN Benny Teran MD       • vitamin B-12 (CYANOCOBALAMIN) tablet 1,000 mcg  1,000 mcg Oral Once per day on Sun Tue Thu Sat Elvira Garcia MD   1,000 mcg at 04/19/18 0830   • vitamin C (ASCORBIC ACID) tablet 1,000 mg  1,000 mg Oral Daily Elvira Garcia MD   1,000 mg at 04/19/18 0830   • vitamin D3 capsule 5,000 Units  5,000 Units Oral Daily Elvira Garcia MD    5,000 Units at 04/19/18 0830     Active Problems:    Pneumonia of lower lobe due to infectious organism    Assessment/Plan   1. Sepsis due to # 2, resolved  2. Left lower lobe pneumonia, worsening  3. Acute hyperkalemia, possibly due to underlying CKD with medication side effect - ACE-I  4. Hyperglycemia on admission  5. Elevated troponin level, likely supply demand mismatch, type 2 MI, echo shows preserved EF with diastolic changes  6. CKD stage III  7. History of atrial fibrillation, not on anticoagulation, in NSR currently   8. History of coronary artery disease s/p stent   9. Hypertensive urgency, present on admission  10. Hyperlipidemia  11. Hearing impairment   12. Episodes of hypoglycemia  13. Type 2 diabetes mellitus     Patient is making slow progress clinically. She probably needs atleast 1 more day of IV antibiotics. I have added mucomyst yesterday and she has been expectorating more. Will continue to titrate down her oxygen requirement. Patient does have some emphysematous changes on her CXR, so I will add IV Solumedrol 40 mg twice daily and see if that helps.    I have decreased her Levmier to 30 units for night.   Will f/u sputum culture.   Holding Triamterene and Quinapril due to hyperkalemia on admission.   PT consultation ordered. I have encouraged ambulation. Patient is amenable to short term rehabilitation if deemed appropriate.     Details were discussed with the patient and family member at the bedside.   I also discussed the details with the nursing staff.    Rest as ordered.    Elvira Garcia MD  04/19/18  11:08 AM

## 2018-04-19 NOTE — THERAPY EVALUATION
Acute Care - Physical Therapy Initial Evaluation  [facility]     Patient Name: Ninoska Gloria  : 1931  MRN: [id number]  Today's Date: 2018   Onset of Illness/Injury or Date of Surgery: 18  Date of Referral to PT: 18  Referring Physician: Jose      Admit Date: 2018    Visit Dx:     ICD-10-CM ICD-9-CM   1. Pneumonia of lower lobe due to infectious organism, unspecified laterality J18.1 483.8   2. Hyperkalemia E87.5 276.7   3. Elevated troponin R74.8 790.6   4. Elevated brain natriuretic peptide (BNP) level R79.89 790.99   5. Impaired functional mobility, balance, gait, and endurance Z74.09 V49.89     Patient Active Problem List   Diagnosis   • Pneumonia of lower lobe due to infectious organism     Past Medical History:   Diagnosis Date   • Diabetes mellitus    • Hyperlipidemia    • Hypertension    • Myocardial infarction    • Renal failure      Past Surgical History:   Procedure Laterality Date   • CATARACT EXTRACTION     •  SECTION          PT ASSESSMENT (last 12 hours)      Physical Therapy Evaluation     Row Name 18 1444          PT Evaluation Time/Intention    Subjective Information no complaints  -LM     Document Type evaluation  -LM     Mode of Treatment physical therapy  -LM     Patient Effort good  -LM     Row Name 18 1444          General Information    Patient Profile Reviewed? yes  -LM     Onset of Illness/Injury or Date of Surgery 18  -LM     Referring Physician Jose  -     Patient Observations alert;cooperative;agree to therapy  -LM     Patient/Family Observations Daughters present at bedside.  -LM     General Observations of Patient Pt received L sidelying in bed. O2 per n/c @4 LPM.  -LM     Prior Level of Function independent:;community mobility   with cane  -LM     Equipment Currently Used at Home cane, straight;commode, bedside  -LM     Pertinent History of Current Functional Problem Pneumonia/sepsis;CAD,afib,HTN,DM  -LM     Existing  Precautions/Restrictions fall;oxygen therapy device and L/min  -LM     Risks Reviewed patient and family:;increased discomfort  -LM     Benefits Reviewed patient and family:;improve function;increase independence  -LM     Row Name 04/19/18 1444          Relationship/Environment    Lives With alone  -LM     Row Name 04/19/18 1444          Resource/Environmental Concerns    Current Living Arrangements home/apartment/condo  -LM     Row Name 04/19/18 1444          Home Main Entrance    Number of Stairs, Main Entrance four  -LM     Stair Railings, Main Entrance railing on right side (ascending)  -LM     Row Name 04/19/18 1444          Cognitive Assessment/Intervention- PT/OT    Orientation Status (Cognition) oriented x 4  -LM     Follows Commands (Cognition) WFL  -LM     Row Name 04/19/18 1444          Bed Mobility Assessment/Treatment    Bed Mobility Assessment/Treatment supine-sit  -LM     Supine-Sit [place] (Bed Mobility) conditional independence  -LM     Assistive Device (Bed Mobility) bed rails;head of bed elevated  -LM     Row Name 04/19/18 1444          Transfer Assessment/Treatment    Transfer Assessment/Treatment sit-stand transfer;stand-sit transfer;bed-chair transfer  -LM     Bed-Chair [place] (Transfers) contact guard  -LM     Assistive Device (Bed-Chair Transfers) walker, front-wheeled  -LM     Sit-Stand [place] (Transfers) contact guard  -LM     Stand-Sit [place] (Transfers) contact guard  -LM     Row Name 04/19/18 1444          Sit-Stand Transfer    Assistive Device (Sit-Stand Transfers) walker, front-wheeled  -LM     Row Name 04/19/18 1444          Stand-Sit Transfer    Assistive Device (Stand-Sit Transfers) walker, front-wheeled  -LM     Row Name 04/19/18 1444          Gait/Stairs Assessment/Training    Gait/Stairs Assessment/Training gait/ambulation assistive device  -LM     [place] Level (Gait) contact guard  -LM     Assistive Device (Gait) walker, front-wheeled  -LM      Distance in Feet (Gait) 272  -LM     Row Name 04/19/18 1444          General ROM    GENERAL ROM COMMENTS Grossly WFL.  -LM     Row Name 04/19/18 1444          General Assessment (Manual Muscle Testing)    Comment, General Manual Muscle Testing (MMT) Assessment Grossly WFL.  -LM     Row Name 04/19/18 1444          Pain Assessment    Additional Documentation Pain Scale: Numbers Pre/Post-Treatment (Group)  -LM     Row Name 04/19/18 1444          Pain Scale: Numbers Pre/Post-Treatment    Pain Scale: Numbers, Pretreatment 0/10 - no pain  -LM     Pain Scale: Numbers, Post-Treatment 0/10 - no pain  -LM     Row Name 04/19/18 1444          Coping    Observed Emotional State calm;cooperative  -LM     Verbalized Emotional State acceptance  -LM     Row Name 04/19/18 1444          Plan of Care Review    Plan of Care Reviewed With patient;family  -LM     Row Name 04/19/18 1444          Physical Therapy Clinical Impression    Date of Referral to PT 04/19/18  -LM     PT Diagnosis (PT Clinical Impression) Generalized weakness  -LM     Patient/Family Goals Statement (PT Clinical Impression) Return home.  -LM     Criteria for Skilled Interventions Met (PT Clinical Impression) yes;treatment indicated  -LM     Rehab Potential (PT Clinical Summary) good, to achieve stated therapy goals  -LM     Care Plan Review (PT) evaluation/treatment results reviewed;care plan/treatment goals reviewed;risks/benefits reviewed;current/potential barriers reviewed;patient/other agree to care plan  -LM     Row Name 04/19/18 1444          Vital Signs    Pre SpO2 (%) 96  -LM     O2 Delivery Pre Treatment supplemental O2   4 LPM  -LM     Post SpO2 (%) 96  -LM     O2 Delivery Post Treatment supplemental O2   4 LPM  -LM     Row Name 04/19/18 1444          Physical Therapy Goals    Transfer Goal Selection (PT) transfer, PT goal 1  -LM     Gait Training Goal Selection (PT) gait training, PT goal 1  -LM     Row Name 04/19/18 1444          Transfer Goal 1 (PT)     Activity/Assistive Device (Transfer Goal 1, PT) sit-to-stand/stand-to-sit;bed-to-chair/chair-to-bed;walker, rolling  -LM     Bee Level/Cues Needed (Transfer Goal 1, PT) standby assist  -LM     Time Frame (Transfer Goal 1, PT) 2 weeks  -LM     Progress/Outcome (Transfer Goal 1, PT) goal ongoing  -LM     Row Name 04/19/18 1444          Gait Training Goal 1 (PT)    Activity/Assistive Device (Gait Training Goal 1, PT) gait (walking locomotion);assistive device use;walker, rolling  -LM     Bee Level (Gait Training Goal 1, PT) standby assist  -LM     Distance (Gait Goal 1, PT) 400  -LM     Time Frame (Gait Training Goal 1, PT) 2 weeks  -LM     Progress/Outcome (Gait Training Goal 1, PT) goal ongoing  -LM     Row Name 04/19/18 1444          Patient Education Goal (PT)    Activity (Patient Education Goal, PT) Pt will be independent with HEP.  -LM     Bee/Cues/Accuracy (Memory Goal 2, PT) demonstrates adequately;independent;verbalizes understanding  -LM     Time Frame (Patient Education Goal, PT) 2 weeks  -LM     Progress/Outcome (Patient Education Goal, PT) goal ongoing  -LM     Row Name 04/19/18 1444          Positioning and Restraints    Pre-Treatment Position in bed  -LM     Post Treatment Position chair  -LM     In Chair sitting;call light within reach;encouraged to call for assist;with family/caregiver  -LM     Row Name 04/19/18 1444          Living Environment    Home Accessibility stairs to enter home  -LM       User Key  (r) = Recorded By, (t) = Taken By, (c) = Cosigned By    Initials Name Provider Type    LM Wandy Christopher, PT Physical Therapist          Physical Therapy Education     Title: PT OT SLP Therapies (Done)     Topic: Physical Therapy (Done)     Point: Mobility training (Done)    Learning Progress Summary     Learner Status Readiness Method Response Comment Documented by    Patient Done Acceptance E VU Purpose of PT/POC.  Proper use of RW for safe transfers/ambulation. LM  04/19/18 1602    Family Done Acceptance E VU Purpose of PT/POC.  Proper use of RW for safe transfers/ambulation.  04/19/18 1602          Point: Home exercise program (Done)    Learning Progress Summary     Learner Status Readiness Method Response Comment Documented by    Patient Done Acceptance E VU Purpose of PT/POC.  Proper use of RW for safe transfers/ambulation.  04/19/18 1602    Family Done Acceptance E VU Purpose of PT/POC.  Proper use of RW for safe transfers/ambulation.  04/19/18 1602          Point: Precautions (Done)    Learning Progress Summary     Learner Status Readiness Method Response Comment Documented by    Patient Done Acceptance E VU Purpose of PT/POC.  Proper use of RW for safe transfers/ambulation.  04/19/18 1602    Family Done Acceptance E VU Purpose of PT/POC.  Proper use of RW for safe transfers/ambulation.  04/19/18 1602                      User Key     Initials Effective Dates Name Provider Type Discipline     04/03/18 -  Wandy Christopher, PT Physical Therapist PT                PT Recommendation and Plan  Anticipated Discharge Disposition (PT): home with home health care  Planned Therapy Interventions (PT Eval): balance training, gait training, home exercise program, patient/family education, strengthening, transfer training  Therapy Frequency (PT Clinical Impression): daily  Outcome Summary/Treatment Plan (PT)  Anticipated Discharge Disposition (PT): home with home health care  Plan of Care Reviewed With: patient, family  Outcome Summary: PT eval completed.  Patient presents with decreased strength, balance, aerobic capacity and independence with mobility but is expected to benefit from continued skilled PT intervention to improve her functional mobility status prior to D/C.          Outcome Measures     Row Name 04/19/18 1448             How much help from another person do you currently need...    Turning from your back to your side while in flat bed without using bedrails? 4   -LM      Moving from lying on back to sitting on the side of a flat bed without bedrails? 4  -LM      Moving to and from a bed to a chair (including a wheelchair)? 3  -LM      Standing up from a chair using your arms (e.g., wheelchair, bedside chair)? 3  -LM      Climbing 3-5 steps with a railing? 3  -LM      To walk in hospital room? 3  -LM      AM-PAC 6 Clicks Score 20  -LM         Functional Assessment    Outcome Measure Options AM-PAC 6 Clicks Basic Mobility (PT)  -LM        User Key  (r) = Recorded By, (t) = Taken By, (c) = Cosigned By    Initials Name Provider Type    LM Wandy Christopher PT Physical Therapist           Time Calculation:         PT Charges     Row Name 04/19/18 1604             Time Calculation    Start Time 1444  -LM      PT Received On 04/19/18  -LM      PT Goal Re-Cert Due Date 04/29/18  -LM        User Key  (r) = Recorded By, (t) = Taken By, (c) = Cosigned By    Initials Name Provider Type    LM Wandy Christopher PT Physical Therapist          Therapy Charges for Today     Code Description Service Date Service Provider Modifiers Qty    10408523012 HC PT EVAL LOW COMPLEXITY 4 4/19/2018 Wandy Christopher, PT GP 1          PT G-Codes  Outcome Measure Options: AM-PAC 6 Clicks Basic Mobility (PT)      Wandy Christopher PT  4/19/2018

## 2018-04-19 NOTE — PROGRESS NOTES
Discharge Planning Assessment  [facility]     Patient Name: Ninoska Gloria  MRN: [id number]  Today's Date: 4/19/2018    Admit Date: 4/16/2018          Discharge Needs Assessment    No documentation.             Discharge Plan     Row Name 04/19/18 1106       Plan    Plan Spoke to daughter Ira as patient was sleeping.  Awaiting PT eval before decision made for home vs rehab but Ira advises the plan is to return home with home health most likely.  Home health choice is [facility]. If rehab is needed, would like [facility] care and rehab.   will continue to follow.     Patient/Family in Agreement with Plan yes        Destination     No service coordination in this encounter.      Durable Medical Equipment     No service coordination in this encounter.      Dialysis/Infusion     No service coordination in this encounter.      Home Medical Care     No service coordination in this encounter.      Social Care     No service coordination in this encounter.        Expected Discharge Date and Time     Expected Discharge Date Expected Discharge Time    Apr 19, 2018               Demographic Summary    No documentation.           Functional Status    No documentation.           Psychosocial    No documentation.           Abuse/Neglect    No documentation.           Legal    No documentation.           Substance Abuse    No documentation.           Patient Forms    No documentation.         Rosanna Layne

## 2018-04-19 NOTE — PLAN OF CARE
Problem: Pneumonia (Adult)  Goal: Signs and Symptoms of Listed Potential Problems Will be Absent, Minimized or Managed (Pneumonia)  Outcome: Ongoing (interventions implemented as appropriate)   04/19/18 1055   Goal/Outcome Evaluation   Problems Assessed (Pneumonia) all   Problems Present (Pneumonia) respiratory compromise;infection progression

## 2018-04-19 NOTE — PLAN OF CARE
Problem: Patient Care Overview  Goal: Plan of Care Review  Outcome: Ongoing (interventions implemented as appropriate)   04/19/18 8735   Coping/Psychosocial   Plan of Care Reviewed With patient;family   OTHER   Outcome Summary PT eval completed. Patient presents with decreased strength, balance, aerobic capacity and independence with mobility but is expected to benefit from continued skilled PT intervention to improve her functional mobility status prior to D/C.

## 2018-04-20 VITALS
HEART RATE: 87 BPM | TEMPERATURE: 98.1 F | WEIGHT: 142.9 LBS | RESPIRATION RATE: 16 BRPM | SYSTOLIC BLOOD PRESSURE: 143 MMHG | DIASTOLIC BLOOD PRESSURE: 82 MMHG | BODY MASS INDEX: 28.06 KG/M2 | OXYGEN SATURATION: 93 % | HEIGHT: 60 IN

## 2018-04-20 LAB
ANION GAP SERPL CALCULATED.3IONS-SCNC: 15.3 MMOL/L (ref 10–20)
BUN BLD-MCNC: 41 MG/DL (ref 7–20)
BUN/CREAT SERPL: 34.2 (ref 7.1–23.5)
CALCIUM SPEC-SCNC: 9.4 MG/DL (ref 8.4–10.2)
CHLORIDE SERPL-SCNC: 101 MMOL/L (ref 98–107)
CO2 SERPL-SCNC: 24 MMOL/L (ref 26–30)
CREAT BLD-MCNC: 1.2 MG/DL (ref 0.6–1.3)
DEPRECATED RDW RBC AUTO: 47.1 FL (ref 37–54)
ERYTHROCYTE [DISTWIDTH] IN BLOOD BY AUTOMATED COUNT: 13.7 % (ref 11.5–14.5)
GFR SERPL CREATININE-BSD FRML MDRD: 42 ML/MIN/1.73
GLUCOSE BLD-MCNC: 306 MG/DL (ref 74–98)
GLUCOSE BLDC GLUCOMTR-MCNC: 260 MG/DL (ref 70–130)
GLUCOSE BLDC GLUCOMTR-MCNC: 322 MG/DL (ref 70–130)
GLUCOSE BLDC GLUCOMTR-MCNC: 399 MG/DL (ref 70–130)
GLUCOSE BLDC GLUCOMTR-MCNC: 431 MG/DL (ref 70–130)
GLUCOSE BLDC GLUCOMTR-MCNC: 461 MG/DL (ref 70–130)
HCT VFR BLD AUTO: 32.2 % (ref 37–47)
HGB BLD-MCNC: 10 G/DL (ref 12–16)
MCH RBC QN AUTO: 29.2 PG (ref 27–31)
MCHC RBC AUTO-ENTMCNC: 31.1 G/DL (ref 30–37)
MCV RBC AUTO: 93.9 FL (ref 81–99)
NT-PROBNP SERPL-MCNC: 4940 PG/ML (ref 0–450)
PLATELET # BLD AUTO: 194 10*3/MM3 (ref 130–400)
PMV BLD AUTO: 10.7 FL (ref 6–12)
POTASSIUM BLD-SCNC: 5.3 MMOL/L (ref 3.5–5.1)
RBC # BLD AUTO: 3.43 10*6/MM3 (ref 4.2–5.4)
SODIUM BLD-SCNC: 135 MMOL/L (ref 137–145)
WBC NRBC COR # BLD: 5.15 10*3/MM3 (ref 4.8–10.8)

## 2018-04-20 PROCEDURE — 94799 UNLISTED PULMONARY SVC/PX: CPT

## 2018-04-20 PROCEDURE — 80048 BASIC METABOLIC PNL TOTAL CA: CPT | Performed by: INTERNAL MEDICINE

## 2018-04-20 PROCEDURE — 85027 COMPLETE CBC AUTOMATED: CPT | Performed by: INTERNAL MEDICINE

## 2018-04-20 PROCEDURE — 25010000002 METHYLPREDNISOLONE PER 40 MG: Performed by: INTERNAL MEDICINE

## 2018-04-20 PROCEDURE — 25010000002 FUROSEMIDE PER 20 MG: Performed by: INTERNAL MEDICINE

## 2018-04-20 PROCEDURE — 82962 GLUCOSE BLOOD TEST: CPT

## 2018-04-20 PROCEDURE — 99239 HOSP IP/OBS DSCHRG MGMT >30: CPT | Performed by: INTERNAL MEDICINE

## 2018-04-20 PROCEDURE — 63710000001 INSULIN ASPART PER 5 UNITS: Performed by: INTERNAL MEDICINE

## 2018-04-20 PROCEDURE — 83880 ASSAY OF NATRIURETIC PEPTIDE: CPT | Performed by: INTERNAL MEDICINE

## 2018-04-20 RX ORDER — DILTIAZEM HYDROCHLORIDE 120 MG/1
120 CAPSULE, COATED, EXTENDED RELEASE ORAL DAILY
Qty: 30 CAPSULE | Refills: 0 | Status: SHIPPED | OUTPATIENT
Start: 2018-04-21 | End: 2020-10-01

## 2018-04-20 RX ORDER — HYDROCHLOROTHIAZIDE 25 MG/1
25 TABLET ORAL DAILY
Qty: 30 TABLET | Refills: 0 | Status: SHIPPED | OUTPATIENT
Start: 2018-04-21 | End: 2018-11-26

## 2018-04-20 RX ORDER — FUROSEMIDE 40 MG/1
40 TABLET ORAL DAILY
Qty: 3 TABLET | Refills: 0 | Status: SHIPPED | OUTPATIENT
Start: 2018-04-20 | End: 2018-05-24 | Stop reason: ALTCHOICE

## 2018-04-20 RX ORDER — FUROSEMIDE 10 MG/ML
40 INJECTION INTRAMUSCULAR; INTRAVENOUS ONCE
Status: COMPLETED | OUTPATIENT
Start: 2018-04-20 | End: 2018-04-20

## 2018-04-20 RX ORDER — PREDNISONE 20 MG/1
20 TABLET ORAL DAILY
Qty: 3 TABLET | Refills: 0 | Status: SHIPPED | OUTPATIENT
Start: 2018-04-20 | End: 2018-05-24 | Stop reason: ALTCHOICE

## 2018-04-20 RX ORDER — CEFDINIR 300 MG/1
300 CAPSULE ORAL
Qty: 3 CAPSULE | Refills: 0 | Status: SHIPPED | OUTPATIENT
Start: 2018-04-20 | End: 2018-04-23

## 2018-04-20 RX ORDER — HYDRALAZINE HYDROCHLORIDE 50 MG/1
50 TABLET, FILM COATED ORAL EVERY 8 HOURS SCHEDULED
Qty: 90 TABLET | Refills: 0 | Status: SHIPPED | OUTPATIENT
Start: 2018-04-20 | End: 2018-11-26

## 2018-04-20 RX ORDER — METOPROLOL TARTRATE 50 MG/1
50 TABLET, FILM COATED ORAL EVERY 12 HOURS SCHEDULED
Qty: 60 TABLET | Refills: 0 | Status: SHIPPED | OUTPATIENT
Start: 2018-04-20 | End: 2019-12-02

## 2018-04-20 RX ORDER — ALBUTEROL SULFATE 90 UG/1
2 AEROSOL, METERED RESPIRATORY (INHALATION) EVERY 4 HOURS PRN
Qty: 18 G | Refills: 0 | Status: SHIPPED | OUTPATIENT
Start: 2018-04-20 | End: 2018-11-26

## 2018-04-20 RX ORDER — CEFDINIR 300 MG/1
300 CAPSULE ORAL
Status: DISCONTINUED | OUTPATIENT
Start: 2018-04-20 | End: 2018-04-20 | Stop reason: HOSPADM

## 2018-04-20 RX ADMIN — FUROSEMIDE 40 MG: 10 INJECTION, SOLUTION INTRAMUSCULAR; INTRAVENOUS at 09:14

## 2018-04-20 RX ADMIN — LEVOTHYROXINE SODIUM 88 MCG: 0.09 TABLET ORAL at 06:33

## 2018-04-20 RX ADMIN — MULTIPLE VITAMINS W/ MINERALS TAB 1 TABLET: TAB at 09:14

## 2018-04-20 RX ADMIN — INSULIN ASPART 7 UNITS: 100 INJECTION, SOLUTION INTRAVENOUS; SUBCUTANEOUS at 06:33

## 2018-04-20 RX ADMIN — FAMOTIDINE 20 MG: 20 TABLET ORAL at 09:14

## 2018-04-20 RX ADMIN — HYDRALAZINE HYDROCHLORIDE 50 MG: 25 TABLET ORAL at 06:33

## 2018-04-20 RX ADMIN — ASPIRIN 81 MG: 81 TABLET, COATED ORAL at 09:14

## 2018-04-20 RX ADMIN — POLYSACCHARIDE-IRON COMPLEX 150 MG: 150 CAPSULE ORAL at 09:14

## 2018-04-20 RX ADMIN — BUDESONIDE 0.5 MG: 0.5 INHALANT RESPIRATORY (INHALATION) at 06:57

## 2018-04-20 RX ADMIN — HYDROCHLOROTHIAZIDE 25 MG: 25 TABLET ORAL at 09:14

## 2018-04-20 RX ADMIN — INSULIN ASPART 12 UNITS: 100 INJECTION, SOLUTION INTRAVENOUS; SUBCUTANEOUS at 11:50

## 2018-04-20 RX ADMIN — CEFDINIR 300 MG: 300 CAPSULE ORAL at 11:49

## 2018-04-20 RX ADMIN — METOPROLOL TARTRATE 50 MG: 50 TABLET ORAL at 09:14

## 2018-04-20 RX ADMIN — ACETYLCYSTEINE 3 ML: 200 SOLUTION ORAL; RESPIRATORY (INHALATION) at 06:57

## 2018-04-20 RX ADMIN — CHOLECALCIFEROL CAP 125 MCG (5000 UNIT) 5000 UNITS: 125 CAP at 09:14

## 2018-04-20 RX ADMIN — OXYCODONE HYDROCHLORIDE AND ACETAMINOPHEN 1000 MG: 500 TABLET ORAL at 09:13

## 2018-04-20 RX ADMIN — METHYLPREDNISOLONE SODIUM SUCCINATE 40 MG: 40 INJECTION, POWDER, FOR SOLUTION INTRAMUSCULAR; INTRAVENOUS at 01:10

## 2018-04-20 RX ADMIN — DILTIAZEM HYDROCHLORIDE 120 MG: 120 CAPSULE, COATED, EXTENDED RELEASE ORAL at 09:14

## 2018-04-20 RX ADMIN — INSULIN ASPART 15 UNITS: 100 INJECTION, SOLUTION INTRAVENOUS; SUBCUTANEOUS at 14:58

## 2018-04-20 RX ADMIN — INSULIN ASPART 12 UNITS: 100 INJECTION, SOLUTION INTRAVENOUS; SUBCUTANEOUS at 13:02

## 2018-04-20 RX ADMIN — IPRATROPIUM BROMIDE AND ALBUTEROL SULFATE 3 ML: .5; 3 SOLUTION RESPIRATORY (INHALATION) at 06:57

## 2018-04-20 NOTE — PLAN OF CARE
Problem: Fall Risk (Adult)  Goal: Identify Related Risk Factors and Signs and Symptoms  Outcome: Outcome(s) achieved Date Met: 04/20/18    Goal: Absence of Fall  Outcome: Outcome(s) achieved Date Met: 04/20/18      Problem: Patient Care Overview  Goal: Plan of Care Review  Outcome: Outcome(s) achieved Date Met: 04/20/18    Goal: Individualization and Mutuality  Outcome: Outcome(s) achieved Date Met: 04/20/18    Goal: Discharge Needs Assessment  Outcome: Outcome(s) achieved Date Met: 04/20/18    Goal: Interprofessional Rounds/Family Conf  Outcome: Outcome(s) achieved Date Met: 04/20/18      Problem: Skin Injury Risk (Adult)  Goal: Identify Related Risk Factors and Signs and Symptoms  Outcome: Outcome(s) achieved Date Met: 04/20/18    Goal: Skin Health and Integrity  Outcome: Outcome(s) achieved Date Met: 04/20/18      Problem: Pneumonia (Adult)  Goal: Signs and Symptoms of Listed Potential Problems Will be Absent, Minimized or Managed (Pneumonia)  Outcome: Outcome(s) achieved Date Met: 04/20/18

## 2018-04-20 NOTE — DISCHARGE SUMMARY
"    [facility]      Name:  Ninoska Gloria   Age:  87 y.o.  Sex:  female  :  1931  MRN:  [id number]   Visit Number:  [id number]    Admission Date:  2018  Date of Discharge:  2018  Primary Care Physician:  ASAD Lo    Discharge Diagnoses:   1. Sepsis due to # 2, resolved  2. Left lower lobe pneumonia  3. Acute hyperkalemia, possibly due to underlying CKD with medication side effect - ACE-I  4. Hyperglycemia on admission  5. Elevated troponin level, likely supply demand mismatch, type 2 MI, echo shows preserved EF with diastolic changes  6. CKD stage III  7. History of atrial fibrillation, not on anticoagulation, in NSR currently   8. History of coronary artery disease s/p stent   9. Hypertensive urgency, present on admission  10. Hyperlipidemia  11. Hearing impairment   12. Episodes of hypoglycemia  13. Type 2 diabetes mellitus  14. Suspected acute exacerbation of diastolic heart failure, s/p IV Lasix x 2    Active Problems:    Pneumonia of lower lobe due to infectious organism      Presenting Problem:    Pneumonia of lower lobe due to infectious organism, unspecified laterality [J18.1]     Consults:     Consults     No orders found from 3/18/2018 to 2018.        Consulting Physician(s)             None            Procedures Performed:           History of presenting illness/Hospital Course:  Patient is an extremely pleasant 87-year-old female with medical history of coronary artery disease with history of inferior MI and stent to RCA in , atrial fibrillation not on anticoagulation, hypertension, dyslipidemia and type 2 diabetes mellitus on long-term insulin therapy who presented to the ER with complaints of \"smothering\"  and cough.  Patient states that she's been in her usual state of health and has been doing fairly well until yesterday when she started to develop a cough that is hacking in nature and nonproductive, but continuous " causing her to have soreness of her chest muscles. She also states that she was feeling so short of breath last night that she was unable to sleep. She denies any fever or chills. She has not had any sick contact. She denies any palpitations, chest pain, dizziness, leg swelling, abdominal pain diarrhea or dysuria. She does complain of constipation with hard stools. She was seen in the ER 1 week ago for an UTI.  She has not been hospitalized in over 10 years per patient. She follows with Dr. Peoples who's her cardiologist, but has not seen him in over 3 years.      Patient was noted to be hypertensive to 200/100 on arrival with normal heart rate and temperature. She was saturating around 90s on RA. Labs are notable for lactate of 2.4, blood glucose 347, BUN/Cr 60/1.6, this is better than her baaseline, potassium 6.4, Calcium 10.4, proBNP 7050, troponin 0.108, wbc 22K and negative urinalysis for UTI. EKG with nonspecific findings. CXR shows left lung patchy airspace disease. ABG on room air shows pH 7.38, Co2 34 and O2 65. She was given calcium gluconate, 10units of regular insulin with dextrose as well as IV Ceftriaxone and Azithromycin. She also received one dose of IV Labetalol 10mg. She was given 1L IVF bolus as well. She was admitted to the hospitalist service for further work up and management of sepsis suspected to be due to pneumonia.     Patient was admitted to the ICU and initiated on IV Ceftriaxone and Azithromycin. After her initial fluid bolus, she was placed on maintenance dose IVF. She initially had some improvement in her cough and shortness of breath, but on 04/18 she developed worsening shortness of air with cough with hypoxia requiring higher FiO2. A Repeat CXR was done which showed worsening bilateral opacities. Her antibiotic coverage was broadened to Cefepime in place of Ceftriaxone and she continued to improve after that. She was able to provide a sputum sample which shows rare GPC in pairs and  GNR. Culture is not yet available. Her blood culture remained negative. She has been changed to oral Omnicef today and will need another 3 days of the same. In light of the findings on her CXR revealing emphysema, I have placed her on IV steroids which will be transitioned to three more days of oral prednisone on discharge. Albuterol inhaler pump has also been ordered. Patient is no longer requiring oxygen.     She was noted to have acute hyperkalemia with potassium level of 6.4 on admission with peaked T waves. She received treatment with insulin, dextrose and calcium gluconate on admission. Her repeat insulin level was noted to remain high, so was given Kayexalate and her potassium levels have been stable around 5 - 5.3. She has been taken off Triamterene and Lisinopril. Her blood pressures have also been elevated since admission. I have placed her on Hydralazine 50mg three times daily which has markedly improved her pressures.     Patient was noted to have an elevated BNP on admission at >7000. A 2-D echocardiogram was obtained which shows LVH, diastolic dysfunction, mild mitral regurgitation, RV systolic pressure of 79 mmHg and a small pericardial effusion. Ejection fraction is 56%. She has received 2 doses of IV Lasix 40mg with good urine output and improvement in BNP to about 4000. I will place her on 3 more days of Lasix.     In regards to her history of A. Fib, patient has been in sinus rhythm during her hospitalization. I will not initiate the patient on anticoagulation at this time and I have asked her to follow up with Dr. Peoples in 2-4 weeks for re-evaluation.     Patient was found to be hyperglyemic on admission and she has an A1c of 8.8%. However, on multiple occasions, she was found have low blood sugars, specially AM fasting. I did decrease her insulin dose due to this. This afternoon, patient's blood sugars are in the 400s range requiring multiple additional doses of novolog. I have advised the  patient to return to her regular dose of insulin at home at this time.     Patient has been evaluated by physical therapy and they recommended home care or acute short term rehabilitation. Patient wanted to go home with home care, however, there is no home care agency that serves her county under her insurance. Her daughter stated this morning that they will stay with her around the clock. Patient requested a walker which she will benefit from for better stabilization on ambulation.     Patient is stable for discharge home today.     Vital Signs:    Temp:  [97.9 °F (36.6 °C)-98.6 °F (37 °C)] 98.1 °F (36.7 °C)  Heart Rate:  [74-87] 87  Resp:  [16-20] 16  BP: (129-147)/(47-82) 143/82    Physical Exam:    General Appearance:  Alert and cooperative, not in any acute distress.   Head:  Atraumatic and normocephalic, without obvious abnormality.   Eyes:          PERRLA, conjunctivae and sclerae normal, no Icterus. No pallor. Extra-occular movements are within normal limits.   Ears:  Ears appear intact with no abnormalities noted.   Throat: No oral lesions, no thrush, oral mucosa moist.   Neck: Supple, trachea midline, no thyromegaly, no carotid bruit.   Back:   No kyphoscoliosis present. No tenderness to palpation,   range of motion normal.   Lungs:   Chest shape is normal. Breath sounds heard bilaterally equally.  No crackles or wheezing. No Pleural rub or bronchial breathing.   Heart:  Normal S1 and S2, no murmur, no gallop, no rub. No JVD.   Abdomen:   Normal bowel sounds, no masses, no organomegaly. Soft, non-tender, non-distended, no guarding, no rebound tenderness.   Extremities: Moves all extremities well, no edema, no cyanosis, no clubbing.   Pulses: Pulses palpable and equal bilaterally.   Skin: No bleeding, bruising or rash.   Lymph nodes: No palpable adenopathy.   Neurologic: Alert and oriented x 3. Moves all four limbs equally. No tremors. No facial asymetry.     Pertinent Lab Results:       Results from last 7  days  Lab Units 04/20/18  0544 04/19/18  0602 04/18/18  0520 04/17/18  0450 04/16/18  1243   SODIUM mmol/L 135* 139 144 143 141   POTASSIUM mmol/L 5.3* 5.0 4.4 5.2* 6.4*   CHLORIDE mmol/L 101 107 108* 112* 105   CO2 mmol/L 24.0* 24.0* 26.0 23.0* 24.0*   BUN mg/dL 41* 28* 33* 46* 60*   CREATININE mg/dL 1.20 1.10 1.30 1.20 1.60*   CALCIUM mg/dL 9.4 9.5 9.6 9.6 10.5*   BILIRUBIN mg/dL  --   --   --  0.3 0.5   ALK PHOS U/L  --   --   --  77 129*   ALT (SGPT) U/L  --   --   --  44 54   AST (SGOT) U/L  --   --   --  38 64*   GLUCOSE mg/dL 306* 44* 67* 249* 347*       Results from last 7 days  Lab Units 04/20/18  0544 04/19/18  0602 04/18/18  0520   WBC 10*3/mm3 5.15 8.27 9.69   HEMOGLOBIN g/dL 10.0* 10.4* 10.2*   HEMATOCRIT % 32.2* 32.5* 31.8*   PLATELETS 10*3/mm3 194 237 253           Results from last 7 days  Lab Units 04/17/18  0101 04/16/18  1929 04/16/18  1243   TROPONIN I ng/mL 0.099* 0.100* 0.108*       Results from last 7 days  Lab Units 04/20/18  0544   PROBNP pg/mL 4,940.0*               Results from last 7 days  Lab Units 04/16/18  1345   PH, ARTERIAL pH units 7.386   PO2 ART mm Hg 64.7*   PCO2, ARTERIAL mm Hg 34.9*   HCO3 ART mmol/L 20.9*       Results from last 7 days  Lab Units 04/16/18  1301   COLOR UA  Yellow   GLUCOSE UA  250 mg/dL (1+)*   KETONES UA  Negative   LEUKOCYTES UA  Negative   PH, URINE  6.5   BILIRUBIN UA  Negative   UROBILINOGEN UA  0.2 E.U./dL     Pain Management Panel     There is no flowsheet data to display.          Results from last 7 days  Lab Units 04/16/18  1333   BLOODCX  No growth at 4 days  No growth at 4 days       Pertinent Radiology Results:    Imaging Results (all)     Procedure Component Value Units Date/Time    XR Chest 1 View [[id number]] Collected:  04/18/18 0836     Updated:  04/18/18 0839    Narrative:       PROCEDURE: XR CHEST 1 VW-     HISTORY: cough/soa; J18.1-Lobar pneumonia, unspecified organism;  E87.5-Hyperkalemia; R74.8-Abnormal levels of other serum  enzymes;  R79.89-Other specified abnormal findings of blood chemistry     COMPARISON: April 16, 2018.     FINDINGS: The heart is normal in size. The mediastinum is unremarkable.  There has been interval development of bilateral airspace disease more  pronounced on the right than on the left which may reflect a pneumonia.  There is no pneumothorax.  There are no acute osseous abnormalities.           Impression:       Development of bilateral airspace disease suspicious for a  pneumonia.     Continued followup is recommended.     This report was finalized on 4/18/2018 8:37 AM by Hernesto Dickinson M.D..    XR Chest 2 View [[id number]] Collected:  04/16/18 1255     Updated:  04/16/18 1257    Narrative:       PROCEDURE: XR CHEST 2 VW-        HISTORY: SOA  triage protocol     COMPARISON: None.     FINDINGS: The heart is normal in size. The mediastinum is unremarkable.  There are emphysematous changes to the lungs. There is patchy airspace  disease in the left lung base which may reflect atelectasis or  pneumonia. There is no pneumothorax. There are no acute osseous  abnormalities.           Impression:       Patchy airspace disease in the left lung base which may  reflect atelectasis or pneumonia.           This report was finalized on 4/16/2018 12:55 PM by Hernesto Dickinson M.D..          Condition on Discharge:      Stable.    Code status during the hospital stay:    Full Code    Discharge Disposition:    [place] [facility] [place]charge Medications:     Ninoska Gloria   Home Medication Instructions REECE:[id number]    Printed on:04/20/18 1640   Medication Information                      acetaminophen (TYLENOL) 325 MG tablet  Take 650 mg by mouth Every 6 (Six) Hours As Needed for Mild Pain .             albuterol (PROVENTIL HFA;VENTOLIN HFA) 108 (90 Base) MCG/ACT inhaler  Inhale 2 puffs Every 4 (Four) Hours As Needed for Wheezing.             aspirin 81 MG EC tablet  Take 81 mg by mouth Daily.              Carboxymethylcell-Hypromellose (GENTEAL) 0.25-0.3 % gel  Apply 1 application to eye Every Night.             cefdinir (OMNICEF) 300 MG capsule  Take 1 capsule by mouth Daily for 3 doses.             Cholecalciferol (VITAMIN D3) 5000 units capsule capsule  Take 1,000 Units by mouth Daily.             Cranberry 1000 MG capsule  Take 1 capsule by mouth Daily.             diltiaZEM CD (CARDIZEM CD) 120 MG 24 hr capsule  Take 1 capsule by mouth Daily.             Flaxseed, Linseed, (FLAXSEED OIL) 1000 MG capsule  Take 2,000 mg by mouth Daily.             furosemide (LASIX) 40 MG tablet  Take 1 tablet by mouth Daily.             glipiZIDE (GLUCOTROL) 10 MG tablet  Take 10 mg by mouth 2 (Two) Times a Day Before Meals.             hydrALAZINE (APRESOLINE) 50 MG tablet  Take 1 tablet by mouth Every 8 (Eight) Hours.             hydrochlorothiazide (HYDRODIURIL) 25 MG tablet  Take 1 tablet by mouth Daily.             insulin detemir (LEVEMIR) 100 UNIT/ML injection  Inject 55 Units under the skin Every Night.             iron polysaccharides (NIFEREX) 150 MG capsule  Take 150 mg by mouth Daily.             levothyroxine (SYNTHROID, LEVOTHROID) 88 MCG tablet  Take 88 mcg by mouth Daily.             metoprolol tartrate (LOPRESSOR) 50 MG tablet  Take 1 tablet by mouth Every 12 (Twelve) Hours.             Multiple Vitamins-Minerals (MULTIVITAMIN ADULT PO)  Take  by mouth Daily.             Omega-3 Fatty Acids (FISH OIL) 1000 MG capsule capsule  Take 1,200 mg by mouth 2 (Two) Times a Day With Meals.             predniSONE (DELTASONE) 20 MG tablet  Take 1 tablet by mouth Daily.             raNITIdine (ZANTAC) 150 MG tablet  Take 150 mg by mouth 2 (Two) Times a Day.             simvastatin (ZOCOR) 10 MG tablet  Take 10 mg by mouth Every Night.             SITagliptin (JANUVIA) 50 MG tablet  Take 50 mg by mouth Every Night.             triamcinolone (KENALOG) 0.1 % ointment  Apply  topically Daily As Needed.             vitamin B-12  (CYANOCOBALAMIN) 1000 MCG tablet  Take 1,000 mcg by mouth 4 (Four) Times a Week.             vitamin C (ASCORBIC ACID) 500 MG tablet  Take 1,000 mg by mouth Daily.                 Discharge Diet:     Diet Instructions     Diet: Consistent Carbohydrate, Cardiac       Discharge Diet:   Consistent Carbohydrate  Cardiac             Activity at Discharge:     Activity Instructions     Activity as Tolerated             Follow-up Appointments:    Additional Instructions for the Follow-ups that You Need to Schedule     Discharge Follow-up with PCP    As directed      Follow Up Details:  1 week         Discharge Follow-up with Specified Provider: Dr. Peoples; 1 Month    As directed      To:  Dr. Peoples    Follow Up:  1 Month         Referral to Home Health    As directed      Face to Face Visit Date:  4/20/2018    Follow-up Provider for Plan of Care?:  I treated the patient in an acute care facility and will not continue treatment after discharge.    Follow-up Provider:  ANSLEY ROSEN [[id number]]    Reason/Clinical Findings:  Debility, COPD, diastolic CHF    Describe mobility limitations that make leaving home difficult:  decreased mobility with ADLs, shortness of breath    Nursing/Therapeutic Services Requested:  Skilled Nursing Physical Therapy    Skilled nursing orders:  Medication education COPD management CHF management    PT orders:  Therapeutic exercise Gait Training Transfer training Strengthening    Weight Bearing Status:  As Tolerated    Frequency:  1 Week 1           Follow-up Information     ASAD Lo .    Specialty:  Family Medicine  Why:  1 week  Contact information:  [address] [place], [place] [id number] [phone]                   No future appointments.    Additional Instructions for the Follow-ups that You Need to Schedule     Discharge Follow-up with PCP    As directed      Follow Up Details:  1 week         Discharge Follow-up with Specified Provider: Dr. Peoples; 1 Month    As directed      To:   Dr. Peoples    Follow Up:  1 Month         Referral to Home Health    As directed      Face to Face Visit Date:  4/20/2018    Follow-up Provider for Plan of Care?:  I treated the patient in an acute care facility and will not continue treatment after discharge.    Follow-up Provider:  ANSLEY ROSEN [[id number]]    Reason/Clinical Findings:  Debility, COPD, diastolic CHF    Describe mobility limitations that make leaving home difficult:  decreased mobility with ADLs, shortness of breath    Nursing/Therapeutic Services Requested:  Skilled Nursing Physical Therapy    Skilled nursing orders:  Medication education COPD management CHF management    PT orders:  Therapeutic exercise Gait Training Transfer training Strengthening    Weight Bearing Status:  As Tolerated    Frequency:  1 Week 1               Test Results Pending at Discharge:     Order Current Status    Blood Culture With ADRYAN - Blood, Preliminary result    Blood Culture With ADRYAN - Blood, Preliminary result    Respiratory Culture - Sputum, Cough Preliminary result             Elvira Garcia MD  04/20/18  4:45 PM    Time spent: 35 minutes    Dictated utilizing Dragon dictation.

## 2018-04-20 NOTE — PLAN OF CARE
Problem: Fall Risk (Adult)  Goal: Identify Related Risk Factors and Signs and Symptoms  Outcome: Ongoing (interventions implemented as appropriate)    Goal: Absence of Fall  Outcome: Ongoing (interventions implemented as appropriate)      Problem: Patient Care Overview  Goal: Plan of Care Review  Outcome: Ongoing (interventions implemented as appropriate)   04/19/18 1055 04/19/18 2000 04/20/18 0240   Coping/Psychosocial   Plan of Care Reviewed With --  patient;family --    Plan of Care Review   Progress improving --  --    OTHER   Outcome Summary --  --  Patient reports feeling better tonight. Only complains of shortness of breath with exertion. IV steroids have caused Blood glucose to rise significantly . Was 490 at 2100 and was covered with 12 units per Dr. Corona     Goal: Discharge Needs Assessment  Outcome: Ongoing (interventions implemented as appropriate)    Goal: Interprofessional Rounds/Family Conf  Outcome: Ongoing (interventions implemented as appropriate)      Problem: Skin Injury Risk (Adult)  Goal: Identify Related Risk Factors and Signs and Symptoms  Outcome: Ongoing (interventions implemented as appropriate)    Goal: Skin Health and Integrity  Outcome: Ongoing (interventions implemented as appropriate)      Problem: Pneumonia (Adult)  Goal: Signs and Symptoms of Listed Potential Problems Will be Absent, Minimized or Managed (Pneumonia)  Outcome: Ongoing (interventions implemented as appropriate)

## 2018-04-20 NOTE — PROGRESS NOTES
Continued Stay Note   [employer]     Patient Name: Ninoska Gloria  MRN: [id number]  Today's Date: 4/20/2018    Admit Date: 4/16/2018          Discharge Plan     Row Name 04/20/18 1226       Plan    Plan Comments Confirmed pt physical adress and phone numbers There is only  one provider for Home Health in [facility] called and faxed information will need to send Discahrge summary update received call from [facility] They are not in network with pt insurance and will not be able to provide services  There are no other Home Health Agencies  Updated Pt Family and Dr Garcia of this .Order for walker form list provided pt choose [facility] they will deliver to pt room               Discharge Codes    No documentation.       Expected Discharge Date and Time     Expected Discharge Date Expected Discharge Time    Apr 20, 2018             Ira Pagan

## 2018-04-20 NOTE — PROGRESS NOTES
Case Management Discharge Note    Final Note: unable to obtain Home Health see note pt discahrged home with family     Destination     No service coordination in this encounter.      Durable Medical Equipment     No service coordination in this encounter.      Dialysis/Infusion     No service coordination in this encounter.      Home Medical Care     No service coordination in this encounter.      Social Care     No service coordination in this encounter.        Other:  (private car )    Final Discharge Disposition Code: 01 - home or self-care

## 2018-04-20 NOTE — PROGRESS NOTES
Case Management Discharge Note    Final Note: Discharged with Home Health     Destination     No service coordination in this encounter.      Durable Medical Equipment     No service coordination in this encounter.      Dialysis/Infusion     No service coordination in this encounter.      Home Medical Care     No service coordination in this encounter.      Social Care     No service coordination in this encounter.        Other:  (private car )    Final Discharge Disposition Code: 06 - home with home health care

## 2018-04-21 LAB
BACTERIA SPEC AEROBE CULT: NORMAL
BACTERIA SPEC AEROBE CULT: NORMAL

## 2018-04-22 LAB
BACTERIA SPEC RESP CULT: NORMAL
GRAM STN SPEC: NORMAL

## 2018-05-23 NOTE — PROGRESS NOTES
[facility] at [facility]  Consultation H&P  Ninoska Gloria  2/8/1931  [phone]  VISIT DATE:  05/24/18    PCP: ASAD Lo  [address] [place] [id number]    IDENTIFICATION: A 87 y.o. female from [place], [place]    CC:  Chief Complaint   Patient presents with   • Coronary Artery Disease     New Patient    • Atrial Fibrillation       PROBLEM LIST:  1. CAD  1. Inferior MI 1996 s/p tpa  2. stent to RCA and LAD 1996 Dr. Velasquez  3. stress Echo 2006 - WNL  4. Echo 8/2012 - normal LV function, no significant valvular abnormalities  5. 8/16/13 MPS: Abnormal large sized severely fixed inferior defect, moderate size moderately fixed lateral defect with no reversibility, EF 45%  2. Pulmonary hypertension  1. 4/17/18 echo: EF 56%, mild concentric LVH, grade 1 diastolic dysfunction, mild calcification of the aortic valve, mild MR, mild TR, RVSP 79 mmHg, small pericardial effusion  3. A-fib/flutter  1. [id number] 6  4. Hypertension  5. Dyslipidemia  6. DM 2, on insulin  1. 4/19/18 A1c 8.7  7. CKD  1. 4/18 Cr 1.2  8. Hypothyroidism  9. GERD  10. Osteoarthritis  11. History of retinal detachment  12. Frequent falls  13. Surgical history  1. 5 c section     Allergies  No Known Allergies    Current Medications    Current Outpatient Prescriptions:   •  acetaminophen (TYLENOL) 325 MG tablet, Take 650 mg by mouth Every 6 (Six) Hours As Needed for Mild Pain ., Disp: , Rfl:   •  albuterol (PROVENTIL HFA;VENTOLIN HFA) 108 (90 Base) MCG/ACT inhaler, Inhale 2 puffs Every 4 (Four) Hours As Needed for Wheezing., Disp: 18 g, Rfl: 0  •  aspirin 81 MG EC tablet, Take 81 mg by mouth Daily., Disp: , Rfl:   •  Carboxymethylcell-Hypromellose (GENTEAL) 0.25-0.3 % gel, Apply 1 application to eye Every Night., Disp: , Rfl:   •  Cholecalciferol (VITAMIN D3) 5000 units capsule capsule, Take 1,000 Units by mouth Daily., Disp: , Rfl:   •  diltiaZEM CD (CARDIZEM CD) 120 MG 24 hr capsule, Take 1 capsule by mouth  Daily., Disp: 30 capsule, Rfl: 0  •  Flaxseed, Linseed, (FLAXSEED OIL) 1000 MG capsule, Take 2,000 mg by mouth Daily., Disp: , Rfl:   •  furosemide (LASIX) 40 MG tablet, Take 1 tablet by mouth As Needed (if gain >2lbs in 24 hours)., Disp: 3 tablet, Rfl: 0  •  glipiZIDE (GLUCOTROL) 10 MG tablet, Take 20 mg by mouth 2 (Two) Times a Day Before Meals., Disp: , Rfl:   •  hydrALAZINE (APRESOLINE) 50 MG tablet, Take 1 tablet by mouth Every 8 (Eight) Hours., Disp: 90 tablet, Rfl: 0  •  hydrochlorothiazide (HYDRODIURIL) 25 MG tablet, Take 1 tablet by mouth Daily., Disp: 30 tablet, Rfl: 0  •  insulin detemir (LEVEMIR) 100 UNIT/ML injection, Inject 55 Units under the skin Every Night. (Patient taking differently: Inject 35 Units under the skin Every Night.), Disp: , Rfl: 12  •  iron polysaccharides (NIFEREX) 150 MG capsule, Take 150 mg by mouth Daily., Disp: , Rfl:   •  levothyroxine (SYNTHROID, LEVOTHROID) 88 MCG tablet, Take 88 mcg by mouth Daily., Disp: , Rfl:   •  metoprolol tartrate (LOPRESSOR) 50 MG tablet, Take 1 tablet by mouth Every 12 (Twelve) Hours., Disp: 60 tablet, Rfl: 0  •  Multiple Vitamins-Minerals (MULTIVITAMIN ADULT PO), Take  by mouth Daily., Disp: , Rfl:   •  Omega-3 Fatty Acids (FISH OIL) 1000 MG capsule capsule, Take 1,200 mg by mouth 2 (Two) Times a Day With Meals., Disp: , Rfl:   •  raNITIdine (ZANTAC) 150 MG tablet, Take 150 mg by mouth 2 (Two) Times a Day., Disp: , Rfl:   •  simvastatin (ZOCOR) 10 MG tablet, Take 10 mg by mouth Every Night., Disp: , Rfl:   •  SITagliptin (JANUVIA) 50 MG tablet, Take 50 mg by mouth Every Night., Disp: , Rfl:   •  triamcinolone (KENALOG) 0.1 % ointment, Apply  topically Daily As Needed., Disp: , Rfl:   •  vitamin B-12 (CYANOCOBALAMIN) 1000 MCG tablet, Take 1,000 mcg by mouth 4 (Four) Times a Week., Disp: , Rfl:   •  vitamin C (ASCORBIC ACID) 500 MG tablet, Take 1,000 mg by mouth Daily., Disp: , Rfl:   •  Cranberry 1000 MG capsule, Take 1 capsule by mouth Daily., Disp:  ", Rfl:      History of Present Illness   HPI  This is an 87-year-old female with the above-mentioned PMH who presents to reestablish care with Dr. Peoples following a hospitalization at [facility] from 4/16/18-4/20/18 for sepsis from pneumonia, as well as volume overload.  Her BNP on admission was greater than 7000 and this improved with diuresis.  Echo showed ejection fraction of 56%.  She has a history of A. fib and remained in sinus rhythm during her hospitalization.  She is not anticoagulated.  Additionally she had labile blood sugars during her hospitalization.  It was advised that she be discharged to a short-term rehabilitation facility however the patient declined this and went home with her daughter he was to give her around-the-clock care.    She has not been seen by our clinic since 2015.  At that time was recommended for her to have a Lexiscan Cardiolite stress test.  She was recently diagnosed with A. fib at that time and anticoagulation was deferred until receiving the results of her stress test.  She did not follow-up after this.    The pt reports since she was d/c'd she's been feeling weak and has had low blood sugars especially over night. She has not spoken with her PCP office regarding this. She states she will occasionally have \"twinges of CP\". She gets dyspneic on exertion, but this has improved greatly since her hospitalization. She has not been on any loop diuretics since her hospitalization. Her daughters report they've been waking her up in the night to give her hydralazine every 8 hours.     Pt denies any chest pain, dyspnea at rest, dyspnea on exertion, orthopnea, PND, palpitations, lower extremity edema, or claudication. Pt denies history of CHF, DVT, PE, MI, CVA, TIA, or rheumatic fever.     ROS  Review of Systems   Constitution: Positive for weakness and malaise/fatigue.   Cardiovascular: Positive for dyspnea on exertion.   Respiratory: Positive for shortness of breath.    Musculoskeletal: " "Positive for arthritis.   Neurological: Positive for disturbances in coordination and light-headedness.   All other systems reviewed and are negative.      SOCIAL HX  Social History     Social History   • Marital status:      Spouse name: N/A   • Number of children: N/A   • Years of education: N/A     Occupational History   • Not on file.     Social History Main Topics   • Smoking status: Never Smoker   • Smokeless tobacco: Never Used   • Alcohol use No   • Drug use: No   • Sexual activity: Defer     Other Topics Concern   • Not on file     Social History Narrative   • No narrative on file       FAMILY HX  No family history on file.    Vitals:    05/24/18 1027   BP: 118/62   BP Location: Right arm   Patient Position: Sitting   Pulse: 60   Weight: 61.9 kg (136 lb 6.4 oz)   Height: 149.9 cm (59\")       PHYSICAL EXAMINATION:  Physical Exam   Constitutional: She is oriented to person, place, and time. She appears well-developed and well-nourished. No distress.   HENT:   Head: Normocephalic and atraumatic.   Right Ear: External ear normal.   Left Ear: External ear normal.   Nose: Nose normal.   Eyes: Conjunctivae and EOM are normal.   Neck: Neck supple. No hepatojugular reflux and no JVD present. Carotid bruit is not present. No thyromegaly present.   Cardiovascular: Normal rate, regular rhythm, S1 normal, S2 normal, normal heart sounds, intact distal pulses and normal pulses.  Exam reveals no gallop, no distant heart sounds and no midsystolic click.    No murmur heard.  Pulses:       Radial pulses are 2+ on the right side, and 2+ on the left side.        Dorsalis pedis pulses are 2+ on the right side, and 2+ on the left side.        Posterior tibial pulses are 2+ on the right side, and 2+ on the left side.   Pulmonary/Chest: Effort normal and breath sounds normal. No respiratory distress. She has no decreased breath sounds. She has no wheezes. She has no rhonchi. She has no rales.   Abdominal: Soft. Bowel sounds " are normal. There is no hepatosplenomegaly. There is no tenderness.   Musculoskeletal: Normal range of motion. She exhibits no edema.   Unsteady gait   Neurological: She is alert and oriented to person, place, and time.   No focal deficits.   Skin: Skin is warm and dry. No erythema.   Psychiatric: She has a normal mood and affect. Thought content normal.   Nursing note and vitals reviewed.    Diagnostic Data:    ECG 12 Lead  Date/Time: 5/24/2018 10:42 AM  Performed by: COLLINS MALIK  Authorized by: COLLINS MALIK   Rhythm: sinus rhythm  BPM: 60  Clinical impression: normal ECG          Lab Results   Component Value Date    GLUCOSE 306 (H) 04/20/2018    BUN 41 (H) 04/20/2018    CREATININE 1.20 04/20/2018     (L) 04/20/2018    K 5.3 (H) 04/20/2018     04/20/2018    CO2 24.0 (L) 04/20/2018     Lab Results   Component Value Date    HGBA1C 8.7 (H) 04/19/2018     Lab Results   Component Value Date    WBC 5.15 04/20/2018    HGB 10.0 (L) 04/20/2018    HCT 32.2 (L) 04/20/2018     04/20/2018       ASSESSMENT:   Diagnosis Plan   1. Coronary artery disease involving native coronary artery of native heart without angina pectoris  furosemide (LASIX) 40 MG tablet   2. Essential hypertension     3. Dyslipidemia     4. Type 2 diabetes mellitus without complication, with long-term current use of insulin     5. Paroxysmal atrial fibrillation         PLAN:  1. Remote MI with no anginal equivalent.  Continue medical management.  2. BP acceptable.  We will discontinue hydralazine and hydrochlorothiazide. For volume overload, pt is to weigh herself every morning and if she gains more than 2 lbs in 24 hours she should take a lasix.   3. Hypoglycemia with frequent BGs in the 50s-60s in the morning is concerning. PT instructed to call PCP for sooner follow up. We recommend she decrease her levemir franco from 35 units to 20 units at bedtime until further instruction from PCP.   4. No PAF recurrence during recent  hospitalization. Continue beta blocker. Pt is high fall risk d/t instability and hypoglycemia. No anticoagulation at this time. She may continue ASA 81 mg.     Scribed for Teo Peoples MD by April Maxwell PA-C. 5/24/2018  12:06 PM  I, Teo Peoples MD, personally performed the services described in this documentation as scribed by the above named individual in my presence, and it is both accurate and complete.  5/24/2018  12:11 PM    Teo Peoples MD, FACC

## 2018-05-24 ENCOUNTER — OFFICE VISIT (OUTPATIENT)
Dept: CARDIOLOGY | Facility: CLINIC | Age: 83
End: 2018-05-24

## 2018-05-24 VITALS
HEIGHT: 59 IN | BODY MASS INDEX: 27.5 KG/M2 | HEART RATE: 60 BPM | WEIGHT: 136.4 LBS | DIASTOLIC BLOOD PRESSURE: 62 MMHG | SYSTOLIC BLOOD PRESSURE: 118 MMHG

## 2018-05-24 DIAGNOSIS — I25.10 CORONARY ARTERY DISEASE INVOLVING NATIVE CORONARY ARTERY OF NATIVE HEART WITHOUT ANGINA PECTORIS: Primary | ICD-10-CM

## 2018-05-24 DIAGNOSIS — I10 ESSENTIAL HYPERTENSION: ICD-10-CM

## 2018-05-24 DIAGNOSIS — E78.5 DYSLIPIDEMIA: ICD-10-CM

## 2018-05-24 DIAGNOSIS — E11.9 TYPE 2 DIABETES MELLITUS WITHOUT COMPLICATION, WITH LONG-TERM CURRENT USE OF INSULIN (HCC): ICD-10-CM

## 2018-05-24 DIAGNOSIS — I48.0 PAROXYSMAL ATRIAL FIBRILLATION (HCC): ICD-10-CM

## 2018-05-24 DIAGNOSIS — Z79.4 TYPE 2 DIABETES MELLITUS WITHOUT COMPLICATION, WITH LONG-TERM CURRENT USE OF INSULIN (HCC): ICD-10-CM

## 2018-05-24 PROCEDURE — 93000 ELECTROCARDIOGRAM COMPLETE: CPT | Performed by: INTERNAL MEDICINE

## 2018-05-24 PROCEDURE — 99214 OFFICE O/P EST MOD 30 MIN: CPT | Performed by: INTERNAL MEDICINE

## 2018-05-24 RX ORDER — FUROSEMIDE 40 MG/1
40 TABLET ORAL AS NEEDED
Qty: 3 TABLET | Refills: 0 | Status: SHIPPED | OUTPATIENT
Start: 2018-05-24 | End: 2020-10-01

## 2018-11-19 ENCOUNTER — HOSPITAL ENCOUNTER (EMERGENCY)
Facility: HOSPITAL | Age: 83
Discharge: HOME | End: 2018-11-19
Attending: EMERGENCY MEDICINE
Payer: MEDICARE

## 2018-11-19 VITALS
OXYGEN SATURATION: 94 % | DIASTOLIC BLOOD PRESSURE: 49 MMHG | RESPIRATION RATE: 16 BRPM | SYSTOLIC BLOOD PRESSURE: 162 MMHG | WEIGHT: 132 LBS | TEMPERATURE: 97.4 F | BODY MASS INDEX: 25.91 KG/M2 | HEART RATE: 56 BPM | HEIGHT: 60 IN

## 2018-11-19 DIAGNOSIS — B02.9 HERPES ZOSTER WITHOUT COMPLICATION: Primary | ICD-10-CM

## 2018-11-19 PROCEDURE — 99282 EMERGENCY DEPT VISIT SF MDM: CPT

## 2018-11-19 PROCEDURE — 6370000000 HC RX 637 (ALT 250 FOR IP): Performed by: EMERGENCY MEDICINE

## 2018-11-19 RX ORDER — ASPIRIN 81 MG/1
81 TABLET ORAL DAILY
COMMUNITY

## 2018-11-19 RX ORDER — ACYCLOVIR 200 MG/1
800 CAPSULE ORAL
Status: DISCONTINUED | OUTPATIENT
Start: 2018-11-19 | End: 2018-11-19 | Stop reason: ALTCHOICE

## 2018-11-19 RX ORDER — HYDROCODONE BITARTRATE AND ACETAMINOPHEN 5; 325 MG/1; MG/1
.5-1 TABLET ORAL EVERY 6 HOURS PRN
Qty: 10 TABLET | Refills: 0 | Status: SHIPPED | OUTPATIENT
Start: 2018-11-19 | End: 2018-11-22

## 2018-11-19 RX ORDER — IRON POLYSACCHARIDE COMPLEX 150 MG
150 CAPSULE ORAL DAILY
COMMUNITY

## 2018-11-19 RX ORDER — ECHINACEA 400 MG
2000 CAPSULE ORAL 2 TIMES DAILY
COMMUNITY

## 2018-11-19 RX ORDER — DILTIAZEM HYDROCHLORIDE 120 MG/1
240 CAPSULE, COATED, EXTENDED RELEASE ORAL DAILY
COMMUNITY
Start: 2018-04-21

## 2018-11-19 RX ORDER — CHLORAL HYDRATE 500 MG
1200 CAPSULE ORAL 2 TIMES DAILY
COMMUNITY

## 2018-11-19 RX ORDER — LEVOTHYROXINE SODIUM 88 UG/1
100 TABLET ORAL DAILY
COMMUNITY

## 2018-11-19 RX ORDER — LANOLIN ALCOHOL/MO/W.PET/CERES
1000 CREAM (GRAM) TOPICAL SEE ADMIN INSTRUCTIONS
COMMUNITY

## 2018-11-19 RX ORDER — ACETAMINOPHEN 325 MG/1
650 TABLET ORAL PRN
COMMUNITY

## 2018-11-19 RX ORDER — ACYCLOVIR 800 MG/1
800 TABLET ORAL
Status: DISCONTINUED | OUTPATIENT
Start: 2018-11-19 | End: 2018-11-19 | Stop reason: ALTCHOICE

## 2018-11-19 RX ORDER — ASCORBIC ACID 500 MG
1000 TABLET ORAL DAILY
COMMUNITY

## 2018-11-19 RX ORDER — RANITIDINE 150 MG/1
150 TABLET ORAL 2 TIMES DAILY
COMMUNITY
End: 2022-03-17

## 2018-11-19 RX ORDER — GLIPIZIDE 10 MG/1
20 TABLET ORAL 2 TIMES DAILY
COMMUNITY

## 2018-11-19 RX ORDER — FUROSEMIDE 40 MG/1
20 TABLET ORAL DAILY
COMMUNITY
Start: 2018-05-24

## 2018-11-19 RX ORDER — VALACYCLOVIR HYDROCHLORIDE 500 MG/1
1000 TABLET, FILM COATED ORAL 2 TIMES DAILY
Status: DISCONTINUED | OUTPATIENT
Start: 2018-11-19 | End: 2018-11-19 | Stop reason: HOSPADM

## 2018-11-19 RX ORDER — METOPROLOL TARTRATE 50 MG/1
100 TABLET, FILM COATED ORAL 2 TIMES DAILY
COMMUNITY
Start: 2018-04-20

## 2018-11-19 RX ORDER — SIMVASTATIN 10 MG
10 TABLET ORAL DAILY
COMMUNITY

## 2018-11-19 RX ORDER — VALACYCLOVIR HYDROCHLORIDE 1 G/1
1000 TABLET, FILM COATED ORAL 3 TIMES DAILY
Qty: 21 TABLET | Refills: 0 | Status: SHIPPED | OUTPATIENT
Start: 2018-11-19 | End: 2018-11-26

## 2018-11-19 RX ADMIN — VALACYCLOVIR 1000 MG: 500 TABLET, FILM COATED ORAL at 12:35

## 2018-11-26 ENCOUNTER — OFFICE VISIT (OUTPATIENT)
Dept: CARDIOLOGY | Facility: CLINIC | Age: 83
End: 2018-11-26

## 2018-11-26 VITALS
SYSTOLIC BLOOD PRESSURE: 120 MMHG | WEIGHT: 132.8 LBS | DIASTOLIC BLOOD PRESSURE: 68 MMHG | HEIGHT: 60 IN | BODY MASS INDEX: 26.07 KG/M2 | HEART RATE: 68 BPM | OXYGEN SATURATION: 95 %

## 2018-11-26 DIAGNOSIS — E11.9 TYPE 2 DIABETES MELLITUS WITHOUT COMPLICATION, WITH LONG-TERM CURRENT USE OF INSULIN (HCC): ICD-10-CM

## 2018-11-26 DIAGNOSIS — I48.0 PAROXYSMAL ATRIAL FIBRILLATION (HCC): ICD-10-CM

## 2018-11-26 DIAGNOSIS — Z79.4 TYPE 2 DIABETES MELLITUS WITHOUT COMPLICATION, WITH LONG-TERM CURRENT USE OF INSULIN (HCC): ICD-10-CM

## 2018-11-26 DIAGNOSIS — I25.10 CORONARY ARTERY DISEASE INVOLVING NATIVE CORONARY ARTERY OF NATIVE HEART WITHOUT ANGINA PECTORIS: Primary | ICD-10-CM

## 2018-11-26 DIAGNOSIS — I10 ESSENTIAL HYPERTENSION: ICD-10-CM

## 2018-11-26 PROCEDURE — 99214 OFFICE O/P EST MOD 30 MIN: CPT | Performed by: INTERNAL MEDICINE

## 2018-11-26 NOTE — PROGRESS NOTES
[facility]     [facility]  2/8/1931  [phone]  [phone]  VISIT DATE:  11/26/18    PCP: Lizette Fitzpatrick, PA  [address] [place] [id number]    IDENTIFICATION: A 87 y.o. female from [place], [place]    CC:  Chief Complaint   Patient presents with   • Coronary Artery Disease       PROBLEM LIST:  1. CAD  1. Inferior MI 1996 s/p tpa  2. stent to RCA and LAD 1996 Dr. Velasquez  3. stress Echo 2006 - WNL  4. Echo 8/2012 - normal LV function, no significant valvular abnormalities  5. 8/16/13 MPS: Abnormal large sized severely fixed inferior defect, moderate size moderately fixed lateral defect with no reversibility, EF 45%  2. Pulmonary hypertension  1. 4/17/18 echo: EF 56%, mild concentric LVH, grade 1 diastolic dysfunction, mild calcification of the aortic valve, mild MR, mild TR, RVSP 79 mmHg, small pericardial effusion  3. A-fib/flutter  1. [id number] 6  4. Hypertension  5. Dyslipidemia  6. DM 2, on insulin  1. 4/19/18 A1c 8.7  7. CKD  1. 4/18 Cr 1.2  8. Hypothyroidism  9. GERD  10. Osteoarthritis  11. History of retinal detachment  12. Frequent falls  13. Surgical history  1. 5 c section     Allergies  No Known Allergies    Current Medications    Current Outpatient Medications:   •  acetaminophen (TYLENOL) 325 MG tablet, Take 650 mg by mouth Every 6 (Six) Hours As Needed for Mild Pain ., Disp: , Rfl:   •  aspirin 81 MG EC tablet, Take 81 mg by mouth Daily., Disp: , Rfl:   •  Cholecalciferol (VITAMIN D3) 5000 units capsule capsule, Take 1,000 Units by mouth Daily., Disp: , Rfl:   •  Cranberry 1000 MG capsule, Take 1 capsule by mouth Daily., Disp: , Rfl:   •  diltiaZEM CD (CARDIZEM CD) 120 MG 24 hr capsule, Take 1 capsule by mouth Daily. (Patient taking differently: Take 240 mg by mouth Daily.), Disp: 30 capsule, Rfl: 0  •  Flaxseed, Linseed, (FLAXSEED OIL) 1000 MG capsule, Take 2,000 mg by mouth 2 (Two) Times a Day., Disp: , Rfl:   •  furosemide (LASIX) 40 MG tablet, Take 1  tablet by mouth As Needed (if gain >2lbs in 24 hours). (Patient taking differently: Take 20 mg by mouth As Needed (5 days a week).), Disp: 3 tablet, Rfl: 0  •  glipiZIDE (GLUCOTROL) 10 MG tablet, Take 20 mg by mouth 2 (Two) Times a Day Before Meals., Disp: , Rfl:   •  insulin detemir (LEVEMIR) 100 UNIT/ML injection, Inject 55 Units under the skin Every Night. (Patient taking differently: Inject 45 Units under the skin into the appropriate area as directed Daily.), Disp: , Rfl: 12  •  iron polysaccharides (NIFEREX) 150 MG capsule, Take 150 mg by mouth Daily., Disp: , Rfl:   •  levothyroxine (SYNTHROID, LEVOTHROID) 88 MCG tablet, Take 88 mcg by mouth Daily., Disp: , Rfl:   •  metoprolol tartrate (LOPRESSOR) 50 MG tablet, Take 1 tablet by mouth Every 12 (Twelve) Hours. (Patient taking differently: Take 100 mg by mouth Every 12 (Twelve) Hours.), Disp: 60 tablet, Rfl: 0  •  Multiple Vitamins-Minerals (MULTIVITAMIN ADULT PO), Take  by mouth Daily., Disp: , Rfl:   •  Omega-3 Fatty Acids (FISH OIL) 1000 MG capsule capsule, Take 1,200 mg by mouth 2 (Two) Times a Day With Meals., Disp: , Rfl:   •  raNITIdine (ZANTAC) 150 MG tablet, Take 150 mg by mouth 2 (Two) Times a Day., Disp: , Rfl:   •  simvastatin (ZOCOR) 10 MG tablet, Take 10 mg by mouth Every Night., Disp: , Rfl:   •  SITagliptin (JANUVIA) 50 MG tablet, Take 50 mg by mouth Every Night., Disp: , Rfl:   •  triamcinolone (KENALOG) 0.1 % ointment, Apply  topically Daily As Needed., Disp: , Rfl:   •  vitamin B-12 (CYANOCOBALAMIN) 1000 MCG tablet, Take 1,000 mcg by mouth 4 (Four) Times a Week., Disp: , Rfl:   •  vitamin C (ASCORBIC ACID) 500 MG tablet, Take 1,000 mg by mouth Daily., Disp: , Rfl:      History of Present Illness   HPI  This is an 87-year-old female here for f/u.  She has not had any further low BGs. Not requiring extra dose of lasix. Is recovering from the shingles of her R flank.  Pt denies any chest pain, dyspnea at rest, dyspnea on exertion, orthopnea,  "PND, palpitations, lower extremity edema, or claudication. Pt denies history of CHF, DVT, PE, MI, CVA, TIA, or rheumatic fever.     ROS  Review of Systems   All other systems are reviewed and negative except what is detailed in the HPI      SOCIAL HX  Social History     Socioeconomic History   • Marital status:      Spouse name: Not on file   • Number of children: Not on file   • Years of education: Not on file   • Highest education level: Not on file   Social Needs   • Financial resource strain: Not on file   • Food insecurity - worry: Not on file   • Food insecurity - inability: Not on file   • Transportation needs - medical: Not on file   • Transportation needs - non-medical: Not on file   Occupational History   • Not on file   Tobacco Use   • Smoking status: Never Smoker   • Smokeless tobacco: Never Used   Substance and Sexual Activity   • Alcohol use: No   • Drug use: No   • Sexual activity: Defer   Other Topics Concern   • Not on file   Social History Narrative   • Not on file       FAMILY HX  History reviewed. No pertinent family history.    Vitals:    11/26/18 1114   BP: 120/68   BP Location: Right arm   Patient Position: Sitting   Pulse: 68   SpO2: 95%   Weight: 60.2 kg (132 lb 12.8 oz)   Height: 152.4 cm (60\")       PHYSICAL EXAMINATION:  Physical Exam   Constitutional: She is oriented to person, place, and time. She appears well-developed and well-nourished. No distress.   HENT:   Head: Normocephalic and atraumatic.   Right Ear: External ear normal.   Left Ear: External ear normal.   Nose: Nose normal.   Eyes: Conjunctivae and EOM are normal.   Neck: Neck supple. No hepatojugular reflux and no JVD present. Carotid bruit is not present. No thyromegaly present.   Cardiovascular: Normal rate, regular rhythm, S1 normal, S2 normal, normal heart sounds, intact distal pulses and normal pulses. Exam reveals no gallop, no distant heart sounds and no midsystolic click.   No murmur heard.  Pulses:       " Radial pulses are 2+ on the right side, and 2+ on the left side.        Dorsalis pedis pulses are 2+ on the right side, and 2+ on the left side.        Posterior tibial pulses are 2+ on the right side, and 2+ on the left side.   Pulmonary/Chest: Effort normal and breath sounds normal. No respiratory distress. She has no decreased breath sounds. She has no wheezes. She has no rhonchi. She has no rales.   Abdominal: Soft. Bowel sounds are normal. There is no hepatosplenomegaly. There is no tenderness.   Musculoskeletal: Normal range of motion. She exhibits no edema.   Unsteady gait   Neurological: She is alert and oriented to person, place, and time.   No focal deficits.   Skin: Skin is warm and dry. No erythema.   Psychiatric: She has a normal mood and affect. Thought content normal.   Nursing note and vitals reviewed.    Diagnostic Data:  Procedures  Lab Results   Component Value Date    GLUCOSE 306 (H) 04/20/2018    BUN 41 (H) 04/20/2018    CREATININE 1.20 04/20/2018     (L) 04/20/2018    K 5.3 (H) 04/20/2018     04/20/2018    CO2 24.0 (L) 04/20/2018     Lab Results   Component Value Date    HGBA1C 8.7 (H) 04/19/2018     Lab Results   Component Value Date    WBC 5.15 04/20/2018    HGB 10.0 (L) 04/20/2018    HCT 32.2 (L) 04/20/2018     04/20/2018       ASSESSMENT:   Diagnosis Plan   1. Coronary artery disease involving native coronary artery of native heart without angina pectoris     2. Essential hypertension     3. Type 2 diabetes mellitus without complication, with long-term current use of insulin (CMS/[facility])     4. Paroxysmal atrial fibrillation (CMS/[facility])         PLAN:  1. Remote MI with no anginal equivalent.  Continue medical management.  2. BP acceptable. Cont. Current regimen.   3. DM controlled per PCP. No further episodes of hypoglycemia.   4. No PAF recurrence during recent hospitalization. Continue beta blocker. Pt is high fall risk d/t instability and hypoglycemia. No anticoagulation at  this time. She may continue ASA 81 mg.     Scribed for Teo Peoples MD by April Maxwell PA-C. 11/26/2018  11:45 AM     Teo Peoples MD, [facility]

## 2019-04-23 ENCOUNTER — HOSPITAL ENCOUNTER (OUTPATIENT)
Dept: MAMMOGRAPHY | Facility: HOSPITAL | Age: 84
Discharge: HOME | End: 2019-04-23
Payer: COMMERCIAL

## 2019-04-23 DIAGNOSIS — Z12.39 BREAST CANCER SCREENING: ICD-10-CM

## 2019-04-23 PROCEDURE — 77067 SCR MAMMO BI INCL CAD: CPT

## 2019-12-02 ENCOUNTER — OFFICE VISIT (OUTPATIENT)
Dept: CARDIOLOGY | Facility: CLINIC | Age: 84
End: 2019-12-02

## 2019-12-02 VITALS
DIASTOLIC BLOOD PRESSURE: 60 MMHG | BODY MASS INDEX: 27.48 KG/M2 | OXYGEN SATURATION: 96 % | SYSTOLIC BLOOD PRESSURE: 148 MMHG | HEART RATE: 52 BPM | HEIGHT: 60 IN | WEIGHT: 140 LBS

## 2019-12-02 DIAGNOSIS — I48.0 PAROXYSMAL ATRIAL FIBRILLATION (HCC): ICD-10-CM

## 2019-12-02 DIAGNOSIS — I25.10 CORONARY ARTERY DISEASE INVOLVING NATIVE CORONARY ARTERY OF NATIVE HEART WITHOUT ANGINA PECTORIS: Primary | ICD-10-CM

## 2019-12-02 DIAGNOSIS — E78.2 MIXED HYPERLIPIDEMIA: ICD-10-CM

## 2019-12-02 DIAGNOSIS — I10 ESSENTIAL HYPERTENSION: ICD-10-CM

## 2019-12-02 PROCEDURE — 99214 OFFICE O/P EST MOD 30 MIN: CPT | Performed by: INTERNAL MEDICINE

## 2019-12-02 RX ORDER — METOPROLOL TARTRATE 50 MG/1
50 TABLET, FILM COATED ORAL EVERY 12 HOURS SCHEDULED
Qty: 60 TABLET | Refills: 0 | Status: SHIPPED | OUTPATIENT
Start: 2019-12-02 | End: 2020-10-01

## 2019-12-02 RX ORDER — CETIRIZINE HYDROCHLORIDE 10 MG/1
10 TABLET ORAL DAILY
COMMUNITY
End: 2022-12-19

## 2019-12-02 RX ORDER — QUETIAPINE FUMARATE 50 MG/1
50 TABLET, EXTENDED RELEASE ORAL NIGHTLY
COMMUNITY
End: 2020-10-01

## 2019-12-02 RX ORDER — FOLIC ACID 0.8 MG
1 TABLET ORAL AS NEEDED
COMMUNITY

## 2020-06-25 ENCOUNTER — HOSPITAL ENCOUNTER (OUTPATIENT)
Dept: MAMMOGRAPHY | Facility: HOSPITAL | Age: 85
Discharge: HOME | End: 2020-06-25
Payer: MEDICARE

## 2020-06-25 PROCEDURE — 77067 SCR MAMMO BI INCL CAD: CPT

## 2020-08-17 ENCOUNTER — HOSPITAL ENCOUNTER (OUTPATIENT)
Dept: CT IMAGING | Facility: HOSPITAL | Age: 85
Discharge: HOME | End: 2020-08-17
Payer: MEDICARE

## 2020-08-17 PROCEDURE — 71250 CT THORAX DX C-: CPT

## 2020-08-17 PROCEDURE — 74176 CT ABD & PELVIS W/O CONTRAST: CPT

## 2020-08-27 ENCOUNTER — HOSPITAL ENCOUNTER (OUTPATIENT)
Dept: ULTRASOUND IMAGING | Facility: HOSPITAL | Age: 85
Discharge: HOME | End: 2020-08-27
Payer: MEDICARE

## 2020-08-27 PROCEDURE — 76856 US EXAM PELVIC COMPLETE: CPT

## 2020-10-01 ENCOUNTER — OFFICE VISIT (OUTPATIENT)
Dept: OBSTETRICS AND GYNECOLOGY | Facility: CLINIC | Age: 85
End: 2020-10-01

## 2020-10-01 VITALS
BODY MASS INDEX: 27.48 KG/M2 | DIASTOLIC BLOOD PRESSURE: 80 MMHG | HEIGHT: 60 IN | SYSTOLIC BLOOD PRESSURE: 144 MMHG | WEIGHT: 140 LBS

## 2020-10-01 DIAGNOSIS — R19.7 DIARRHEA, UNSPECIFIED TYPE: ICD-10-CM

## 2020-10-01 DIAGNOSIS — K76.9 LIVER DISEASE, UNSPECIFIED: ICD-10-CM

## 2020-10-01 DIAGNOSIS — R93.89 THICKENED ENDOMETRIUM: Primary | ICD-10-CM

## 2020-10-01 DIAGNOSIS — R10.9 ABDOMINAL PAIN, UNSPECIFIED ABDOMINAL LOCATION: ICD-10-CM

## 2020-10-01 DIAGNOSIS — K76.0 FATTY INFILTRATION OF LIVER: ICD-10-CM

## 2020-10-01 DIAGNOSIS — R19.09 OTHER INTRA-ABDOMINAL AND PELVIC SWELLING, MASS AND LUMP: ICD-10-CM

## 2020-10-01 PROCEDURE — 99205 OFFICE O/P NEW HI 60 MIN: CPT | Performed by: OBSTETRICS & GYNECOLOGY

## 2020-10-01 RX ORDER — METOPROLOL SUCCINATE 100 MG/1
TABLET, EXTENDED RELEASE ORAL
COMMUNITY
Start: 2020-08-05 | End: 2020-12-07

## 2020-10-01 RX ORDER — METOPROLOL TARTRATE 50 MG/1
50 TABLET, FILM COATED ORAL 2 TIMES DAILY
COMMUNITY
Start: 2020-09-28

## 2020-10-01 RX ORDER — OMEPRAZOLE 20 MG/1
20 CAPSULE, DELAYED RELEASE ORAL DAILY
Status: ON HOLD | COMMUNITY
Start: 2020-09-09 | End: 2022-08-19

## 2020-10-01 RX ORDER — CLOBETASOL PROPIONATE 0.5 MG/G
OINTMENT TOPICAL
COMMUNITY
Start: 2013-01-30 | End: 2020-11-12

## 2020-10-01 RX ORDER — LEVOTHYROXINE SODIUM 0.1 MG/1
100 TABLET ORAL DAILY
COMMUNITY
Start: 2019-07-01

## 2020-10-01 NOTE — PROGRESS NOTES
Subjective  Chief Complaint   Patient presents with   • Imaging Only     Referral Lizette Fitzpatrick, thickened endometrium.      Patient is 89 y.o.  here as a new patient for evaluation of a recent abnormal ultrasound.  The patient is here with her daughter.  Patient is a poor historian.  She does report having lower abdominal pain and tenderness over the last several months.  Patient also reports having intermittent episodes of diarrhea.  Patient was seen by her primary care provider.  She had a CT scan obtained on  with oral contrast only.  Her CT report is reviewed today.  Patient was noted to have marked bronchiolitis, marked coronary atherosclerotic calcifications, a 3 mm pulmonary nodule in the right lower lobe recommend follow-up in 1 year, moderate to severe fatty infiltration of the liver, subtle nodularity with atrophy of the liver consistent with possible cirrhosis, gallstones with no thickening or dilatation noted, marked sigmoid and marked diffuse diverticulosis, a right adnexal cyst measuring 25 x 28 mm.  The patient then underwent a transvaginal ultrasound on  showing a small atrophic uterus with an endometrial thickness of 1.7 cm.  She also had a right ovarian complex cyst measuring 2 x 2.4 cm.  The patient has seen her primary care physician back since that time.  Patient reports she was given medication which has improved her diarrhea.  Patient reports control of her symptoms as long as she takes her medication.  Patient reports she still has intermittent pain and tenderness diffusely in her abdomen.  The patient does report at times her abdomen feels swollen.  She denies any early satiety or vomiting.  Patient denies any vaginal discharge or spotting.  Patient is uncertain regarding timing of menopause but reports many years ago.  Patient was not on any hormone replacement therapy.  She reports she had a Pap smear 1 year ago.  Patient denies any dark tarry stools.  She denies  any bleeding per rectum.  She denies any nausea or emesis.  Patient does report having a history of previous uterine polyps requiring D&Cs.  Patient reports benign pathology.  She also reports a history of having colonic polyps as well.    History  Past Medical History:   Diagnosis Date   • Anemia    • Atrial fibrillation (CMS/HCC)    • CHF (congestive heart failure) (CMS/HCC)    • COPD (chronic obstructive pulmonary disease) (CMS/HCC)    • Diabetes mellitus (CMS/HCC)    • Hyperlipidemia    • Hypertension    • Myocardial infarction (CMS/HCC)    • Osteoarthritis    • Ovarian cyst    • Pneumonia    • Renal failure    • Shingles     about to finish treatment up      Current Outpatient Medications on File Prior to Visit   Medication Sig Dispense Refill   • clobetasol (TEMOVATE) 0.05 % ointment Apply  topically to the appropriate area as directed.     • dilTIAZem (CARDIZEM) 60 MG tablet Take  by mouth.     • levothyroxine (SYNTHROID, LEVOTHROID) 100 MCG tablet      • acetaminophen (TYLENOL) 325 MG tablet Take 650 mg by mouth Every 6 (Six) Hours As Needed for Mild Pain .     • aspirin 81 MG EC tablet Take 81 mg by mouth Daily.     • cetirizine (zyrTEC) 10 MG tablet Take 10 mg by mouth Daily.     • Cholecalciferol (VITAMIN D3) 5000 units capsule capsule Take 1,000 Units by mouth Daily.     • Cranberry 1000 MG capsule Take 1 capsule by mouth Daily.     • Flaxseed, Linseed, (FLAXSEED OIL) 1000 MG capsule Take 2,000 mg by mouth 2 (Two) Times a Day.     • glipiZIDE (GLUCOTROL) 10 MG tablet Take 20 mg by mouth 2 (Two) Times a Day Before Meals.     • iron polysaccharides (NIFEREX) 150 MG capsule Take 150 mg by mouth Daily.     • Magnesium 500 MG capsule Take 1 tablet by mouth Daily.     • metoprolol succinate XL (TOPROL-XL) 100 MG 24 hr tablet      • metoprolol tartrate (LOPRESSOR) 100 MG tablet      • Multiple Vitamins-Minerals (MULTIVITAMIN ADULT PO) Take  by mouth Daily.     • mupirocin (BACTROBAN) 2 % ointment      • Omega-3  Fatty Acids (FISH OIL) 1000 MG capsule capsule Take 1,200 mg by mouth 2 (Two) Times a Day With Meals.     • omeprazole (priLOSEC) 20 MG capsule      • simvastatin (ZOCOR) 10 MG tablet Take 10 mg by mouth Every Night.     • SITagliptin (JANUVIA) 50 MG tablet Take 50 mg by mouth Every Night.     • triamcinolone (KENALOG) 0.1 % ointment Apply  topically Daily As Needed.     • vitamin B-12 (CYANOCOBALAMIN) 1000 MCG tablet Take 1,000 mcg by mouth 4 (Four) Times a Week.     • vitamin C (ASCORBIC ACID) 500 MG tablet Take 1,000 mg by mouth Daily.     • [DISCONTINUED] diltiaZEM CD (CARDIZEM CD) 120 MG 24 hr capsule Take 1 capsule by mouth Daily. (Patient taking differently: Take 240 mg by mouth Daily.) 30 capsule 0   • [DISCONTINUED] furosemide (LASIX) 40 MG tablet Take 1 tablet by mouth As Needed (if gain >2lbs in 24 hours). (Patient taking differently: Take 20 mg by mouth As Needed (5 days a week).) 3 tablet 0   • [DISCONTINUED] insulin detemir (LEVEMIR) 100 UNIT/ML injection Inject 55 Units under the skin Every Night. (Patient taking differently: Inject 65 Units under the skin into the appropriate area as directed Daily.)  12   • [DISCONTINUED] levothyroxine (SYNTHROID, LEVOTHROID) 88 MCG tablet Take 100 mcg by mouth Daily.     • [DISCONTINUED] metoprolol tartrate (LOPRESSOR) 50 MG tablet Take 1 tablet by mouth Every 12 (Twelve) Hours. 60 tablet 0   • [DISCONTINUED] QUEtiapine fumarate ER (SEROquel XR) 50 MG tablet sustained-release 24 hour tablet Take 50 mg by mouth Every Night.     • [DISCONTINUED] raNITIdine (ZANTAC) 150 MG tablet Take 150 mg by mouth 2 (Two) Times a Day.       No current facility-administered medications on file prior to visit.      No Known Allergies  Past Surgical History:   Procedure Laterality Date   • CATARACT EXTRACTION     •  SECTION     • OTHER SURGICAL HISTORY      Cardiac     History reviewed. No pertinent family history.  Social History     Socioeconomic History   • Marital status:  "     Spouse name: Not on file   • Number of children: Not on file   • Years of education: Not on file   • Highest education level: Not on file   Social Needs   • Financial resource strain: Not hard at all   • Food insecurity     Worry: Never true     Inability: Never true   • Transportation needs     Medical: No     Non-medical: No   Tobacco Use   • Smoking status: Never Smoker   • Smokeless tobacco: Never Used   Substance and Sexual Activity   • Alcohol use: No   • Drug use: No   • Sexual activity: Not Currently   Lifestyle   • Physical activity     Days per week: Patient refused     Minutes per session: Patient refused   • Stress: Not at all       Review of Systems  The following systems were reviewed and negative:  constitution, eyes, ENT, respiratory, cardiovascular, gastrointestinal, genitourinary, integument, breast, hematologic / lymphatic, musculoskeletal, neurological, behavioral/psych, endocrine and allergies / immunologic  Objective  Vitals:    10/01/20 1018   BP: 144/80   Weight: 63.5 kg (140 lb)   Height: 152.4 cm (60\")     Physical Exam:  General Appearance: alert, appears stated age and cooperative  Head: normocephalic, without obvious abnormality and atraumatic  Eyes: lids and lashes normal, conjunctivae and sclerae normal, no icterus, no pallor, corneas clear and PERRLA  Ears: ears appear intact with no abnormalities noted  Nose: nares normal, septum midline, mucosa normal and no drainage  Neck: suppple, trachea midline and no thyromegaly  Lungs: clear to auscultation, respirations regular, respirations even and respirations unlabored  Heart: regular rhythm and normal rate, normal S1, S2, no murmur, gallop, or rubs and no click  Breasts: Not performed.  Abdomen: normal bowel sounds, no masses, no hepatomegaly, no splenomegaly, soft non-tender, no guarding and no rebound tenderness  Pelvic: Not performed.  Extremities: moves extremities well, no edema, no cyanosis and no redness  Skin: no " bleeding, bruising or rash and no lesions noted  Lymph Nodes: no palpable adenopathy  Neuro: CN II-X grossly intact; sensation intact  Psych: normal mood and affect, oriented to person, time and place, thought content organized and appropriate judgment  Lab Review   No data reviewed    Imaging   CT of abdomen/pelvis report  Pelvic ultrasound report  Pelvic ultrasound images independantly reviewed; see report as noted  US Non-ob Transvaginal  Ninoska Gloria  : 1931  MRN: [id number]  Date: 10/1/2020    Reason for exam/History:  Thickened endometrium    The ultrasound images are reviewed.  The uterus is anteverted in position.    The uterus appears normal.  Uterus small and calcified.  The endometrium   is markedly thickened.  The endometrium measures 19.86  mms.  The right   ovary appears normal with normal vascular flow.  The left ovary is not   visualized.   There is no free fluid noted.    The exam limitations noted:  none    See ultrasound report for measurements and structures identified.    Sena Heaton MD, [facility]  OB GYN [place]    Decision to Obtain Medical Records  No    Summary of Medical Records  No    Assessment/Plan  Problem List Items Addressed This Visit     None      Visit Diagnoses     Thickened endometrium    -  Primary  Patient with a markedly thickened endometrium.  I had a long detailed and extensive discussion with the patient as well as her daughter regarding those findings.  The patient has not had any vaginal bleeding.  The patient's main complaint is her abdominal tenderness as well as diarrhea.  Will obtain labs today as noted.  I have discussed with the patient and her daughter the only definitive diagnosis is endometrial biopsy or D&C with diagnostic hysteroscopy.  Given the marked thickening as well as history of previous polyps I would recommend a D&C with diagnostic hysteroscopy.  Will refer patient to gastroenterology as well for further evaluation of  her abdominal pain and diarrhea.  Patient is to follow-up after seeing the gastroenterologist and will schedule a D&C for further evaluation.  Patient is to call if any vaginal bleeding before that time.  Patient and her family members are in agreement with the plan.    Relevant Orders    US Non-ob Transvaginal (Completed)    CBC & Differential    Comprehensive Metabolic Panel    aPTT    Protime-INR        CEA    Hepatic Function Panel    Amylase    Lipase    Ambulatory Referral to Gastroenterology    Diarrhea, unspecified type      Patient with new onset diarrhea over the last several months.  Patient had CT scan as noted above.  Will refer patient to GI for further evaluation and treatment.    Relevant Orders    CBC & Differential    Comprehensive Metabolic Panel    aPTT    Protime-INR        CEA    Hepatic Function Panel    Amylase    Lipase    Ambulatory Referral to Gastroenterology    Abdominal pain, unspecified abdominal location      Patient with abdominal pain as noted.  Will obtain labs today.  Will refer patient to gastroenterology as well.  Plan pending results.    Relevant Orders    CBC & Differential    Comprehensive Metabolic Panel    aPTT    Protime-INR        CEA    Hepatic Function Panel    Amylase    Lipase    Ambulatory Referral to Gastroenterology    Other intra-abdominal and pelvic swelling, mass and lump       Transvaginal ultrasound was obtained today.  There is no ascites noted.  Will obtain labs as noted.  Will refer patient to gastroenterology as well.  Patient will also follow-up for D&C and diagnostic hysteroscopy for further evaluation.    Relevant Orders    CBC & Differential    Comprehensive Metabolic Panel    aPTT    Protime-INR        CEA    Hepatic Function Panel    Amylase    Lipase    Ambulatory Referral to Gastroenterology    Fatty infiltration of liver      Patient with fatty infiltration of the liver.  Will obtain labs today as noted.  We will also refer  patient to GI.  Plan pending results.    Relevant Orders    CBC & Differential    Comprehensive Metabolic Panel    aPTT    Protime-INR        CEA    Hepatic Function Panel    Amylase    Lipase    Liver disease, unspecified      Patient with recent CT scan suggesting possible cirrhosis.  Will refer patient to gastroenterology.  Will obtain labs today as noted.  Plan pending results.    Relevant Orders    CBC & Differential    Comprehensive Metabolic Panel    aPTT    Protime-INR        CEA    Hepatic Function Panel    Amylase    Lipase        Total time spent today with Ninoska  was 65 minutes (level 5).  Greater than 50% of the time was spent face to face coordinating and planning care, answering her questions and counseling regarding pathophysiology of her presenting problem along with plans for any diagnositc work-up and treatment.         Follow up as discussed/scheduled  Note: Speech recognition transcription software may have been used to dictate portions of this document.  An attempt at proofreading has been made though minor errors in transcription may still be present.  This note was electronically signed.  Sena Heaton M.D.

## 2020-10-02 LAB
ALBUMIN SERPL-MCNC: 4.1 G/DL (ref 3.5–5.2)
ALBUMIN/GLOB SERPL: 1.7 G/DL
ALP SERPL-CCNC: 106 U/L (ref 39–117)
ALT SERPL-CCNC: 29 U/L (ref 1–33)
AMYLASE SERPL-CCNC: 72 U/L (ref 28–100)
APTT PPP: 31.8 SECONDS (ref 24.5–37.2)
AST SERPL-CCNC: 31 U/L (ref 1–32)
BASOPHILS # BLD AUTO: 0.04 10*3/MM3 (ref 0–0.2)
BASOPHILS NFR BLD AUTO: 0.5 % (ref 0–1.5)
BILIRUB DIRECT SERPL-MCNC: <0.2 MG/DL (ref 0–0.3)
BILIRUB SERPL-MCNC: <0.2 MG/DL (ref 0–1.2)
BUN SERPL-MCNC: 33 MG/DL (ref 8–23)
BUN/CREAT SERPL: 29.5 (ref 7–25)
CALCIUM SERPL-MCNC: 9.8 MG/DL (ref 8.6–10.5)
CANCER AG125 SERPL-ACNC: 113 U/ML (ref 0–38.1)
CEA SERPL-MCNC: 6.3 NG/ML
CHLORIDE SERPL-SCNC: 101 MMOL/L (ref 98–107)
CO2 SERPL-SCNC: 29.4 MMOL/L (ref 22–29)
CREAT SERPL-MCNC: 1.12 MG/DL (ref 0.57–1)
EOSINOPHIL # BLD AUTO: 0.38 10*3/MM3 (ref 0–0.4)
EOSINOPHIL NFR BLD AUTO: 4.6 % (ref 0.3–6.2)
ERYTHROCYTE [DISTWIDTH] IN BLOOD BY AUTOMATED COUNT: 13 % (ref 12.3–15.4)
GLOBULIN SER CALC-MCNC: 2.4 GM/DL
GLUCOSE SERPL-MCNC: 89 MG/DL (ref 65–99)
HCT VFR BLD AUTO: 38.2 % (ref 34–46.6)
HGB BLD-MCNC: 12.5 G/DL (ref 12–15.9)
IMM GRANULOCYTES # BLD AUTO: 0.03 10*3/MM3 (ref 0–0.05)
IMM GRANULOCYTES NFR BLD AUTO: 0.4 % (ref 0–0.5)
INR PPP: 1.03 (ref 0.9–1.1)
LIPASE SERPL-CCNC: 48 U/L (ref 13–60)
LYMPHOCYTES # BLD AUTO: 2.06 10*3/MM3 (ref 0.7–3.1)
LYMPHOCYTES NFR BLD AUTO: 24.7 % (ref 19.6–45.3)
MCH RBC QN AUTO: 29.7 PG (ref 26.6–33)
MCHC RBC AUTO-ENTMCNC: 32.7 G/DL (ref 31.5–35.7)
MCV RBC AUTO: 90.7 FL (ref 79–97)
MONOCYTES # BLD AUTO: 0.99 10*3/MM3 (ref 0.1–0.9)
MONOCYTES NFR BLD AUTO: 11.9 % (ref 5–12)
NEUTROPHILS # BLD AUTO: 4.83 10*3/MM3 (ref 1.7–7)
NEUTROPHILS NFR BLD AUTO: 57.9 % (ref 42.7–76)
NRBC BLD AUTO-RTO: 0 /100 WBC (ref 0–0.2)
PLATELET # BLD AUTO: 209 10*3/MM3 (ref 140–450)
POTASSIUM SERPL-SCNC: 4.8 MMOL/L (ref 3.5–5.2)
PROT SERPL-MCNC: 6.5 G/DL (ref 6–8.5)
PROTHROMBIN TIME: 13.9 SECONDS (ref 12–15.1)
RBC # BLD AUTO: 4.21 10*6/MM3 (ref 3.77–5.28)
SODIUM SERPL-SCNC: 141 MMOL/L (ref 136–145)
WBC # BLD AUTO: 8.33 10*3/MM3 (ref 3.4–10.8)

## 2020-10-15 ENCOUNTER — PREP FOR SURGERY (OUTPATIENT)
Dept: OTHER | Facility: HOSPITAL | Age: 85
End: 2020-10-15

## 2020-10-15 DIAGNOSIS — N95.0 PMB (POSTMENOPAUSAL BLEEDING): Primary | ICD-10-CM

## 2020-10-15 RX ORDER — SODIUM CHLORIDE 0.9 % (FLUSH) 0.9 %
10 SYRINGE (ML) INJECTION AS NEEDED
Status: CANCELLED | OUTPATIENT
Start: 2020-10-15

## 2020-10-15 RX ORDER — SODIUM CHLORIDE 0.9 % (FLUSH) 0.9 %
3 SYRINGE (ML) INJECTION EVERY 12 HOURS SCHEDULED
Status: CANCELLED | OUTPATIENT
Start: 2020-10-15

## 2020-11-12 ENCOUNTER — OFFICE VISIT (OUTPATIENT)
Dept: GASTROENTEROLOGY | Facility: CLINIC | Age: 85
End: 2020-11-12

## 2020-11-12 VITALS
HEIGHT: 60 IN | DIASTOLIC BLOOD PRESSURE: 60 MMHG | WEIGHT: 139 LBS | SYSTOLIC BLOOD PRESSURE: 196 MMHG | RESPIRATION RATE: 32 BRPM | BODY MASS INDEX: 27.29 KG/M2 | HEART RATE: 54 BPM | TEMPERATURE: 97.1 F

## 2020-11-12 DIAGNOSIS — R10.30 LOWER ABDOMINAL PAIN: Primary | ICD-10-CM

## 2020-11-12 DIAGNOSIS — K57.30 DIVERTICULOSIS OF COLON: ICD-10-CM

## 2020-11-12 DIAGNOSIS — K76.0 NONALCOHOLIC FATTY LIVER DISEASE: ICD-10-CM

## 2020-11-12 DIAGNOSIS — Z86.010 PERSONAL HISTORY OF COLONIC POLYPS: ICD-10-CM

## 2020-11-12 DIAGNOSIS — K52.9 CHRONIC DIARRHEA: ICD-10-CM

## 2020-11-12 DIAGNOSIS — K80.20 CALCULUS OF GALLBLADDER WITHOUT CHOLECYSTITIS WITHOUT OBSTRUCTION: ICD-10-CM

## 2020-11-12 DIAGNOSIS — D64.9 NORMOCYTIC ANEMIA: ICD-10-CM

## 2020-11-12 DIAGNOSIS — K21.9 GASTROESOPHAGEAL REFLUX DISEASE WITHOUT ESOPHAGITIS: ICD-10-CM

## 2020-11-12 DIAGNOSIS — R11.0 NAUSEA: ICD-10-CM

## 2020-11-12 PROCEDURE — 99204 OFFICE O/P NEW MOD 45 MIN: CPT | Performed by: INTERNAL MEDICINE

## 2020-11-12 RX ORDER — CHOLESTYRAMINE 4 G/9G
1 POWDER, FOR SUSPENSION ORAL 2 TIMES DAILY WITH MEALS
Qty: 60 PACKET | Refills: 2 | Status: SHIPPED | OUTPATIENT
Start: 2020-11-12 | End: 2022-08-30

## 2020-11-12 RX ORDER — DICYCLOMINE HYDROCHLORIDE 10 MG/5ML
20 SOLUTION ORAL 3 TIMES DAILY PRN
Qty: 300 ML | Refills: 1 | Status: SHIPPED | OUTPATIENT
Start: 2020-11-12 | End: 2021-12-13

## 2020-11-12 RX ORDER — FUROSEMIDE 20 MG/1
20 TABLET ORAL DAILY
COMMUNITY

## 2020-11-12 RX ORDER — DILTIAZEM HYDROCHLORIDE 240 MG/1
240 CAPSULE, COATED, EXTENDED RELEASE ORAL DAILY
COMMUNITY

## 2020-11-12 NOTE — PROGRESS NOTES
New Patient Consult      Date: 2020   Patient Name: Ninoska Gloria  MRN: [id number]  : 1931     Referring Physician: Sena Heaton MD    Chief Complaint   Patient presents with   • Diarrhea   • Abdominal Pain       History of Present Illness: Ninoska Gloria is a 89 y.o. female who is here today to establish care with Gastroenterology for evaluation of abdominal pain and diarrhea.   History of lower abdominal pain on and off for the last one year.  The pain is gradual in onset, intermittent, mild to moderate in severity and aching in nature.  Frequency being almost everyday.  Signification bloated feeling. The pain may last for few minutes to hours.  There is no radiation of abdominal pain.  Eating worsens the abdominal discomfort.  Only relieving factors of abdominal pain is after defecation. She also has occasional constipation.    She has associated diarrhea. Bowel movement anywhere 3-4 times daily, mostly soft.   Deny any vomiting or abdominal distension. But she has occasional nausea.  She has history of acid reflux on ppi low dose daily. Deny any odynophagia or dysphagia. Deny any hematochezia or melena. There is no prior history of liver or pancreatic disease. There is a history of anemia on iron pills since 20 years. No prior history of EGD. Her last colonoscopy was done 4-5 years ago and report unknown but polyps removed. No family history of colon cancer or any GI malignancy.  Denies alcohol abuse or cigarette smoking.     She had a CT scan of the abdomen pelvis done along with a CT scan of the chest done in 2020.  CT chest revealed significant bronchiectasis changes with a pneumonitis.  Abdominal CT scan revealed gallstones without any cholecystitis or CBD dilatation.  CT scan of the abdomen also revealed severe fatty liver disease and suspected cirrhosis.  It also revealed right adnexal mass for which she had a GYN consultation.  She had a transvaginal ultrasound which showed  "endometrial thickening but no ovarian lesions.   Patient is diabetic  She also has A. fib only on aspirin    Subjective      Past Medical History:   Past Medical History:   Diagnosis Date   • Anemia    • Atrial fibrillation (CMS/HCC)    • Back pain    • CHF (congestive heart failure) (CMS/HCC)    • CKD (chronic kidney disease), stage III     \"Renal failure stage 3\"   • COPD (chronic obstructive pulmonary disease) (CMS/HCC)    • Deafness    • Diabetes mellitus (CMS/HCC)    • High triglycerides    • History of blood transfusion    • Hyperlipidemia    • Hypertension    • Iron deficiency    • Myocardial infarction (CMS/HCC)    • Osteoarthritis    • Ovarian cyst    • Pneumonia    • Retinal detachment    • Shingles     about to finish treatment up    • SOB (shortness of breath)    • Vision problems        Past Surgical History:   Past Surgical History:   Procedure Laterality Date   • CATARACT EXTRACTION Bilateral ,    •  SECTION      x 5: , , , ,    • CORONARY ANGIOPLASTY WITH STENT PLACEMENT     • TUBAL ABDOMINAL LIGATION         Family History:   Family History   Problem Relation Age of Onset   • Liver cancer Brother    • Colon cancer Neg Hx    • Liver disease Neg Hx    • Cirrhosis Neg Hx        Social History:   Social History     Socioeconomic History   • Marital status:      Spouse name: Not on file   • Number of children: Not on file   • Years of education: Not on file   • Highest education level: Not on file   Social Needs   • Financial resource strain: Not hard at all   • Food insecurity     Worry: Never true     Inability: Never true   • Transportation needs     Medical: No     Non-medical: No   Tobacco Use   • Smoking status: Never Smoker   • Smokeless tobacco: Never Used   Substance and Sexual Activity   • Alcohol use: Never     Frequency: Never   • Drug use: Never   • Sexual activity: Defer   Lifestyle   • Physical activity     Days per " week: Patient refused     Minutes per session: Patient refused   • Stress: Not at all         Current Outpatient Medications:   •  acetaminophen (TYLENOL) 325 MG tablet, Take 650 mg by mouth Every 6 (Six) Hours As Needed for Mild Pain ., Disp: , Rfl:   •  aspirin 81 MG EC tablet, Take 81 mg by mouth Daily., Disp: , Rfl:   •  cetirizine (zyrTEC) 10 MG tablet, Take 10 mg by mouth Daily., Disp: , Rfl:   •  Cholecalciferol (VITAMIN D3) 5000 units capsule capsule, Take 1,000 Units by mouth Daily., Disp: , Rfl:   •  dilTIAZem CD (CARDIZEM CD) 240 MG 24 hr capsule, Take 240 mg by mouth Daily., Disp: , Rfl:   •  Flaxseed, Linseed, (FLAXSEED OIL) 1000 MG capsule, Take 2,000 mg by mouth 2 (Two) Times a Day., Disp: , Rfl:   •  furosemide (LASIX) 20 MG tablet, Take 20 mg by mouth Daily., Disp: , Rfl:   •  glipiZIDE (GLUCOTROL) 10 MG tablet, Take 20 mg by mouth 2 (Two) Times a Day Before Meals., Disp: , Rfl:   •  Insulin Detemir (LEVEMIR FLEXTOUCH SC), Inject 70 Units under the skin into the appropriate area as directed Daily., Disp: , Rfl:   •  iron polysaccharides (NIFEREX) 150 MG capsule, Take 150 mg by mouth Daily., Disp: , Rfl:   •  levothyroxine (SYNTHROID, LEVOTHROID) 100 MCG tablet, Take 100 mcg by mouth Daily., Disp: , Rfl:   •  Magnesium 500 MG capsule, Take 1 tablet by mouth Daily., Disp: , Rfl:   •  metoprolol tartrate (LOPRESSOR) 100 MG tablet, Take 100 mg by mouth 2 (Two) Times a Day., Disp: , Rfl:   •  Multiple Vitamins-Minerals (MULTIVITAMIN ADULT PO), Take  by mouth Daily., Disp: , Rfl:   •  mupirocin (BACTROBAN) 2 % ointment, As Needed., Disp: , Rfl:   •  O2 (OXYGEN), Inhale 1 (One) Time. Home therapy, Disp: , Rfl:   •  Omega-3 Fatty Acids (FISH OIL) 1000 MG capsule capsule, Take 1,200 mg by mouth 2 (Two) Times a Day With Meals., Disp: , Rfl:   •  omeprazole (priLOSEC) 20 MG capsule, Take 20 mg by mouth Daily., Disp: , Rfl:   •  simvastatin (ZOCOR) 10 MG tablet, Take 10 mg by mouth Every Night., Disp: , Rfl:    •  SITagliptin (JANUVIA) 50 MG tablet, Take 50 mg by mouth Every Night., Disp: , Rfl:   •  triamcinolone (KENALOG) 0.1 % ointment, Apply  topically Daily As Needed., Disp: , Rfl:   •  vitamin B-12 (CYANOCOBALAMIN) 1000 MCG tablet, Take 1,000 mcg by mouth 4 (Four) Times a Week., Disp: , Rfl:   •  vitamin C (ASCORBIC ACID) 500 MG tablet, Take 1,000 mg by mouth Daily., Disp: , Rfl:   •  cholestyramine (QUESTRAN) 4 g packet, Take 1 packet by mouth 2 (Two) Times a Day With Meals., Disp: 60 packet, Rfl: 2  •  dicyclomine (BENTYL) 10 MG/5ML syrup, Take 10 mL by mouth 3 (Three) Times a Day As Needed (abdominal pain)., Disp: 300 mL, Rfl: 1  •  metoprolol succinate XL (TOPROL-XL) 100 MG 24 hr tablet, , Disp: , Rfl:     No Known Allergies    Review of Systems:   Review of Systems   Constitutional: Negative for appetite change, fatigue, fever and unexpected weight loss.   HENT: Positive for trouble swallowing.    Respiratory: Positive for shortness of breath (Which is chronic). Negative for cough and wheezing.    Cardiovascular: Positive for palpitations. Negative for chest pain and leg swelling.   Gastrointestinal: Positive for abdominal distention, abdominal pain, blood in stool (black stools-Iron), constipation, diarrhea, nausea, rectal pain, GERD and indigestion. Negative for anal bleeding and vomiting.   Genitourinary: Positive for frequency. Negative for dysuria and hematuria.   Musculoskeletal: Positive for arthralgias and back pain. Negative for joint swelling.   Skin: Positive for skin lesions. Negative for color change and rash.   Neurological: Negative for dizziness, syncope, speech difficulty, weakness, light-headedness, headache and memory problem.   Hematological: Negative for adenopathy. Does not bruise/bleed easily.   Psychiatric/Behavioral: Negative for agitation, behavioral problems, suicidal ideas and depressed mood.       The following portions of the patient's history were reviewed and updated as  "appropriate: allergies, current medications, past family history, past medical history, past social history, past surgical history and problem list.    Objective     Physical Exam:  Vital Signs:   Vitals:    11/12/20 1432   BP: (!) 196/60   Pulse: 54   Resp: (!) 32   Temp: 97.1 °F (36.2 °C)   Weight: 63 kg (139 lb)   Height: 152.4 cm (60\")       Physical Exam  Vitals signs and nursing note reviewed.   Constitutional:       Appearance: Normal appearance. She is well-developed.      Comments: She is on a wheelchair alert awake and oriented   HENT:      Head: Normocephalic and atraumatic.      Right Ear: External ear normal.      Left Ear: External ear normal.      Mouth/Throat:      Mouth: Mucous membranes are moist.      Pharynx: Oropharynx is clear.   Eyes:      Conjunctiva/sclera: Conjunctivae normal.   Neck:      Musculoskeletal: Normal range of motion.      Thyroid: No thyromegaly.      Trachea: No tracheal deviation.   Cardiovascular:      Rate and Rhythm: Normal rate and regular rhythm.      Heart sounds: No murmur.   Pulmonary:      Effort: Pulmonary effort is normal. No respiratory distress.      Breath sounds: Normal breath sounds.   Abdominal:      General: Bowel sounds are normal. There is no distension.      Palpations: Abdomen is soft. There is no mass.      Tenderness: There is no abdominal tenderness.      Hernia: No hernia is present.   Musculoskeletal: Normal range of motion.   Skin:     General: Skin is warm and dry.   Neurological:      Mental Status: She is alert and oriented to person, place, and time.      Cranial Nerves: No cranial nerve deficit.      Sensory: No sensory deficit.   Psychiatric:         Mood and Affect: Mood normal.         Behavior: Behavior normal.         Thought Content: Thought content normal.         Judgment: Judgment normal.         Results Review:   I have reviewed the patient's new clinical and imaging results and agree with the interpretation.     Office Visit on " 10/01/2020   Component Date Value Ref Range Status   • WBC 10/01/2020 8.33  3.40 - 10.80 10*3/mm3 Final   • RBC 10/01/2020 4.21  3.77 - 5.28 10*6/mm3 Final   • Hemoglobin 10/01/2020 12.5  12.0 - 15.9 g/dL Final   • Hematocrit 10/01/2020 38.2  34.0 - 46.6 % Final   • MCV 10/01/2020 90.7  79.0 - 97.0 fL Final   • MCH 10/01/2020 29.7  26.6 - 33.0 pg Final   • MCHC 10/01/2020 32.7  31.5 - 35.7 g/dL Final   • RDW 10/01/2020 13.0  12.3 - 15.4 % Final   • Platelets 10/01/2020 209  140 - 450 10*3/mm3 Final   • Neutrophil Rel % 10/01/2020 57.9  42.7 - 76.0 % Final   • Lymphocyte Rel % 10/01/2020 24.7  19.6 - 45.3 % Final   • Monocyte Rel % 10/01/2020 11.9  5.0 - 12.0 % Final   • Eosinophil Rel % 10/01/2020 4.6  0.3 - 6.2 % Final   • Basophil Rel % 10/01/2020 0.5  0.0 - 1.5 % Final   • Neutrophils Absolute 10/01/2020 4.83  1.70 - 7.00 10*3/mm3 Final   • Lymphocytes Absolute 10/01/2020 2.06  0.70 - 3.10 10*3/mm3 Final   • Monocytes Absolute 10/01/2020 0.99* 0.10 - 0.90 10*3/mm3 Final   • Eosinophils Absolute 10/01/2020 0.38  0.00 - 0.40 10*3/mm3 Final   • Basophils Absolute 10/01/2020 0.04  0.00 - 0.20 10*3/mm3 Final   • Immature Granulocyte Rel % 10/01/2020 0.4  0.0 - 0.5 % Final   • Immature Grans Absolute 10/01/2020 0.03  0.00 - 0.05 10*3/mm3 Final   • nRBC 10/01/2020 0.0  0.0 - 0.2 /100 WBC Final   • Glucose 10/01/2020 89  65 - 99 mg/dL Final   • BUN 10/01/2020 33* 8 - 23 mg/dL Final   • Creatinine 10/01/2020 1.12* 0.57 - 1.00 mg/dL Final   • eGFR Non African Am 10/01/2020 46* >60 mL/min/1.73 Final    Comment: The MDRD GFR formula is only valid for adults with stable  renal function between ages 18 and 70.     • eGFR  Am 10/01/2020 56* >60 mL/min/1.73 Final   • BUN/Creatinine Ratio 10/01/2020 29.5* 7.0 - 25.0 Final   • Sodium 10/01/2020 141  136 - 145 mmol/L Final   • Potassium 10/01/2020 4.8  3.5 - 5.2 mmol/L Final   • Chloride 10/01/2020 101  98 - 107 mmol/L Final   • Total CO2 10/01/2020 29.4* 22.0 - 29.0 mmol/L  Final   • Calcium 10/01/2020 9.8  8.6 - 10.5 mg/dL Final   • Total Protein 10/01/2020 6.5  6.0 - 8.5 g/dL Final   • Albumin 10/01/2020 4.10  3.50 - 5.20 g/dL Final   • Globulin 10/01/2020 2.4  gm/dL Final   • A/G Ratio 10/01/2020 1.7  g/dL Final   • Total Bilirubin 10/01/2020 <0.2  0.0 - 1.2 mg/dL Final   • Alkaline Phosphatase 10/01/2020 106  39 - 117 U/L Final   • AST (SGOT) 10/01/2020 31  1 - 32 U/L Final   • ALT (SGPT) 10/01/2020 29  1 - 33 U/L Final   • aPTT 10/01/2020 31.8  24.5 - 37.2 seconds Final   • INR 10/01/2020 1.03  0.90 - 1.10 Final    Comment: Suggested INR therapeutic range for stable oral  anticoagulant therapy:  Low Intensity therapy:   1.5-2.0  Moderate Intensity therapy:   2.0-3.0  High Intensity therapy:   2.5-4.0     • Protime 10/01/2020 13.9  12.0 - 15.1 Seconds Final   •  10/01/2020 113.0* 0.0 - 38.1 U/mL Final    Comment: [employer] Electrochemiluminescence Immunoassay (ECLIA)  Values obtained with different assay methods or kits cannot be  used interchangeably.  Results cannot be interpreted as absolute  evidence of the presence or absence of malignant disease.     • CEA 10/01/2020 6.3  ng/mL Final   • Bilirubin, Direct 10/01/2020 <0.2  0.0 - 0.3 mg/dL Final   • Amylase 10/01/2020 72  28 - 100 U/L Final   • Lipase 10/01/2020 48  13 - 60 U/L Final      No radiology results for the last 90 days.    Assessment / Plan      Assessment & Plan:  1. Lower abdominal pain  2. Chronic diarrhea  3. Nausea  4. Diverticulosis of colon  Patient has a lower abdominal pain intermittent associated with abdominal bloating nausea and loose stools since about a year or so.   Etiology appears to be multifactorial.  Some of the symptoms are likely related to her diabetes mellitus, some of them could be associated with the medications, and she may also have some element of functional pain in the diarrhea.  No history suggestive any infective diarrhea.   She had a CAT scan of the abdomen pelvis done  in August 2020 which revealed diverticulosis of the colon otherwise unremarkable.  She also had a gallstones without any signs of cholecystitis.  There was also concern for a right-sided ovarian mass for which she had a transvaginal ultrasound which did not reveal any mass lesion.  However endometrium was thickened and she is awaiting GYN further evaluation.   She had a colonoscopy as per family about 2 to 3 years ago and had polyps removed at [facility].  Recent CBC CMP reviewed which revealed a normal WBC normal electrolytes.    We will get a stool for C. difficile colitis  I am going to start her on cholestyramine 4 g p.o. twice daily  I have also started her on Bentyl 5 mg p.o. 3 times daily as needed for abdominal pain  Will reassess in next 8 weeks time.  If her symptoms not getting better we will empirically treat her with the Flagyl for possible SIBO.   I doubt at this time colonoscopy is going to help her much.     - Clostridium Difficile Toxin, PCR - Stool, Per Rectum; Future  - US Abdomen Complete; Future    5. Nonalcoholic fatty liver disease  6.  Suspected ACUNA cirrhosis  Her recent CT abdomen pelvis done revealed severe fatty liver disease with possible cirrhosis findings.  However her lab studies not indicated of any cirrhosis.  She has normal platelets normal albumin normal total bilirubin normal PT/INR.   She may have a severe Acuna.  We will get a ultrasound abdomen to clarify the same  We will discuss on her diet and further plan after ultrasound abdomen    6. Gastroesophageal reflux disease without esophagitis  Patient is currently taking Prilosec 20 m p.o. daily with the symptom control we will continue the same    7. Normocytic anemia  Chronic history of normocytic anemia for many years and has been on iron pills.   No history of GI bleed    8. Calculus of gallbladder without cholecystitis without obstruction  Asymptomatic to monitor for now    9. Personal history of colonic polyps  Per patient and  family last colonoscopy done about 3 or 4 years ago revealed colon polyps  Given her advanced age no indication for any further colonoscopy          Follow Up:   Return in about 8 weeks (around 1/7/2021).    Marlon Dixon MD  [facility] [place]  11/12/2020  15:02 EST    Please note that portions of this note may have been completed with a voice recognition program.

## 2020-11-13 ENCOUNTER — HOSPITAL ENCOUNTER (OUTPATIENT)
Facility: HOSPITAL | Age: 85
Discharge: HOME | End: 2020-11-13
Payer: MEDICARE

## 2020-11-13 LAB
REASON FOR REJECTION: NORMAL
REJECTED TEST: NORMAL

## 2020-11-27 ENCOUNTER — HOSPITAL ENCOUNTER (OUTPATIENT)
Dept: ULTRASOUND IMAGING | Facility: HOSPITAL | Age: 85
Discharge: HOME | End: 2020-11-27
Admitting: INTERNAL MEDICINE

## 2020-11-27 DIAGNOSIS — K52.9 CHRONIC DIARRHEA: ICD-10-CM

## 2020-11-27 DIAGNOSIS — R10.30 LOWER ABDOMINAL PAIN: ICD-10-CM

## 2020-11-27 PROCEDURE — 76700 US EXAM ABDOM COMPLETE: CPT

## 2020-12-07 ENCOUNTER — OFFICE VISIT (OUTPATIENT)
Dept: CARDIOLOGY | Facility: CLINIC | Age: 85
End: 2020-12-07

## 2020-12-07 VITALS
BODY MASS INDEX: 26.81 KG/M2 | HEIGHT: 61 IN | DIASTOLIC BLOOD PRESSURE: 60 MMHG | OXYGEN SATURATION: 94 % | WEIGHT: 142 LBS | HEART RATE: 56 BPM | SYSTOLIC BLOOD PRESSURE: 146 MMHG

## 2020-12-07 DIAGNOSIS — E78.2 MIXED HYPERLIPIDEMIA: ICD-10-CM

## 2020-12-07 DIAGNOSIS — I10 ESSENTIAL HYPERTENSION: ICD-10-CM

## 2020-12-07 DIAGNOSIS — I25.10 CORONARY ARTERY DISEASE INVOLVING NATIVE CORONARY ARTERY OF NATIVE HEART WITHOUT ANGINA PECTORIS: Primary | ICD-10-CM

## 2020-12-07 DIAGNOSIS — I48.0 PAROXYSMAL ATRIAL FIBRILLATION (HCC): ICD-10-CM

## 2020-12-07 PROCEDURE — 99214 OFFICE O/P EST MOD 30 MIN: CPT | Performed by: INTERNAL MEDICINE

## 2020-12-07 PROCEDURE — 93000 ELECTROCARDIOGRAM COMPLETE: CPT | Performed by: INTERNAL MEDICINE

## 2020-12-07 NOTE — PROGRESS NOTES
[facility] at [facility]  Office Progress Note  Ninoska Gloria  2/8/1931      Visit Date: 12/07/20    PCP: Lizette Fitzpatrick, PA  [address] [address]    IDENTIFICATION: A 89 y.o. female  from [place], [place]    PROBLEM LIST:  1. CAD  1. Inferior MI 1996 s/p tpa  2. stent to RCA and LAD 1996 Dr. Velasquez  3. stress Echo 2006 - WNL  4. Echo 8/2012 - normal LV function, no significant valvular abnormalities  5. 8/16/13 MPS: Abnormal large sized severely fixed inferior defect, moderate size moderately fixed lateral defect with no reversibility, EF 45%  2. Pulmonary hypertension  1. 4/17/18 echo: EF 56%, mild concentric LVH, grade 1 diastolic dysfunction, mild calcification of the aortic valve, mild MR, mild TR, RVSP 79 mmHg, small pericardial effusion  2. CT chest - 2020 bronchiectasis  3. A-fib/flutter  1. [id number] 6  4. Hypertension  5. Dyslipidemia  6. DM 2, on insulin  1. 4/19/18 A1c 8.7  7. CKD  1. 4/18 Cr 1.2  8. Hypothyroidism  9. GERD  10. Osteoarthritis  11. History of retinal detachment  12. Frequent falls  13. Surgical history  1. 5 c section     Chief Complaint   Patient presents with   • Coronary Artery Disease   • Shortness of Breath       Allergies  No Known Allergies    Current Medications    Current Outpatient Medications:   •  acetaminophen (TYLENOL) 325 MG tablet, Take 650 mg by mouth Every 6 (Six) Hours As Needed for Mild Pain ., Disp: , Rfl:   •  aspirin 81 MG EC tablet, Take 81 mg by mouth Daily., Disp: , Rfl:   •  cetirizine (zyrTEC) 10 MG tablet, Take 10 mg by mouth Daily., Disp: , Rfl:   •  Cholecalciferol (VITAMIN D3) 5000 units capsule capsule, Take 1,000 Units by mouth Daily., Disp: , Rfl:   •  cholestyramine (QUESTRAN) 4 g packet, Take 1 packet by mouth 2 (Two) Times a Day With Meals., Disp: 60 packet, Rfl: 2  •  dicyclomine (BENTYL) 10 MG/5ML syrup, Take 10 mL by mouth 3 (Three) Times a Day As Needed (abdominal pain)., Disp: 300 mL, Rfl: 1  •  dilTIAZem CD  (CARDIZEM CD) 240 MG 24 hr capsule, Take 240 mg by mouth Daily., Disp: , Rfl:   •  Flaxseed, Linseed, (FLAXSEED OIL) 1000 MG capsule, Take 2,000 mg by mouth 2 (Two) Times a Day., Disp: , Rfl:   •  furosemide (LASIX) 20 MG tablet, Take 20 mg by mouth Daily., Disp: , Rfl:   •  glipiZIDE (GLUCOTROL) 10 MG tablet, Take 20 mg by mouth 2 (Two) Times a Day Before Meals., Disp: , Rfl:   •  Insulin Detemir (LEVEMIR FLEXTOUCH SC), Inject 70 Units under the skin into the appropriate area as directed Daily., Disp: , Rfl:   •  iron polysaccharides (NIFEREX) 150 MG capsule, Take 150 mg by mouth Daily., Disp: , Rfl:   •  levothyroxine (SYNTHROID, LEVOTHROID) 100 MCG tablet, Take 100 mcg by mouth Daily., Disp: , Rfl:   •  Magnesium 500 MG capsule, Take 1 tablet by mouth Daily., Disp: , Rfl:   •  metoprolol tartrate (LOPRESSOR) 100 MG tablet, Take 100 mg by mouth 2 (Two) Times a Day., Disp: , Rfl:   •  Multiple Vitamins-Minerals (MULTIVITAMIN ADULT PO), Take  by mouth Daily., Disp: , Rfl:   •  mupirocin (BACTROBAN) 2 % ointment, As Needed., Disp: , Rfl:   •  O2 (OXYGEN), Inhale Every Night. Home therapy , Disp: , Rfl:   •  Omega-3 Fatty Acids (FISH OIL) 1000 MG capsule capsule, Take 1,200 mg by mouth 2 (Two) Times a Day With Meals., Disp: , Rfl:   •  omeprazole (priLOSEC) 20 MG capsule, Take 20 mg by mouth Daily., Disp: , Rfl:   •  simvastatin (ZOCOR) 10 MG tablet, Take 10 mg by mouth Every Night., Disp: , Rfl:   •  SITagliptin (JANUVIA) 50 MG tablet, Take 50 mg by mouth Every Night., Disp: , Rfl:   •  triamcinolone (KENALOG) 0.1 % ointment, Apply  topically Daily As Needed., Disp: , Rfl:   •  vitamin B-12 (CYANOCOBALAMIN) 1000 MCG tablet, Take 1,000 mcg by mouth 4 (Four) Times a Week., Disp: , Rfl:   •  vitamin C (ASCORBIC ACID) 500 MG tablet, Take 1,000 mg by mouth Daily., Disp: , Rfl:       History of Present Illness   Ninoska Gloria is a 89 y.o. year old female here for follow up.  Reevaluation due to abdominal discomfort.  Was  "noted with cholelithiasis without active inflammation.  She had no overt chest discomfort or baseline shortness of breath [facility]        OBJECTIVE:  Vitals:    12/07/20 1316   BP: 146/60   BP Location: Right arm   Patient Position: Sitting   Pulse: 56   SpO2: 94%   Weight: 64.4 kg (142 lb)   Height: 154.9 cm (61\")     Physical Exam   Constitutional: She appears well-developed and well-nourished.   Neck: Normal range of motion. Neck supple. No hepatojugular reflux and no JVD present. Carotid bruit is not present. No tracheal deviation present. No thyromegaly present.   Cardiovascular: Regular rhythm, S1 normal, S2 normal, intact distal pulses and normal pulses. Bradycardia present. PMI is not displaced. Exam reveals no gallop, no distant heart sounds, no friction rub, no midsystolic click and no opening snap.   No murmur heard.  Pulses:       Radial pulses are 2+ on the right side and 2+ on the left side.        Dorsalis pedis pulses are 2+ on the right side and 2+ on the left side.        Posterior tibial pulses are 2+ on the right side and 2+ on the left side.   Pulmonary/Chest: Effort normal and breath sounds normal. She has no wheezes. She has no rales.   Abdominal: Soft. Bowel sounds are normal. She exhibits no mass. There is no abdominal tenderness. There is no guarding.       Diagnostic Data:    ECG 12 Lead    Date/Time: 12/7/2020 1:36 PM  Performed by: Teo Peoples MD  Authorized by: Teo Peoples MD   Comparison: compared with previous ECG from 5/24/2018  Similar to previous ECG  Rhythm: sinus rhythm  BPM: 56    Clinical impression: non-specific ECG              ASSESSMENT:   Diagnosis Plan   1. Coronary artery disease involving native coronary artery of native heart without angina pectoris     2. Essential hypertension     3. Mixed hyperlipidemia     4. Paroxysmal atrial fibrillation (CMS/HCC)         PLAN:  CAD post remote revascularization no anginal equivalent continued medical " management    Hypertension controlled we will decrease metoprolol to 50 twice daily due to bradycardia    Dyslipidemia on statin therapy    Paroxysmal atrial fibrillation patient defers systemic anticoagulation and no recurrence      Lizette Fitzpatrick PA, thank you for referring Ms. Gloria for evaluation.  I have forwarded my electronically generated recommendations to you for review.  Please do not hesitate to call with any questions.      Teo Peoples MD, [facility]

## 2021-01-07 ENCOUNTER — LAB (OUTPATIENT)
Dept: LAB | Facility: HOSPITAL | Age: 86
End: 2021-01-07

## 2021-01-07 ENCOUNTER — OFFICE VISIT (OUTPATIENT)
Dept: GASTROENTEROLOGY | Facility: CLINIC | Age: 86
End: 2021-01-07

## 2021-01-07 VITALS
HEIGHT: 60 IN | DIASTOLIC BLOOD PRESSURE: 68 MMHG | RESPIRATION RATE: 32 BRPM | BODY MASS INDEX: 27.73 KG/M2 | TEMPERATURE: 97.3 F | HEART RATE: 88 BPM | OXYGEN SATURATION: 93 % | SYSTOLIC BLOOD PRESSURE: 158 MMHG

## 2021-01-07 DIAGNOSIS — N95.0 PMB (POSTMENOPAUSAL BLEEDING): ICD-10-CM

## 2021-01-07 DIAGNOSIS — K76.0 FATTY (CHANGE OF) LIVER, NOT ELSEWHERE CLASSIFIED: ICD-10-CM

## 2021-01-07 DIAGNOSIS — K74.60 CIRRHOSIS OF LIVER WITHOUT ASCITES, UNSPECIFIED HEPATIC CIRRHOSIS TYPE (HCC): ICD-10-CM

## 2021-01-07 DIAGNOSIS — E78.49 OTHER HYPERLIPIDEMIA: ICD-10-CM

## 2021-01-07 DIAGNOSIS — K74.60 CIRRHOSIS OF LIVER WITHOUT ASCITES, UNSPECIFIED HEPATIC CIRRHOSIS TYPE (HCC): Primary | ICD-10-CM

## 2021-01-07 DIAGNOSIS — K52.9 CHRONIC DIARRHEA: ICD-10-CM

## 2021-01-07 DIAGNOSIS — Z11.59 ENCOUNTER FOR SCREENING FOR OTHER VIRAL DISEASES: ICD-10-CM

## 2021-01-07 LAB
BASOPHILS # BLD AUTO: 0.03 10*3/MM3 (ref 0–0.2)
BASOPHILS NFR BLD AUTO: 0.4 % (ref 0–1.5)
DEPRECATED RDW RBC AUTO: 44.8 FL (ref 37–54)
EOSINOPHIL # BLD AUTO: 0.52 10*3/MM3 (ref 0–0.4)
EOSINOPHIL NFR BLD AUTO: 6.6 % (ref 0.3–6.2)
ERYTHROCYTE [DISTWIDTH] IN BLOOD BY AUTOMATED COUNT: 13.8 % (ref 12.3–15.4)
HCT VFR BLD AUTO: 38.1 % (ref 34–46.6)
HGB BLD-MCNC: 12.7 G/DL (ref 12–15.9)
IMM GRANULOCYTES # BLD AUTO: 0.02 10*3/MM3 (ref 0–0.05)
IMM GRANULOCYTES NFR BLD AUTO: 0.3 % (ref 0–0.5)
LYMPHOCYTES # BLD AUTO: 2.24 10*3/MM3 (ref 0.7–3.1)
LYMPHOCYTES NFR BLD AUTO: 28.5 % (ref 19.6–45.3)
MCH RBC QN AUTO: 30.3 PG (ref 26.6–33)
MCHC RBC AUTO-ENTMCNC: 33.3 G/DL (ref 31.5–35.7)
MCV RBC AUTO: 90.9 FL (ref 79–97)
MONOCYTES # BLD AUTO: 1.11 10*3/MM3 (ref 0.1–0.9)
MONOCYTES NFR BLD AUTO: 14.1 % (ref 5–12)
NEUTROPHILS NFR BLD AUTO: 3.94 10*3/MM3 (ref 1.7–7)
NEUTROPHILS NFR BLD AUTO: 50.1 % (ref 42.7–76)
NRBC BLD AUTO-RTO: 0 /100 WBC (ref 0–0.2)
PLATELET # BLD AUTO: 199 10*3/MM3 (ref 140–450)
PMV BLD AUTO: 12.4 FL (ref 6–12)
RBC # BLD AUTO: 4.19 10*6/MM3 (ref 3.77–5.28)
WBC # BLD AUTO: 7.86 10*3/MM3 (ref 3.4–10.8)

## 2021-01-07 PROCEDURE — 82465 ASSAY BLD/SERUM CHOLESTEROL: CPT

## 2021-01-07 PROCEDURE — 86803 HEPATITIS C AB TEST: CPT

## 2021-01-07 PROCEDURE — 84478 ASSAY OF TRIGLYCERIDES: CPT

## 2021-01-07 PROCEDURE — 86706 HEP B SURFACE ANTIBODY: CPT

## 2021-01-07 PROCEDURE — 84450 TRANSFERASE (AST) (SGOT): CPT

## 2021-01-07 PROCEDURE — 82247 BILIRUBIN TOTAL: CPT

## 2021-01-07 PROCEDURE — 36415 COLL VENOUS BLD VENIPUNCTURE: CPT

## 2021-01-07 PROCEDURE — 87088 URINE BACTERIA CULTURE: CPT

## 2021-01-07 PROCEDURE — 83883 ASSAY NEPHELOMETRY NOT SPEC: CPT

## 2021-01-07 PROCEDURE — 87086 URINE CULTURE/COLONY COUNT: CPT

## 2021-01-07 PROCEDURE — 83516 IMMUNOASSAY NONANTIBODY: CPT

## 2021-01-07 PROCEDURE — 86038 ANTINUCLEAR ANTIBODIES: CPT

## 2021-01-07 PROCEDURE — 86708 HEPATITIS A ANTIBODY: CPT

## 2021-01-07 PROCEDURE — 83540 ASSAY OF IRON: CPT

## 2021-01-07 PROCEDURE — 82728 ASSAY OF FERRITIN: CPT

## 2021-01-07 PROCEDURE — 83010 ASSAY OF HAPTOGLOBIN QUANT: CPT

## 2021-01-07 PROCEDURE — 82172 ASSAY OF APOLIPOPROTEIN: CPT

## 2021-01-07 PROCEDURE — 81001 URINALYSIS AUTO W/SCOPE: CPT

## 2021-01-07 PROCEDURE — 87340 HEPATITIS B SURFACE AG IA: CPT

## 2021-01-07 PROCEDURE — 84460 ALANINE AMINO (ALT) (SGPT): CPT

## 2021-01-07 PROCEDURE — 85025 COMPLETE CBC W/AUTO DIFF WBC: CPT

## 2021-01-07 PROCEDURE — 82947 ASSAY GLUCOSE BLOOD QUANT: CPT

## 2021-01-07 PROCEDURE — 86704 HEP B CORE ANTIBODY TOTAL: CPT

## 2021-01-07 PROCEDURE — 99213 OFFICE O/P EST LOW 20 MIN: CPT | Performed by: INTERNAL MEDICINE

## 2021-01-07 PROCEDURE — 82977 ASSAY OF GGT: CPT

## 2021-01-07 PROCEDURE — 84466 ASSAY OF TRANSFERRIN: CPT

## 2021-01-07 PROCEDURE — 87186 SC STD MICRODIL/AGAR DIL: CPT

## 2021-01-07 RX ORDER — OFLOXACIN 3 MG/ML
SOLUTION/ DROPS OPHTHALMIC
COMMUNITY
End: 2021-03-18

## 2021-01-07 RX ORDER — PREDNISOLONE ACETATE 10 MG/ML
SUSPENSION/ DROPS OPHTHALMIC
COMMUNITY
End: 2021-03-18

## 2021-01-07 NOTE — PROGRESS NOTES
Follow Up Note     Date: 2021   Patient Name: Ninoska Gloria  MRN: [id number]  : 1931     Referring Physician: Lizette Fitzpatrick PA    Chief Complaint:    Chief Complaint   Patient presents with   • Follow-up   • Abdominal Pain   • Nausea   • Diarrhea       Interval History:   2021  Ninoska Gloria is a 89 y.o. female who is here today for follow up for diarrhea.  Her diarrhea significantly improved after she was started on cholestyramine.   Her abdominal pain also has improved now.  She is here for follow-up and also to discuss the ultrasound scan report.    2020  Ninoska Gloria is a 89 y.o. female who is here today to establish care with Gastroenterology for evaluation of abdominal pain and diarrhea.   History of lower abdominal pain on and off for the last one year.  The pain is gradual in onset, intermittent, mild to moderate in severity and aching in nature.  Frequency being almost everyday.  Signification bloated feeling. The pain may last for few minutes to hours.  There is no radiation of abdominal pain.  Eating worsens the abdominal discomfort.  Only relieving factors of abdominal pain is after defecation. She also has occasional constipation.    She has associated diarrhea. Bowel movement anywhere 3-4 times daily, mostly soft.   Deny any vomiting or abdominal distension. But she has occasional nausea.  She has history of acid reflux on ppi low dose daily. Deny any odynophagia or dysphagia. Deny any hematochezia or melena. There is no prior history of liver or pancreatic disease. There is a history of anemia on iron pills since 20 years. No prior history of EGD. Her last colonoscopy was done 4-5 years ago and report unknown but polyps removed. No family history of colon cancer or any GI malignancy.  Denies alcohol abuse or cigarette smoking.      She had a CT scan of the abdomen pelvis done along with a CT scan of the chest done in 2020.  CT chest revealed significant  "bronchiectasis changes with a pneumonitis.  Abdominal CT scan revealed gallstones without any cholecystitis or CBD dilatation.  CT scan of the abdomen also revealed severe fatty liver disease and suspected cirrhosis.  It also revealed right adnexal mass for which she had a GYN consultation.  She had a transvaginal ultrasound which showed endometrial thickening but no ovarian lesions.   Patient is diabetic  She also has A. fib only on aspirin  Subjective      Past Medical History:   Past Medical History:   Diagnosis Date   • Anemia    • Atrial fibrillation (CMS/HCC)    • Back pain    • CHF (congestive heart failure) (CMS/HCC)    • CKD (chronic kidney disease), stage III     \"Renal failure stage 3\"   • COPD (chronic obstructive pulmonary disease) (CMS/HCC)    • Deafness    • Diabetes mellitus (CMS/HCC)    • High triglycerides    • History of blood transfusion    • Hyperlipidemia    • Hypertension    • Iron deficiency    • Myocardial infarction (CMS/HCC)    • Osteoarthritis    • Ovarian cyst    • Pneumonia    • Retinal detachment    • Shingles     about to finish treatment up    • SOB (shortness of breath)    • Vision problems      Past Surgical History:   Past Surgical History:   Procedure Laterality Date   • CATARACT EXTRACTION Bilateral ,    •  SECTION      x 5: 194, , , ,    • CORONARY ANGIOPLASTY WITH STENT PLACEMENT     • TUBAL ABDOMINAL LIGATION         Family History:   Family History   Problem Relation Age of Onset   • Liver cancer Brother    • Colon cancer Neg Hx    • Liver disease Neg Hx    • Cirrhosis Neg Hx        Social History:   Social History     Socioeconomic History   • Marital status:      Spouse name: Not on file   • Number of children: Not on file   • Years of education: Not on file   • Highest education level: Not on file   Social Needs   • Financial resource strain: Not hard at all   • Food insecurity     Worry: Never true     " Inability: Never true   • Transportation needs     Medical: No     Non-medical: No   Tobacco Use   • Smoking status: Never Smoker   • Smokeless tobacco: Never Used   Substance and Sexual Activity   • Alcohol use: Never     Frequency: Never   • Drug use: Never   • Sexual activity: Defer   Lifestyle   • Physical activity     Days per week: Patient refused     Minutes per session: Patient refused   • Stress: Not at all       Medications:     Current Outpatient Medications:   •  acetaminophen (TYLENOL) 325 MG tablet, Take 650 mg by mouth Every 6 (Six) Hours As Needed for Mild Pain ., Disp: , Rfl:   •  aspirin 81 MG EC tablet, Take 81 mg by mouth Daily., Disp: , Rfl:   •  BACITRACIN-POLYMYXIN B OP, Apply  to eye(s) as directed by provider., Disp: , Rfl:   •  Calcium Carbonate Antacid (TUMS PO), Take  by mouth 2 (Two) Times a Day As Needed., Disp: , Rfl:   •  cetirizine (zyrTEC) 10 MG tablet, Take 10 mg by mouth Daily., Disp: , Rfl:   •  Cholecalciferol (VITAMIN D3) 5000 units capsule capsule, Take 1,000 Units by mouth Daily., Disp: , Rfl:   •  cholestyramine (QUESTRAN) 4 g packet, Take 1 packet by mouth 2 (Two) Times a Day With Meals. (Patient taking differently: Take 0.5 packets by mouth 2 (Two) Times a Day With Meals.), Disp: 60 packet, Rfl: 2  •  dicyclomine (BENTYL) 10 MG/5ML syrup, Take 10 mL by mouth 3 (Three) Times a Day As Needed (abdominal pain)., Disp: 300 mL, Rfl: 1  •  dilTIAZem CD (CARDIZEM CD) 240 MG 24 hr capsule, Take 240 mg by mouth Daily., Disp: , Rfl:   •  Flaxseed, Linseed, (FLAXSEED OIL) 1000 MG capsule, Take 2,000 mg by mouth 2 (Two) Times a Day., Disp: , Rfl:   •  furosemide (LASIX) 20 MG tablet, Take 20 mg by mouth Daily., Disp: , Rfl:   •  glipiZIDE (GLUCOTROL) 10 MG tablet, Take 20 mg by mouth 2 (Two) Times a Day Before Meals., Disp: , Rfl:   •  Insulin Detemir (LEVEMIR FLEXTOUCH SC), Inject 70 Units under the skin into the appropriate area as directed Daily., Disp: , Rfl:   •  iron  polysaccharides (NIFEREX) 150 MG capsule, Take 150 mg by mouth Daily., Disp: , Rfl:   •  levothyroxine (SYNTHROID, LEVOTHROID) 100 MCG tablet, Take 100 mcg by mouth Daily., Disp: , Rfl:   •  Magnesium 500 MG capsule, Take 1 tablet by mouth Daily., Disp: , Rfl:   •  metoprolol tartrate (LOPRESSOR) 100 MG tablet, Take 100 mg by mouth 2 (Two) Times a Day., Disp: , Rfl:   •  Multiple Vitamins-Minerals (MULTIVITAMIN ADULT PO), Take  by mouth Daily., Disp: , Rfl:   •  mupirocin (BACTROBAN) 2 % ointment, As Needed., Disp: , Rfl:   •  O2 (OXYGEN), Inhale Every Night. Home therapy , Disp: , Rfl:   •  ofloxacin (OCUFLOX) 0.3 % ophthalmic solution, , Disp: , Rfl:   •  Omega-3 Fatty Acids (FISH OIL) 1000 MG capsule capsule, Take 1,200 mg by mouth 2 (Two) Times a Day With Meals., Disp: , Rfl:   •  omeprazole (priLOSEC) 20 MG capsule, Take 20 mg by mouth Daily., Disp: , Rfl:   •  prednisoLONE acetate (PRED FORTE) 1 % ophthalmic suspension, , Disp: , Rfl:   •  simvastatin (ZOCOR) 10 MG tablet, Take 10 mg by mouth Every Night., Disp: , Rfl:   •  SITagliptin (JANUVIA) 50 MG tablet, Take 50 mg by mouth Every Night., Disp: , Rfl:   •  triamcinolone (KENALOG) 0.1 % ointment, Apply  topically Daily As Needed., Disp: , Rfl:   •  vitamin B-12 (CYANOCOBALAMIN) 1000 MCG tablet, Take 1,000 mcg by mouth 4 (Four) Times a Week., Disp: , Rfl:   •  vitamin C (ASCORBIC ACID) 500 MG tablet, Take 1,000 mg by mouth Daily., Disp: , Rfl:     Allergies:   No Known Allergies    Review of Systems:   Review of Systems   Constitutional: Positive for appetite change. Negative for fatigue and unexpected weight loss.   HENT: Negative for trouble swallowing.    Gastrointestinal: Positive for abdominal pain, constipation, diarrhea, GERD and indigestion. Negative for abdominal distention, anal bleeding, blood in stool, nausea, rectal pain and vomiting.       The following portions of the patient's history were reviewed and updated as appropriate: allergies,  "current medications, past family history, past medical history, past social history, past surgical history and problem list.    Objective     Physical Exam:  Vital Signs:   Vitals:    01/07/21 1324   BP: 158/68   Pulse: 88   Resp: (!) 32   Temp: 97.3 °F (36.3 °C)   SpO2: 93%   Weight: Comment: W/C   Height: 152.4 cm (60\")       Physical Exam  Vitals signs and nursing note reviewed.   Constitutional:       Appearance: She is well-developed.      Comments: Patient is in wheelchair   Eyes:      Conjunctiva/sclera: Conjunctivae normal.   Neck:      Musculoskeletal: Normal range of motion and neck supple.   Cardiovascular:      Rate and Rhythm: Normal rate and regular rhythm.      Heart sounds: No murmur.   Pulmonary:      Effort: Pulmonary effort is normal. No respiratory distress.      Breath sounds: Normal breath sounds.   Abdominal:      General: Bowel sounds are normal. There is no distension.      Palpations: Abdomen is soft. There is no mass.      Tenderness: There is no abdominal tenderness.      Hernia: No hernia is present.   Lymphadenopathy:      Cervical: No cervical adenopathy.   Skin:     General: Skin is warm and dry.   Neurological:      Mental Status: She is alert and oriented to person, place, and time.      Sensory: No sensory deficit.         Results Review:   I reviewed the patient's new clinical results.    No visits with results within 90 Day(s) from this visit.   Latest known visit with results is:   Office Visit on 10/01/2020   Component Date Value Ref Range Status   • WBC 10/01/2020 8.33  3.40 - 10.80 10*3/mm3 Final   • RBC 10/01/2020 4.21  3.77 - 5.28 10*6/mm3 Final   • Hemoglobin 10/01/2020 12.5  12.0 - 15.9 g/dL Final   • Hematocrit 10/01/2020 38.2  34.0 - 46.6 % Final   • MCV 10/01/2020 90.7  79.0 - 97.0 fL Final   • MCH 10/01/2020 29.7  26.6 - 33.0 pg Final   • MCHC 10/01/2020 32.7  31.5 - 35.7 g/dL Final   • RDW 10/01/2020 13.0  12.3 - 15.4 % Final   • Platelets 10/01/2020 209  140 - 450 " 10*3/mm3 Final   • Neutrophil Rel % 10/01/2020 57.9  42.7 - 76.0 % Final   • Lymphocyte Rel % 10/01/2020 24.7  19.6 - 45.3 % Final   • Monocyte Rel % 10/01/2020 11.9  5.0 - 12.0 % Final   • Eosinophil Rel % 10/01/2020 4.6  0.3 - 6.2 % Final   • Basophil Rel % 10/01/2020 0.5  0.0 - 1.5 % Final   • Neutrophils Absolute 10/01/2020 4.83  1.70 - 7.00 10*3/mm3 Final   • Lymphocytes Absolute 10/01/2020 2.06  0.70 - 3.10 10*3/mm3 Final   • Monocytes Absolute 10/01/2020 0.99* 0.10 - 0.90 10*3/mm3 Final   • Eosinophils Absolute 10/01/2020 0.38  0.00 - 0.40 10*3/mm3 Final   • Basophils Absolute 10/01/2020 0.04  0.00 - 0.20 10*3/mm3 Final   • Immature Granulocyte Rel % 10/01/2020 0.4  0.0 - 0.5 % Final   • Immature Grans Absolute 10/01/2020 0.03  0.00 - 0.05 10*3/mm3 Final   • nRBC 10/01/2020 0.0  0.0 - 0.2 /100 WBC Final   • Glucose 10/01/2020 89  65 - 99 mg/dL Final   • BUN 10/01/2020 33* 8 - 23 mg/dL Final   • Creatinine 10/01/2020 1.12* 0.57 - 1.00 mg/dL Final   • eGFR Non African Am 10/01/2020 46* >60 mL/min/1.73 Final    Comment: The MDRD GFR formula is only valid for adults with stable  renal function between ages 18 and 70.     • eGFR  Am 10/01/2020 56* >60 mL/min/1.73 Final   • BUN/Creatinine Ratio 10/01/2020 29.5* 7.0 - 25.0 Final   • Sodium 10/01/2020 141  136 - 145 mmol/L Final   • Potassium 10/01/2020 4.8  3.5 - 5.2 mmol/L Final   • Chloride 10/01/2020 101  98 - 107 mmol/L Final   • Total CO2 10/01/2020 29.4* 22.0 - 29.0 mmol/L Final   • Calcium 10/01/2020 9.8  8.6 - 10.5 mg/dL Final   • Total Protein 10/01/2020 6.5  6.0 - 8.5 g/dL Final   • Albumin 10/01/2020 4.10  3.50 - 5.20 g/dL Final   • Globulin 10/01/2020 2.4  gm/dL Final   • A/G Ratio 10/01/2020 1.7  g/dL Final   • Total Bilirubin 10/01/2020 <0.2  0.0 - 1.2 mg/dL Final   • Alkaline Phosphatase 10/01/2020 106  39 - 117 U/L Final   • AST (SGOT) 10/01/2020 31  1 - 32 U/L Final   • ALT (SGPT) 10/01/2020 29  1 - 33 U/L Final   • aPTT 10/01/2020 31.8  24.5  - 37.2 seconds Final   • INR 10/01/2020 1.03  0.90 - 1.10 Final    Comment: Suggested INR therapeutic range for stable oral  anticoagulant therapy:  Low Intensity therapy:   1.5-2.0  Moderate Intensity therapy:   2.0-3.0  High Intensity therapy:   2.5-4.0     • Protime 10/01/2020 13.9  12.0 - 15.1 Seconds Final   •  10/01/2020 113.0* 0.0 - 38.1 U/mL Final    Comment: [employer] Electrochemiluminescence Immunoassay (ECLIA)  Values obtained with different assay methods or kits cannot be  used interchangeably.  Results cannot be interpreted as absolute  evidence of the presence or absence of malignant disease.     • CEA 10/01/2020 6.3  ng/mL Final   • Bilirubin, Direct 10/01/2020 <0.2  0.0 - 0.3 mg/dL Final   • Amylase 10/01/2020 72  28 - 100 U/L Final   • Lipase 10/01/2020 48  13 - 60 U/L Final      Us Abdomen Complete    Result Date: 11/27/2020  1. Findings consistent with cirrhosis with no focal hepatic masses identified. 2. Gallstones.  This report was finalized on 11/27/2020 10:35 AM by Hernesto Dickinson M.D..      Assessment / Plan      1. Lower abdominal pain  2. Chronic diarrhea  3. Nausea  4. Diverticulosis of colon  1/7/2021  Patient likely has a functional pain with the diarrhea.  Her symptoms have significantly improved after she was started on cholestyramine.  She has been taking only half a packet of cholestyramine morning and evening with a good soft 1 or 2 bowel movements per day now.  She did not get her to C. difficile colitis test done. We will continue the same for now    11/12/2020   patient has a lower abdominal pain intermittent associated with abdominal bloating nausea and loose stools since about a year or so.   Etiology appears to be multifactorial.  Some of the symptoms are likely related to her diabetes mellitus, some of them could be associated with the medications, and she may also have some element of functional pain in the diarrhea.  No history suggestive any infective  diarrhea.   She had a CAT scan of the abdomen pelvis done in August 2020 which revealed diverticulosis of the colon otherwise unremarkable.  She also had a gallstones without any signs of cholecystitis.  There was also concern for a right-sided ovarian mass for which she had a transvaginal ultrasound which did not reveal any mass lesion.  However endometrium was thickened and she is awaiting GYN further evaluation.   She had a colonoscopy as per family about 2 to 3 years ago and had polyps removed at [place].  Recent CBC CMP reviewed which revealed a normal WBC normal electrolytes.  We will get a stool for C. difficile colitis  I am going to start her on cholestyramine 4 g p.o. twice daily  I have also started her on Bentyl 5 mg p.o. 3 times daily as needed for abdominal pain  Will reassess in next 8 weeks time.  If her symptoms not getting better we will empirically treat her with the Flagyl for possible SIBO.   I doubt at this time colonoscopy is going to help her much.           5. Nonalcoholic fatty liver disease  6.  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  Her recent CT abdomen pelvis done also revealed severe fatty liver disease with possible cirrhosis findings.  However her lab studies not indicated of any cirrhosis.  She has normal platelets normal albumin normal total bilirubin normal PT/INR.   She may have a severe Acuna.    Will get basic liver work-up for the etiology of cirrhosis  Low-salt diet discussed with the patient and the daughter  She needs an EGD to rule out esophageal varices.  This time her daughter wants to wait until clarify with her sister  We will review again next visit in 3 months time    Prior history  6. Gastroesophageal reflux disease without esophagitis  Patient is currently taking Prilosec 20 m p.o. daily with the symptom control we will continue the same     7.  Normocytic anemia  Chronic history of normocytic anemia for many years and has been on iron pills.   Her anemia could be related with her cirrhosis  No history of GI bleed     8. Calculus of gallbladder without cholecystitis without obstruction  Asymptomatic to monitor for now     9. Personal history of colonic polyps  Per patient and family last colonoscopy done about 3 or 4 years ago revealed colon polyps  Given her advanced age no indication for any further colonoscopy        Follow Up:   No follow-ups on file.    Marlon Dixon MD  [facility]  1/7/2021  13:27 EST     Please note that portions of this note may have been completed with a voice recognition program.

## 2021-01-07 NOTE — PATIENT INSTRUCTIONS
Cirrhosis    Cirrhosis is long-term (chronic) liver injury. The liver is the body's largest internal organ, and it performs many functions. It converts food into energy, removes toxic material from the blood, makes important proteins, and absorbs necessary vitamins from food.  In cirrhosis, healthy liver cells are replaced by scar tissue. This prevents blood from flowing through the liver, making it difficult for the liver to function. Scarring of the liver cannot be reversed, but treatment can prevent it from getting worse.  What are the causes?  Common causes of this condition are hepatitis C and long-term alcohol abuse. Other causes include:  · Nonalcoholic fatty liver disease. This happens when fat is deposited in the liver by causes other than alcohol.  · Hepatitis B infection.  · Autoimmune hepatitis. In this condition, the body's defense system (immune system) mistakenly attacks the liver cells, causing irritation and swelling (inflammation).  · Diseases that cause blockage of ducts inside the liver.  · Inherited liver diseases, such as hemochromatosis. This is one of the most common inherited liver diseases. In this disease, deposits of iron collect in the liver and other organs.  · Reactions to certain long-term medicines, such as amiodarone, a heart medicine.  · Parasitic infections. These include schistosomiasis, which is caused by a flatworm.  · Long-term contact to certain toxins. These toxins include certain organic solvents, such as toluene and chloroform.  What increases the risk?  You are more likely to develop this condition if:  · You have certain types of viral hepatitis.  · You abuse alcohol, especially if you are female.  · You are overweight.  · You share needles.  · You have unprotected sex with someone who has viral hepatitis.  What are the signs or symptoms?  You may not have any signs and symptoms at first. Symptoms may not develop until the damage to your liver starts to get worse.  Early  symptoms may include:  · Weakness and tiredness (fatigue).  · Changes in sleep patterns or having trouble sleeping.  · Itchiness.  · Tenderness in the right-upper part of your abdomen.  · Weight loss and muscle loss.  · Nausea.  · Loss of appetite.  · Appearance of tiny blood vessels under the skin.  Later symptoms may include:  · Fatigue or weakness that is getting worse.  · Yellow skin and eyes (jaundice).  · Buildup of fluid in the abdomen (ascites). You may notice that your clothes are tight around your waist.  · Weight gain.  · Swelling of the feet and ankles (edema).  · Trouble breathing.  · Easy bruising and bleeding.  · Vomiting blood.  · Black or bloody stool.  · Mental confusion.  How is this diagnosed?  Your health care provider may suspect cirrhosis based on your symptoms and medical history, especially if you have other medical conditions or a history of alcohol abuse. Your health care provider will do a physical exam to feel your liver and to check for signs of cirrhosis. He or she may perform other tests, including:  · Blood tests to check:  ? For hepatitis B or C.  ? Kidney function.  ? Liver function.  · Imaging tests such as:  ? MRI or CT scan to look for changes seen in advanced cirrhosis.  ? Ultrasound to see if normal liver tissue is being replaced by scar tissue.  · A procedure in which a long needle is used to take a sample of liver tissue to be checked in a lab (biopsy). Liver biopsy can confirm the diagnosis of cirrhosis.  How is this treated?  Treatment for this condition depends on how damaged your liver is and what caused the damage. It may include treating the symptoms of cirrhosis, or treating the underlying causes in order to slow the damage. Treatment may include:  · Making lifestyle changes, such as:  ? Eating a healthy diet. You may need to work with your health care provider or a diet and nutrition specialist (dietitian) to develop an eating plan.  ? Restricting salt  intake.  ? Maintaining a healthy weight.  ? Not abusing drugs or alcohol.  · Taking medicines to:  ? Treat liver infections or other infections.  ? Control itching.  ? Reduce fluid buildup.  ? Reduce certain blood toxins.  ? Reduce risk of bleeding from enlarged blood vessels in the stomach or esophagus (varices).  · Liver transplant. In this procedure, a liver from a donor is used to replace your diseased liver. This is done if cirrhosis has caused liver failure.  Other treatments and procedures may be done depending on the problems that you get from cirrhosis. Common problems include liver-related kidney failure (hepatorenal syndrome).  Follow these instructions at home:    · Take medicines only as told by your health care provider. Do not use medicines that are toxic to your liver. Ask your health care provider before taking any new medicines, including over-the-counter medicines.  · Rest as needed.  · Eat a well-balanced diet. Ask your health care provider or dietitian for more information.  · Limit your salt or water intake, if your health care provider asks you to do this.  · Do not drink alcohol. This is especially important if you are taking acetaminophen.  · Keep all follow-up visits as told by your health care provider. This is important.  Contact a health care provider if you:  · Have fatigue or weakness that is getting worse.  · Develop swelling of the hands, feet, legs, or face.  · Have a fever.  · Develop loss of appetite.  · Have nausea or vomiting.  · Develop jaundice.  · Develop easy bruising or bleeding.  Get help right away if you:  · Vomit bright red blood or a material that looks like coffee grounds.  · Have blood in your stools.  · Notice that your stools appear black and tarry.  · Become confused.  · Have chest pain or trouble breathing.  Summary  · Cirrhosis is chronic liver injury. Liver damage cannot be reversed. Common causes are hepatitis C and long-term alcohol abuse.  · Tests used to  diagnose cirrhosis include blood tests, imaging tests, and liver biopsy.  · Treatment for this condition involves treating the underlying cause. Avoid alcohol, drugs, salt, and medicines that may damage your liver.  · Contact your health care provider if you develop ascites, edema, jaundice, fever, nausea or vomiting, easy bruising or bleeding, or worsening fatigue.  This information is not intended to replace advice given to you by your health care provider. Make sure you discuss any questions you have with your health care provider.  Document Revised: 04/08/2020 Document Reviewed: 11/07/2018  Elsevier Patient Education © 2020 [employer]

## 2021-01-08 LAB
ACTIN IGG SERPL-ACNC: 5 UNITS (ref 0–19)
ANA SER QL: NEGATIVE
BACTERIA UR QL AUTO: ABNORMAL /HPF
BILIRUB UR QL STRIP: NEGATIVE
CLARITY UR: ABNORMAL
COLOR UR: YELLOW
FERRITIN SERPL-MCNC: 159 NG/ML (ref 13–150)
GLUCOSE UR STRIP-MCNC: NEGATIVE MG/DL
HAV AB SER QL IA: POSITIVE
HBV CORE AB SERPL QL IA: NEGATIVE
HBV SURFACE AB SER RIA-ACNC: NORMAL
HBV SURFACE AG SERPL QL IA: NORMAL
HCV AB SER DONR QL: NORMAL
HGB UR QL STRIP.AUTO: NEGATIVE
HYALINE CASTS UR QL AUTO: ABNORMAL /LPF
IRON 24H UR-MRATE: 48 MCG/DL (ref 37–145)
IRON SATN MFR SERPL: 12 % (ref 20–50)
KETONES UR QL STRIP: NEGATIVE
LEUKOCYTE ESTERASE UR QL STRIP.AUTO: ABNORMAL
MITOCHONDRIA M2 IGG SER-ACNC: <20 UNITS (ref 0–20)
NITRITE UR QL STRIP: NEGATIVE
PH UR STRIP.AUTO: 6 [PH] (ref 5–8)
PROT UR QL STRIP: ABNORMAL
RBC # UR: ABNORMAL /HPF
REF LAB TEST METHOD: ABNORMAL
SP GR UR STRIP: 1.02 (ref 1–1.03)
SQUAMOUS #/AREA URNS HPF: ABNORMAL /HPF
TIBC SERPL-MCNC: 398 MCG/DL (ref 298–536)
TRANSFERRIN SERPL-MCNC: 267 MG/DL (ref 200–360)
UROBILINOGEN UR QL STRIP: ABNORMAL
WBC UR QL AUTO: ABNORMAL /HPF

## 2021-01-10 LAB — BACTERIA SPEC AEROBE CULT: ABNORMAL

## 2021-01-11 ENCOUNTER — TELEPHONE (OUTPATIENT)
Dept: OBSTETRICS AND GYNECOLOGY | Facility: CLINIC | Age: 86
End: 2021-01-11

## 2021-01-11 ENCOUNTER — TELEPHONE (OUTPATIENT)
Dept: GASTROENTEROLOGY | Facility: CLINIC | Age: 86
End: 2021-01-11

## 2021-01-11 LAB
A2 MACROGLOB SERPL-MCNC: 468 MG/DL (ref 110–276)
ALT SERPL W P-5'-P-CCNC: 29 IU/L (ref 0–40)
APO A-I SERPL-MCNC: 151 MG/DL (ref 114–214)
AST SERPL W P-5'-P-CCNC: 38 IU/L (ref 0–40)
BILIRUB SERPL-MCNC: 0.1 MG/DL (ref 0–1.2)
CHOLEST SERPL-MCNC: 230 MG/DL (ref 100–199)
FIBROSIS SCORING:: ABNORMAL
FIBROSIS STAGE SERPL QL: ABNORMAL
GGT SERPL-CCNC: 125 IU/L (ref 0–60)
GLUCOSE SERPL-MCNC: 69 MG/DL (ref 65–99)
HAPTOGLOB SERPL-MCNC: 89 MG/DL (ref 41–333)
INTERPRETATIONS: (REFERENCE): ABNORMAL
LABORATORY COMMENT REPORT: ABNORMAL
LIVER FIBR SCORE SERPL CALC.FIBROSURE: 0.69 (ref 0–0.21)
NASH SCORING (REFERENCE): ABNORMAL
NECROINFLAMMATORY ACT GRADE SERPL QL: ABNORMAL
NECROINFLAMMATORY ACT SCORE SERPL: 0.75
SERVICE CMNT-IMP: ABNORMAL
STEATOSIS GRADE (REFERENCE): ABNORMAL
STEATOSIS GRADING (REFERENCE): ABNORMAL
STEATOSIS SCORE (REFERENCE): 0.77 (ref 0–0.3)
TRIGL SERPL-MCNC: 514 MG/DL (ref 0–149)

## 2021-01-11 NOTE — TELEPHONE ENCOUNTER
----- Message from Sena Heaton MD sent at 1/11/2021  8:57 AM EST -----  Need to contact patient and see if she is on antibiotics.  If not she will need to be placed on them.

## 2021-01-11 NOTE — TELEPHONE ENCOUNTER
"----- Message from Marlon Dixon MD sent at 1/10/2021  7:53 PM EST -----    She is immune to hepatitis A, nothing to do for now.   She is not immune to hepatitic B, at her age I don't even recommend' any hep B vaccine      ----- Message -----  From: America Buitrago MA  Sent: 1/8/2021   2:32 PM EST  To: Marlon Dixon MD    Patient's daughter Virginia called.  They have been contacted by the [facility] Regarding her Hep A total ab +.  They do not recall a vaccine.  They are concerned with \"Does she have the active Hep A?  They would like a call if there is something they need to do.    America      "

## 2021-01-12 RX ORDER — NITROFURANTOIN 25; 75 MG/1; MG/1
100 CAPSULE ORAL 2 TIMES DAILY
Qty: 14 CAPSULE | Refills: 0 | Status: SHIPPED | OUTPATIENT
Start: 2021-01-12 | End: 2021-01-19

## 2021-01-12 NOTE — TELEPHONE ENCOUNTER
Okay to inform patient I sent in a prescription for antibiotics.  Patient needs to repeat a urine culture after completion of her antibiotics.

## 2021-01-14 DIAGNOSIS — K74.60 CIRRHOSIS OF LIVER WITHOUT ASCITES, UNSPECIFIED HEPATIC CIRRHOSIS TYPE (HCC): Primary | ICD-10-CM

## 2021-01-14 RX ORDER — SODIUM CHLORIDE 9 MG/ML
70 INJECTION, SOLUTION INTRAVENOUS CONTINUOUS PRN
Status: CANCELLED | OUTPATIENT
Start: 2021-01-14

## 2021-01-15 ENCOUNTER — TELEPHONE (OUTPATIENT)
Dept: GASTROENTEROLOGY | Facility: CLINIC | Age: 86
End: 2021-01-15

## 2021-01-15 PROBLEM — K74.60 CIRRHOSIS OF LIVER WITHOUT ASCITES (HCC): Status: ACTIVE | Noted: 2021-01-15

## 2021-01-15 NOTE — TELEPHONE ENCOUNTER
Patient's daughter called 01/13/21 and stated patient wanted to proceed with EGD.  I did check with Dr. ZARAGOZA.  He says this is okay.  Patient scheduled.  (See scanned instructions in chart).  Instructions mailed to patient.

## 2021-01-26 ENCOUNTER — TRANSCRIBE ORDERS (OUTPATIENT)
Dept: LAB | Facility: HOSPITAL | Age: 86
End: 2021-01-26

## 2021-01-26 ENCOUNTER — LAB (OUTPATIENT)
Dept: LAB | Facility: HOSPITAL | Age: 86
End: 2021-01-26

## 2021-01-26 DIAGNOSIS — Z01.818 PRE-OPERATIVE CLEARANCE: Primary | ICD-10-CM

## 2021-01-26 DIAGNOSIS — Z11.59 ENCOUNTER FOR SCREENING FOR OTHER VIRAL DISEASES: ICD-10-CM

## 2021-01-26 DIAGNOSIS — Z01.818 PRE-OPERATIVE CLEARANCE: ICD-10-CM

## 2021-01-26 PROCEDURE — U0004 COV-19 TEST NON-CDC HGH THRU: HCPCS

## 2021-01-26 PROCEDURE — C9803 HOPD COVID-19 SPEC COLLECT: HCPCS

## 2021-01-27 LAB — SARS-COV-2 RNA RESP QL NAA+PROBE: NOT DETECTED

## 2021-01-27 RX ORDER — MUPIROCIN CALCIUM 20 MG/G
1 CREAM TOPICAL 3 TIMES DAILY PRN
COMMUNITY
End: 2022-12-19

## 2021-01-29 ENCOUNTER — ANESTHESIA EVENT (OUTPATIENT)
Dept: GASTROENTEROLOGY | Facility: HOSPITAL | Age: 86
End: 2021-01-29

## 2021-01-29 ENCOUNTER — HOSPITAL ENCOUNTER (OUTPATIENT)
Facility: HOSPITAL | Age: 86
Setting detail: HOSPITAL OUTPATIENT SURGERY
Discharge: HOME | End: 2021-01-29
Attending: INTERNAL MEDICINE | Admitting: INTERNAL MEDICINE

## 2021-01-29 ENCOUNTER — ANESTHESIA (OUTPATIENT)
Dept: GASTROENTEROLOGY | Facility: HOSPITAL | Age: 86
End: 2021-01-29

## 2021-01-29 VITALS
SYSTOLIC BLOOD PRESSURE: 197 MMHG | WEIGHT: 138 LBS | HEART RATE: 62 BPM | DIASTOLIC BLOOD PRESSURE: 60 MMHG | BODY MASS INDEX: 27.82 KG/M2 | HEIGHT: 59 IN | TEMPERATURE: 97 F | RESPIRATION RATE: 16 BRPM | OXYGEN SATURATION: 100 %

## 2021-01-29 DIAGNOSIS — K74.60 CIRRHOSIS OF LIVER WITHOUT ASCITES, UNSPECIFIED HEPATIC CIRRHOSIS TYPE (HCC): ICD-10-CM

## 2021-01-29 LAB — GLUCOSE BLDC GLUCOMTR-MCNC: 146 MG/DL (ref 70–130)

## 2021-01-29 PROCEDURE — 43239 EGD BIOPSY SINGLE/MULTIPLE: CPT | Performed by: INTERNAL MEDICINE

## 2021-01-29 PROCEDURE — 25010000002 PROPOFOL 1000 MG/100ML EMULSION: Performed by: NURSE ANESTHETIST, CERTIFIED REGISTERED

## 2021-01-29 PROCEDURE — 82962 GLUCOSE BLOOD TEST: CPT

## 2021-01-29 RX ORDER — SODIUM CHLORIDE 9 MG/ML
70 INJECTION, SOLUTION INTRAVENOUS CONTINUOUS PRN
Status: DISCONTINUED | OUTPATIENT
Start: 2021-01-29 | End: 2021-01-29 | Stop reason: HOSPADM

## 2021-01-29 RX ORDER — PROPOFOL 10 MG/ML
INJECTION, EMULSION INTRAVENOUS AS NEEDED
Status: DISCONTINUED | OUTPATIENT
Start: 2021-01-29 | End: 2021-01-29 | Stop reason: SURG

## 2021-01-29 RX ORDER — LIDOCAINE HYDROCHLORIDE 20 MG/ML
INJECTION, SOLUTION INTRAVENOUS AS NEEDED
Status: DISCONTINUED | OUTPATIENT
Start: 2021-01-29 | End: 2021-01-29 | Stop reason: SURG

## 2021-01-29 RX ORDER — SODIUM CHLORIDE 0.9 % (FLUSH) 0.9 %
10 SYRINGE (ML) INJECTION AS NEEDED
Status: DISCONTINUED | OUTPATIENT
Start: 2021-01-29 | End: 2021-01-29 | Stop reason: HOSPADM

## 2021-01-29 RX ORDER — KETAMINE HYDROCHLORIDE 50 MG/ML
INJECTION, SOLUTION, CONCENTRATE INTRAMUSCULAR; INTRAVENOUS AS NEEDED
Status: DISCONTINUED | OUTPATIENT
Start: 2021-01-29 | End: 2021-01-29 | Stop reason: SURG

## 2021-01-29 RX ADMIN — SODIUM CHLORIDE 70 ML/HR: 9 INJECTION, SOLUTION INTRAVENOUS at 10:34

## 2021-01-29 RX ADMIN — LIDOCAINE HYDROCHLORIDE 100 MG: 20 INJECTION, SOLUTION INTRAVENOUS at 12:32

## 2021-01-29 RX ADMIN — KETAMINE HYDROCHLORIDE 25 MG: 50 INJECTION, SOLUTION INTRAMUSCULAR; INTRAVENOUS at 12:32

## 2021-01-29 RX ADMIN — PROPOFOL 50 MG: 10 INJECTION, EMULSION INTRAVENOUS at 12:37

## 2021-01-29 NOTE — ANESTHESIA PREPROCEDURE EVALUATION
Anesthesia Evaluation     Patient summary reviewed and Nursing notes reviewed   no history of anesthetic complications:  NPO Solid Status: > 8 hours  NPO Liquid Status: > 8 hours           Airway   Mallampati: II  TM distance: >3 FB  Neck ROM: full  no difficulty expected  Dental - normal exam     Pulmonary - normal exam   (+) pneumonia resolved , COPD, shortness of breath,   Cardiovascular - normal exam    Rhythm: regular  Rate: normal    (+) hypertension, past MI , CAD, dysrhythmias Atrial Fib, CHF , hyperlipidemia,       Neuro/Psych  (+) headaches, psychiatric history Anxiety and Depression,     GI/Hepatic/Renal/Endo    (+)  GERD,  liver disease fatty liver disease, renal disease CRI, diabetes mellitus type 2,     Musculoskeletal     (+) back pain,   Abdominal    Substance History - negative use     OB/GYN negative ob/gyn ROS         Other   arthritis,    history of cancer                    Anesthesia Plan    ASA 3     MAC   (Pt told that intravenous sedation will be used as the primary anesthetic along with local anesthesia if necessary. Every effort will be made to make sure the patient is comfortable.     The patient was told they may or may not have recall for the procedure. It was further explained that if the MAC was not adequate that a general anesthetic with either an LMA or endotracheal tube would be required.     Will proceed with the plan of care.)  intravenous induction     Anesthetic plan, all risks, benefits, and alternatives have been provided, discussed and informed consent has been obtained with: patient.

## 2021-01-29 NOTE — ANESTHESIA POSTPROCEDURE EVALUATION
Patient: Ninoska Gloria    Procedure Summary     Date: 01/29/21 Room / Location: [facility] END[facility]    Anesthesia Start: 1228 Anesthesia Stop: 1245    Procedure: ESOPHAGOGASTRODUODENOSCOPY WITH BIOPSY (N/A ) Diagnosis:       Cirrhosis of liver without ascites, unspecified hepatic cirrhosis type (CMS/HCC)      (Cirrhosis of liver without ascites, unspecified hepatic cirrhosis type (CMS/HCC) [K74.60])    Surgeon: Marlon Dixon MD Provider: Shayne Snider CRNA    Anesthesia Type: MAC ASA Status: 3          Anesthesia Type: MAC    Vitals  Vitals Value Taken Time   /60 01/29/21 1300   Temp 97 °F (36.1 °C) 01/29/21 1245   Pulse 60 01/29/21 1245   Resp 16 01/29/21 1245   SpO2 100 % 01/29/21 1245           Post Anesthesia Care and Evaluation    Patient location during evaluation: PHASE II  Patient participation: complete - patient participated  Level of consciousness: awake  Pain score: 1  Pain management: adequate  Airway patency: patent  Anesthetic complications: No anesthetic complications  PONV Status: controlled  Cardiovascular status: acceptable and stable  Respiratory status: acceptable  Hydration status: acceptable

## 2021-02-03 LAB
LAB AP CASE REPORT: NORMAL
PATH REPORT.FINAL DX SPEC: NORMAL

## 2021-03-18 ENCOUNTER — PREP FOR SURGERY (OUTPATIENT)
Dept: OTHER | Facility: HOSPITAL | Age: 86
End: 2021-03-18

## 2021-03-18 ENCOUNTER — OFFICE VISIT (OUTPATIENT)
Dept: OBSTETRICS AND GYNECOLOGY | Facility: CLINIC | Age: 86
End: 2021-03-18

## 2021-03-18 VITALS
WEIGHT: 138 LBS | HEIGHT: 59 IN | SYSTOLIC BLOOD PRESSURE: 142 MMHG | DIASTOLIC BLOOD PRESSURE: 68 MMHG | BODY MASS INDEX: 27.82 KG/M2

## 2021-03-18 DIAGNOSIS — R93.5 ABNORMAL ULTRASOUND OF ENDOMETRIUM: Primary | ICD-10-CM

## 2021-03-18 DIAGNOSIS — N95.0 PMB (POSTMENOPAUSAL BLEEDING): Primary | ICD-10-CM

## 2021-03-18 DIAGNOSIS — R19.7 DIARRHEA, UNSPECIFIED TYPE: ICD-10-CM

## 2021-03-18 DIAGNOSIS — R10.2 PELVIC PAIN: ICD-10-CM

## 2021-03-18 PROCEDURE — 99214 OFFICE O/P EST MOD 30 MIN: CPT | Performed by: OBSTETRICS & GYNECOLOGY

## 2021-03-18 RX ORDER — SODIUM CHLORIDE 0.9 % (FLUSH) 0.9 %
10 SYRINGE (ML) INJECTION AS NEEDED
Status: CANCELLED | OUTPATIENT
Start: 2021-03-18

## 2021-03-18 RX ORDER — SODIUM CHLORIDE 0.9 % (FLUSH) 0.9 %
3 SYRINGE (ML) INJECTION EVERY 12 HOURS SCHEDULED
Status: CANCELLED | OUTPATIENT
Start: 2021-03-18

## 2021-03-18 RX ORDER — NITROGLYCERIN 0.4 MG/1
TABLET SUBLINGUAL
COMMUNITY
Start: 2021-02-03

## 2021-03-19 PROBLEM — N95.0 PMB (POSTMENOPAUSAL BLEEDING): Status: ACTIVE | Noted: 2021-03-19

## 2021-03-19 NOTE — PROGRESS NOTES
"Chief Complaint  Follow-up (Follow up thickened endometrium, discuss results. )     History of Present Illness:  Patient is 89 y.o.  who presents to [facility] here for follow-up evaluation of her thickened endometrium.  The patient had been seen in October.  She was noted to have a markedly thickened endometrium of 19 mm.  Patient was having significant abdominal pain as well as diarrhea at that time.  Patient did see the gastroenterologist.  Patient did have an EGD.  Patient was given medication for diarrhea.  Patient reports some improvement in her symptoms.  She has continued however to have diarrhea.  Patient continues to have lower abdominal and pelvic pain as noted.  She denies any vaginal bleeding or discharge.  She is not had any further imaging in regards to her uterus since her last visit.    Physical Examination:  Vital Signs: /68   Ht 149.9 cm (59\")   Wt 62.6 kg (138 lb)   BMI 27.87 kg/m²     General Appearance: alert, appears stated age, and cooperative  Breasts: Not performed.  Abdomen: no masses, no hepatomegaly, no splenomegaly, soft non-tender, no guarding and no rebound tenderness  Pelvic: Not performed.    Data Review:  The following data was reviewed by: Sena Heaton MD on 2021:  { Labs  Result Review  Imaging  Med Tab  Media :23}   Labs:    Imaging:  US Non-ob Transvaginal (2021 16:05)    Medical Records:  None    Assessment and Plan   Problem List Items Addressed This Visit     None      Visit Diagnoses     Abnormal ultrasound of endometrium    -  Primary  Patient with continued thickened endometrium is noted.  I discussed with the patient as well as her daughter the need for endometrial sampling.  I discussed with the patient endometrial biopsy versus D&C.  I have discussed with the patient and her daughter the various possibilities for a thickened endometrium.  The patient desires to schedule D&C with diagnostic hysteroscopy for " further evaluation.  I discussed with the patient and her daughter the risk, complications, benefits, as well as other alternatives.  Patient desires to schedule as noted.    Relevant Orders    US Non-ob Transvaginal (Completed)    Diarrhea, unspecified type      Patient with continued diarrhea although this is improved.  Patient did see the gastroenterologist as noted.  Instructions and precautions are given.  Patient is to follow-up with her gastroenterologist as discussed.    Pelvic pain      Patient with continued pelvic pain and cramping as noted.  Repeat transvaginal ultrasound is obtained today.  The patient and her daughter have been informed regarding those findings.  Patient desires to schedule D&C with diagnostic hysteroscopy for further evaluation.  Plan pending results.          Follow Up/Instructions:    Patient was given instructions and counseling regarding her condition or for health maintenance advice. Please see specific information pulled into the AVS if appropriate.     Note: Speech recognition transcription software may have been used to dictate portions of this document.  An attempt at proofreading has been made though minor errors in transcription may still be present.    This note was electronically signed.  Sena Heaton M.D.

## 2021-04-15 ENCOUNTER — TELEPHONE (OUTPATIENT)
Dept: GASTROENTEROLOGY | Facility: CLINIC | Age: 86
End: 2021-04-15

## 2021-04-15 NOTE — TELEPHONE ENCOUNTER
1st attempt:  Called patient's daughter re: overdue stool test.  Patient is not having diarrhea all the time, only on occasion.  Will cancel the testing and patient's family can let us know if there is a problem.

## 2021-12-13 ENCOUNTER — OFFICE VISIT (OUTPATIENT)
Dept: CARDIOLOGY | Facility: CLINIC | Age: 86
End: 2021-12-13

## 2021-12-13 VITALS
DIASTOLIC BLOOD PRESSURE: 60 MMHG | SYSTOLIC BLOOD PRESSURE: 138 MMHG | WEIGHT: 137 LBS | HEIGHT: 59 IN | HEART RATE: 60 BPM | OXYGEN SATURATION: 96 % | BODY MASS INDEX: 27.62 KG/M2

## 2021-12-13 DIAGNOSIS — I25.10 CORONARY ARTERY DISEASE INVOLVING NATIVE CORONARY ARTERY OF NATIVE HEART WITHOUT ANGINA PECTORIS: Primary | ICD-10-CM

## 2021-12-13 DIAGNOSIS — E78.2 MIXED HYPERLIPIDEMIA: ICD-10-CM

## 2021-12-13 DIAGNOSIS — I10 PRIMARY HYPERTENSION: ICD-10-CM

## 2021-12-13 PROCEDURE — 99214 OFFICE O/P EST MOD 30 MIN: CPT | Performed by: INTERNAL MEDICINE

## 2021-12-13 RX ORDER — HYDRALAZINE HYDROCHLORIDE 10 MG/1
10 TABLET, FILM COATED ORAL 2 TIMES DAILY
COMMUNITY

## 2021-12-13 NOTE — PROGRESS NOTES
[facility]  Office Progress Note  Ninoska Gloria  2/8/1931  [address] [address]       Visit Date: 12/13/21    PCP: Lizette Fitzpatrick, PA  [address] [address]    IDENTIFICATION: A 89 y.o. female from [place], [place]    PROBLEM LIST:   1. CAD  1. Inferior MI 1996 s/p tpa  2. stent to RCA and LAD 1996 Dr. Velasquez  3. stress Echo 2006 - WNL  4. Echo 8/2012 - normal LV function, no significant valvular abnormalities  5. 8/16/13 MPS: Abnormal large sized severely fixed inferior defect, moderate size moderately fixed lateral defect with no reversibility, EF 45%  2. Pulmonary hypertension  1. 4/17/18 echo: EF 56%, mild concentric LVH, grade 1 diastolic dysfunction, mild calcification of the aortic valve, mild MR, mild TR, RVSP 79 mmHg, small pericardial effusion  2. CT chest - 2020 bronchiectasis  3. A-fib/flutter  1. [id number] 6 no AC due to falls  4. Hypertension  5. Dyslipidemia  6. DM 2, on insulin  1. 4/19/18 A1c 8.7  7. CKD  1. 4/18 Cr 1.2  8. Hypothyroidism  9. GERD  10. Osteoarthritis  11. History of retinal detachment  12. Frequent falls  13. Diverticulosis  14. ACUNA  15. Cholelithiasis  16. Surgical history  1. 5 c section       CC:   Chief Complaint   Patient presents with   • Coronary Artery Disease       Allergies  No Known Allergies    Current Medications    Current Outpatient Medications:   •  acetaminophen (TYLENOL) 325 MG tablet, Take 650 mg by mouth Every 6 (Six) Hours As Needed for Mild Pain ., Disp: , Rfl:   •  aspirin 81 MG EC tablet, Take 81 mg by mouth Daily., Disp: , Rfl:   •  Calcium Carbonate Antacid (TUMS PO), Take 1 tablet by mouth 2 (Two) Times a Day As Needed (GERD)., Disp: , Rfl:   •  cetirizine (zyrTEC) 10 MG tablet, Take 10 mg by mouth Daily., Disp: , Rfl:   •  Cholecalciferol (VITAMIN D3) 5000 units capsule capsule, Take 1,000 Units by mouth Daily., Disp: , Rfl:   •  cholestyramine (QUESTRAN) 4 g packet, Take 1 packet by  mouth 2 (Two) Times a Day With Meals. (Patient taking differently: Take 0.5 packets by mouth 2 (Two) Times a Day With Meals.), Disp: 60 packet, Rfl: 2  •  dilTIAZem CD (CARDIZEM CD) 240 MG 24 hr capsule, Take 240 mg by mouth Daily., Disp: , Rfl:   •  Flaxseed, Linseed, (FLAXSEED OIL) 1000 MG capsule, Take 2,000 mg by mouth 2 (Two) Times a Day., Disp: , Rfl:   •  furosemide (LASIX) 20 MG tablet, Take 20 mg by mouth Daily., Disp: , Rfl:   •  glipiZIDE (GLUCOTROL) 10 MG tablet, Take 20 mg by mouth 2 (Two) Times a Day Before Meals., Disp: , Rfl:   •  hydrALAZINE (APRESOLINE) 10 MG tablet, Take 25 mg by mouth 2 (Two) Times a Day., Disp: , Rfl:   •  Insulin Detemir (LEVEMIR FLEXTOUCH SC), Inject 70 Units under the skin into the appropriate area as directed Daily., Disp: , Rfl:   •  iron polysaccharides (NIFEREX) 150 MG capsule, Take 150 mg by mouth Daily., Disp: , Rfl:   •  levothyroxine (SYNTHROID, LEVOTHROID) 100 MCG tablet, Take 100 mcg by mouth Daily., Disp: , Rfl:   •  Magnesium 500 MG capsule, Take 1 tablet by mouth Daily., Disp: , Rfl:   •  metoprolol tartrate (LOPRESSOR) 100 MG tablet, Take 100 mg by mouth 2 (Two) Times a Day., Disp: , Rfl:   •  Multiple Vitamins-Minerals (MULTIVITAMIN ADULT PO), Take 1 tablet by mouth Daily., Disp: , Rfl:   •  mupirocin (BACTROBAN) 2 % cream, Apply 1 application topically to the appropriate area as directed 3 (Three) Times a Day As Needed (skin irritation/pre-cancerous lesions)., Disp: , Rfl:   •  nitroglycerin (NITROSTAT) 0.4 MG SL tablet, , Disp: , Rfl:   •  O2 (OXYGEN), Inhale 2 L/min Every Night. Home therapy , Disp: , Rfl:   •  Omega-3 Fatty Acids (FISH OIL) 1000 MG capsule capsule, Take 1,200 mg by mouth 2 (Two) Times a Day With Meals., Disp: , Rfl:   •  omeprazole (priLOSEC) 20 MG capsule, Take 20 mg by mouth Daily., Disp: , Rfl:   •  simvastatin (ZOCOR) 10 MG tablet, Take 10 mg by mouth Every Night., Disp: , Rfl:   •  SITagliptin (JANUVIA) 50 MG tablet, Take 50 mg by  "mouth Every Night., Disp: , Rfl:   •  triamcinolone (KENALOG) 0.1 % ointment, Apply 1 application topically to the appropriate area as directed Daily As Needed for Irritation or Rash., Disp: , Rfl:   •  vitamin B-12 (CYANOCOBALAMIN) 1000 MCG tablet, Take 1,000 mcg by mouth 4 (Four) Times a Week., Disp: , Rfl:   •  vitamin C (ASCORBIC ACID) 500 MG tablet, Take 1,000 mg by mouth Daily., Disp: , Rfl:       History of Present Illness   Ninoska Gloria is a 89 y.o. year old female here for follow up.    Pt denies any chest pain,  orthopnea, PND, palpitations, lower extremity edema, or claudication.  She continues with some shortness of breath but does limited activities.  Her family states that they do spot check her oxygen level and is typically above 90%      OBJECTIVE:  Vitals:    12/13/21 1137   BP: 138/60   BP Location: Right arm   Patient Position: Sitting   Pulse: 60   SpO2: 96%   Weight: 62.1 kg (137 lb)   Height: 149.9 cm (59\")     Body mass index is 27.67 kg/m².    Constitutional:       Appearance: Healthy appearance. Not in distress.   Neck:      Vascular: No JVR. JVD normal.   Pulmonary:      Effort: Pulmonary effort is normal.      Breath sounds: Normal breath sounds. No wheezing. No rhonchi. No rales.   Chest:      Chest wall: Not tender to palpatation.   Cardiovascular:      PMI at left midclavicular line. Normal rate. Regular rhythm. Normal S1. Normal S2.      Murmurs: There is a systolic murmur.      No gallop. No click. No rub.   Pulses:     Intact distal pulses.   Edema:     Peripheral edema absent.   Abdominal:      General: Bowel sounds are normal.      Palpations: Abdomen is soft.      Tenderness: There is no abdominal tenderness.   Musculoskeletal: Normal range of motion.         General: No tenderness. Skin:     General: Skin is warm and dry.   Neurological:      General: No focal deficit present.      Mental Status: Alert and oriented to person, place and time.         Diagnostic " Data:  Procedures      ASSESSMENT:   Diagnosis Plan   1. Coronary artery disease involving native coronary artery of native heart without angina pectoris     2. Primary hypertension     3. Mixed hyperlipidemia         PLAN:  CAD post remote PCI and stenting no anginal COVID continue medical manage    Hypertension both essential and pulmonary continued medical      Dyslipidemia controlled on statin therapy            Teo Peoples MD, FACC

## 2022-03-17 ENCOUNTER — HOSPITAL ENCOUNTER (EMERGENCY)
Facility: HOSPITAL | Age: 87
Discharge: HOME | End: 2022-03-18
Attending: FAMILY MEDICINE
Payer: MEDICARE

## 2022-03-17 ENCOUNTER — APPOINTMENT (OUTPATIENT)
Dept: GENERAL RADIOLOGY | Facility: HOSPITAL | Age: 87
End: 2022-03-17
Payer: MEDICARE

## 2022-03-17 DIAGNOSIS — I50.9 ACUTE CONGESTIVE HEART FAILURE, UNSPECIFIED HEART FAILURE TYPE (HCC): Primary | ICD-10-CM

## 2022-03-17 DIAGNOSIS — R06.02 SHORTNESS OF BREATH: ICD-10-CM

## 2022-03-17 LAB
ANION GAP SERPL CALCULATED.3IONS-SCNC: 10 MMOL/L (ref 3–16)
BASOPHILS ABSOLUTE: 0 K/UL (ref 0–0.1)
BASOPHILS RELATIVE PERCENT: 0.3 %
BUN BLDV-MCNC: 30 MG/DL (ref 6–20)
CALCIUM SERPL-MCNC: 9.3 MG/DL (ref 8.5–10.5)
CHLORIDE BLD-SCNC: 101 MMOL/L (ref 98–107)
CO2: 28 MMOL/L (ref 20–30)
CREAT SERPL-MCNC: 1.3 MG/DL (ref 0.4–1.2)
EOSINOPHILS ABSOLUTE: 0.3 K/UL (ref 0–0.4)
EOSINOPHILS RELATIVE PERCENT: 4.1 %
GFR AFRICAN AMERICAN: 46
GFR NON-AFRICAN AMERICAN: 38
GLUCOSE BLD-MCNC: 148 MG/DL (ref 74–106)
HCT VFR BLD CALC: 38.7 % (ref 37–47)
HEMOGLOBIN: 12.4 G/DL (ref 11.5–16.5)
IMMATURE GRANULOCYTES #: 0 K/UL
IMMATURE GRANULOCYTES %: 0.3 % (ref 0–5)
LYMPHOCYTES ABSOLUTE: 1.9 K/UL (ref 1.5–4)
LYMPHOCYTES RELATIVE PERCENT: 27.9 %
MCH RBC QN AUTO: 29.9 PG (ref 27–32)
MCHC RBC AUTO-ENTMCNC: 32 G/DL (ref 31–35)
MCV RBC AUTO: 93.3 FL (ref 80–100)
MONOCYTES ABSOLUTE: 1.1 K/UL (ref 0.2–0.8)
MONOCYTES RELATIVE PERCENT: 15.9 %
NEUTROPHILS ABSOLUTE: 3.6 K/UL (ref 2–7.5)
NEUTROPHILS RELATIVE PERCENT: 51.5 %
PDW BLD-RTO: 13.2 % (ref 11–16)
PLATELET # BLD: 192 K/UL (ref 150–400)
PMV BLD AUTO: 11.1 FL (ref 6–10)
POTASSIUM REFLEX MAGNESIUM: 4.2 MMOL/L (ref 3.4–5.1)
PRO-BNP: 2112 PG/ML (ref 0–1800)
RBC # BLD: 4.15 M/UL (ref 3.8–5.8)
SODIUM BLD-SCNC: 139 MMOL/L (ref 136–145)
TROPONIN: <0.3 NG/ML
WBC # BLD: 6.9 K/UL (ref 4–11)

## 2022-03-17 PROCEDURE — 36415 COLL VENOUS BLD VENIPUNCTURE: CPT

## 2022-03-17 PROCEDURE — 96374 THER/PROPH/DIAG INJ IV PUSH: CPT

## 2022-03-17 PROCEDURE — 80048 BASIC METABOLIC PNL TOTAL CA: CPT

## 2022-03-17 PROCEDURE — 93005 ELECTROCARDIOGRAM TRACING: CPT

## 2022-03-17 PROCEDURE — 71045 X-RAY EXAM CHEST 1 VIEW: CPT

## 2022-03-17 PROCEDURE — 96375 TX/PRO/DX INJ NEW DRUG ADDON: CPT

## 2022-03-17 PROCEDURE — 99283 EMERGENCY DEPT VISIT LOW MDM: CPT

## 2022-03-17 PROCEDURE — 6360000002 HC RX W HCPCS: Performed by: FAMILY MEDICINE

## 2022-03-17 PROCEDURE — 85025 COMPLETE CBC W/AUTO DIFF WBC: CPT

## 2022-03-17 PROCEDURE — 83880 ASSAY OF NATRIURETIC PEPTIDE: CPT

## 2022-03-17 PROCEDURE — 84484 ASSAY OF TROPONIN QUANT: CPT

## 2022-03-17 RX ORDER — HYDRALAZINE HYDROCHLORIDE 20 MG/ML
10 INJECTION INTRAMUSCULAR; INTRAVENOUS ONCE
Status: COMPLETED | OUTPATIENT
Start: 2022-03-17 | End: 2022-03-17

## 2022-03-17 RX ORDER — OMEPRAZOLE 20 MG/1
20 CAPSULE, DELAYED RELEASE ORAL DAILY
COMMUNITY

## 2022-03-17 RX ORDER — MAGNESIUM 30 MG
500 TABLET ORAL 2 TIMES DAILY
COMMUNITY

## 2022-03-17 RX ORDER — FUROSEMIDE 10 MG/ML
20 INJECTION INTRAMUSCULAR; INTRAVENOUS ONCE
Status: COMPLETED | OUTPATIENT
Start: 2022-03-17 | End: 2022-03-17

## 2022-03-17 RX ORDER — HYDRALAZINE HYDROCHLORIDE 20 MG/ML
10 INJECTION INTRAMUSCULAR; INTRAVENOUS ONCE
Status: DISCONTINUED | OUTPATIENT
Start: 2022-03-17 | End: 2022-03-17

## 2022-03-17 RX ORDER — HYDRALAZINE HYDROCHLORIDE 25 MG/1
25 TABLET, FILM COATED ORAL 2 TIMES DAILY
COMMUNITY

## 2022-03-17 RX ADMIN — HYDRALAZINE HYDROCHLORIDE 10 MG: 20 INJECTION, SOLUTION INTRAMUSCULAR; INTRAVENOUS at 23:14

## 2022-03-17 RX ADMIN — FUROSEMIDE 20 MG: 10 INJECTION, SOLUTION INTRAVENOUS at 23:13

## 2022-03-17 ASSESSMENT — ENCOUNTER SYMPTOMS
COUGH: 1
SHORTNESS OF BREATH: 1

## 2022-03-17 NOTE — ED PROVIDER NOTES
[address] [place] [address] Court  eMERGENCY dEPARTMENT eNCOUnter      Pt Name: Calos Leach  MRN: [id number]  Emilygfflorecita 2/8/1931  Date of evaluation: 3/17/2022  Provider: Elio Pena DO    CHIEF COMPLAINT       Chief Complaint   Patient presents with    Shortness of Breath     Wears oxygen at night; admits SOA is worse at night.  Edema         HISTORY OF PRESENT ILLNESS   (Location/Symptom, Timing/Onset, Context/Setting, Quality, Duration, Modifying Factors, Severity)  Note limiting factors. Calos Leach is a 80 y.o. female who presents to the emergency department for having increased shortness of breath. Pt states that she has been having trouble getting her air and having some anxiety over not feeling like she can breathe. Pt wears oxygen at night. She has been wearing it for past couple years. She had low pulse oxygenation testing done. Pt went to see Primary Care today and they sent her for CXR, told her that she looked like she had fluid \"around her heart\". Told her they would start her on diuretics and medications or she could go to the Emergency Room and she decide to come here. No chest pain. Pt says she just feels full in her chest. Been having on and off leg swelling, worse over the past couple days. Her primary care told her to wear her oxygen all day now. No fever. Had COVID testing done and was negative. Urine testing at clinic negative. No abdominal pain, no n/v/d. Pt with intermittent nonproductive cough. No sick contacts. States that she feels worse when she lays down. History of being on water pills for couple years. Daughter with her and her and her sister alternates staying 1-2 weeks at a time with her. No heart palpitations. Pt has been having elevated blood pressure lately. Nursing Notes were reviewed. REVIEW OF SYSTEMS    (2-9 systems for level 4, 10 or more forlevel 5)     Review of Systems   Constitutional: Positive for activity change.  Negative for unexpected weight change. Respiratory: Positive for cough and shortness of breath. Cardiovascular: Positive for leg swelling. All other systems reviewed and are negative. PAST MEDICAL HISTORY     Past Medical History:   Diagnosis Date    Arthritis     Atrial fibrillation ([facility]     Chronic kidney disease     Diabetes mellitus ([facility]     GERD (gastroesophageal reflux disease)     Hyperlipidemia     Hypertension     MI (myocardial infarction) ([facility] [facility]     Thyroid disease          SURGICAL HISTORY       Past Surgical History:   Procedure Laterality Date    APPENDECTOMY      CATARACT REMOVAL       SECTION      x5    EYE SURGERY      retina detachment.           CURRENT MEDICATIONS       Discharge Medication List as of 3/17/2022 11:30 PM      CONTINUE these medications which have NOT CHANGED    Details   omeprazole (PRILOSEC) 20 MG delayed release capsule Take 20 mg by mouth dailyHistorical Med      hydrALAZINE (APRESOLINE) 25 MG tablet Take 25 mg by mouth 2 times dailyHistorical Med      magnesium 30 MG tablet Take 500 mg by mouth 2 times dailyHistorical Med      acetaminophen (TYLENOL) 325 MG tablet Take 650 mg by mouth as neededHistorical Med      aspirin EC 81 MG EC tablet Take 81 mg by mouth dailyHistorical Med      Cholecalciferol (VITAMIN D3) 5000 units CAPS Take 1,000 Units by mouth dailyHistorical Med      diltiazem (CARDIZEM CD) 120 MG extended release capsule Take 240 mg by mouth dailyHistorical Med      Flaxseed, Linseed, (FLAXSEED OIL) 1000 MG CAPS Take 2,000 mg by mouth 2 times dailyHistorical Med      furosemide (LASIX) 40 MG tablet Take 20 mg by mouth daily Historical Med      glipiZIDE (GLUCOTROL) 10 MG tablet Take 20 mg by mouth 2 times dailyHistorical Med      insulin detemir (LEVEMIR) 100 UNIT/ML injection vial Inject 70 Units into the skin daily Historical Med      iron polysaccharides (NIFEREX) 150 MG capsule Take 150 mg by mouth dailyHistorical Med      levothyroxine (SYNTHROID) 88 MCG tablet Take 100 mcg by mouth daily Historical Med      metoprolol tartrate (LOPRESSOR) 50 MG tablet Take 100 mg by mouth 2 times dailyHistorical Med      Multiple Vitamins-Minerals (MULTIVITAMIN ADULT PO) Take 1 tablet by mouth dailyHistorical Med      Omega-3 Fatty Acids (FISH OIL) 1000 MG CAPS Take 1,200 mg by mouth 2 times dailyHistorical Med      simvastatin (ZOCOR) 10 MG tablet Take 10 mg by mouth dailyHistorical Med      SITagliptin (JANUVIA) 50 MG tablet Take 50 mg by mouth dailyHistorical Med      triamcinolone (KENALOG) 0.1 % ointment Apply topically as needed, Topical, PRN, Historical Med      vitamin B-12 (CYANOCOBALAMIN) 1000 MCG tablet Take 1,000 mcg by mouth See Admin Instructions Sun, Tue, Thurs, SatHistorical Med      vitamin C (ASCORBIC ACID) 500 MG tablet Take 1,000 mg by mouth dailyHistorical Med             ALLERGIES     Patient has no known allergies. FAMILY HISTORY     No family history on file. SOCIAL HISTORY       Social History     Socioeconomic History    Marital status:      Spouse name: Not on file    Number of children: Not on file    Years of education: Not on file    Highest education level: Not on file   Occupational History    Not on file   Tobacco Use    Smoking status: Never Smoker    Smokeless tobacco: Never Used   Substance and Sexual Activity    Alcohol use: No    Drug use: No    Sexual activity: Not on file   Other Topics Concern    Not on file   Social History Narrative    Not on file     Social Determinants of Health     Financial Resource Strain:     Difficulty of Paying Living Expenses: Not on file   Food Insecurity:     Worried About Running Out of Food in the Last Year: Not on file    Mario of Food in the Last Year: Not on file   Transportation Needs:     Lack of Transportation (Medical): Not on file    Lack of Transportation (Non-Medical):  Not on file   Physical Activity:     Days of Exercise per Week: Not on file  Minutes of Exercise per Session: Not on file   Stress:     Feeling of Stress : Not on file   Social Connections:     Frequency of Communication with Friends and Family: Not on file    Frequency of Social Gatherings with Friends and Family: Not on file    Attends [employer] Services: Not on file    Active Member of [address] [place] [address] or Organizations: Not on file    Attends Club or Organization Meetings: Not on file    Marital Status: Not on file   Intimate Partner Violence:     Fear of Current or Ex-Partner: Not on file    Emotionally Abused: Not on file    Physically Abused: Not on file    Sexually Abused: Not on file   Housing Stability:     Unable to Pay for Housing in the Last Year: Not on file    Number of [place] in the Last Year: Not on file    Unstable Housing in the Last Year: Not on file       SCREENINGS    Estela Coma Scale  Eye Opening: Spontaneous  Best Verbal Response: Oriented  Best Motor Response: Obeys commands  Estela Coma Scale Score: 15        PHYSICAL EXAM    (up to 7 for level 4, 8 or more for level 5)     ED Triage Vitals   BP Temp Temp Source Pulse Resp SpO2 Height Weight   03/17/22 1910 03/17/22 1909 03/17/22 1909 03/17/22 1740 03/17/22 1740 03/17/22 1740 03/17/22 1909 03/17/22 1909   (!) 200/59 98.3 °F (36.8 °C) Oral 55 16 95 % 5' (1.524 m) 136 lb (61.7 kg)       Physical Exam  Vitals and nursing note reviewed. Constitutional:       General: She is not in acute distress. Appearance: She is well-developed. She is not toxic-appearing or diaphoretic. HENT:      Head: Normocephalic. Eyes:      Extraocular Movements: Extraocular movements intact. Pupils: Pupils are equal, round, and reactive to light. Cardiovascular:      Rate and Rhythm: Regular rhythm. Bradycardia present. Pulmonary:      Effort: Tachypnea present. Breath sounds: Rales present.       Comments: Pt with diffuse posterior bibasilar rales and crackles to the anterior upper chest to auscultation  Musculoskeletal:      Cervical back: Neck supple. Right lower leg: Edema present. Left lower leg: Edema present. Comments: Pt with mild + 1 lower extremity edema over the ankles and dorsum of feet   Skin:     General: Skin is warm and dry. Neurological:      Mental Status: She is alert and oriented to person, place, and time. Comments: Extreme hard of hearing   Psychiatric:         Mood and Affect: Mood normal.         Behavior: Behavior normal.         DIAGNOSTIC RESULTS     EKG: All EKG's are interpreted by the Emergency Department Physician who either signs or Co-signs this chart in the absence of a cardiologist.    HR 59; Sinus Rhythm; Normal  ms; Normal QRS 96 ms; No acute ST elevation; No significant ST depression; Normal axis; No extrasystoles    RADIOLOGY:   Non-plain film images such as CT, Ultrasound and MRI are read by the radiologist. Plain radiographic images are visualized andpreliminarily interpreted by the emergency physician with the below findings:    CXR - See Below    Interpretationper the Radiologist below, if available at the time of this note:    XR CHEST PORTABLE   Final Result      Mild interstitial edema and small left pleural effusion.                ED BEDSIDE ULTRASOUND:   Performed by ED Physician - none    LABS:  Labs Reviewed   CBC WITH AUTO DIFFERENTIAL - Abnormal; Notable for the following components:       Result Value    MPV 11.1 (*)     Monocytes Absolute 1.1 (*)     All other components within normal limits   BASIC METABOLIC PANEL W/ REFLEX TO MG FOR LOW K - Abnormal; Notable for the following components:    Glucose 148 (*)     BUN 30 (*)     CREATININE 1.3 (*)     GFR Non- 38 (*)     GFR  46 (*)     All other components within normal limits   BRAIN NATRIURETIC PEPTIDE - Abnormal; Notable for the following components:    Pro-BNP 2,112 (*)     All other components within normal limits   TROPONIN       All other labs were within normal range or not returned as of this dictation. EMERGENCY DEPARTMENT COURSE and DIFFERENTIAL DIAGNOSIS/MDM:   Vitals:    Vitals:    03/17/22 2332 03/17/22 2347 03/18/22 0002 03/18/22 0016   BP: (!) 147/72 (!) 166/56 (!) 155/62 (!) 155/62   Pulse: 60 60 65 64   Resp: 19 27 27 20   Temp:       TempSrc:       SpO2: 95% 96% 94% 94%   Weight:       Height:               CRITICAL CARE TIME   Total Critical Care time was 0 minutes, excluding separatelyreportable procedures. There was a high probability ofclinically significant/life threatening deterioration in the patient's condition which required my urgent intervention. CONSULTS:  None    PROCEDURES:  None    PROGRESS NOTES:    Pt with hypertension, 200/ 70 with slight bradycardia that needs addressed. Will give Hydralazine opal not to give med to decrease HR. Pt noted to have small LLL effusion and probable lower lung interstitial edema, cardiomegaly. Symptoms consistent with CHF. Will place on 2 L oxygen. Get labs, opal BNP, renal function, give dose of Lasix while here. Will review the Cardiology visits and see if she has any recent echocardiograms  Pt with edema for CXR. BNP 2,100. Talked with family and the patient about her symptoms. No real oxygen requirement and sat 94% or better. Minimal tachypnea. Pt with slight edema to legs. Would be best to give dose of Lasix IV, then increase her Lasix to 40 mg daily for next couple days. Pt to return if symptoms continue to worsen. BNP 2,112 with negative troponin. FINAL IMPRESSION      1. Acute congestive heart failure, unspecified heart failure type ([address] New Problem   2. Shortness of breath New Problem         DISPOSITION/PLAN   DISPOSITION Decision To Discharge 03/17/2022 11:28:10 PM      PATIENT REFERRED TO:    Follow up with Primary Care in next 1 week if symptoms improve  Pt to return if symptoms worsen as discussed.  Pt to increase Lasix to 40 mg in the morning for next 4 days.          DISCHARGE MEDICATIONS:  Discharge Medication List as of 3/17/2022 11:30 PM          (Please note that portions of this note were completed with a voice recognition program.  Efforts were made to edit the dictations but occasionallywords are mis-transcribed.)    Muna Fournier DO (electronically signed)  Attending Emergency Physician          Muna Fournier DO  03/19/22 [id number]

## 2022-03-17 NOTE — ED TRIAGE NOTES
Pt. Was seen at her Dr's office today and was told she had fluid around her heart,pt. Also has swelling to bilat.feet/ankles.

## 2022-03-17 NOTE — ED NOTES
Was checked for COVID at PCP this date. Family advised that test was negative.       Tabitha Ibarra  03/17/22 1910

## 2022-03-18 VITALS
RESPIRATION RATE: 20 BRPM | DIASTOLIC BLOOD PRESSURE: 62 MMHG | HEIGHT: 60 IN | TEMPERATURE: 98.3 F | BODY MASS INDEX: 26.7 KG/M2 | WEIGHT: 136 LBS | OXYGEN SATURATION: 94 % | SYSTOLIC BLOOD PRESSURE: 155 MMHG | HEART RATE: 64 BPM

## 2022-03-18 NOTE — ED NOTES
Pt left ED in wheelchair with belongings at this time. Pt and family member verbalized understanding of discharge instructions.       Westley Youngblood RN  03/18/22 0020

## 2022-08-19 ENCOUNTER — APPOINTMENT (OUTPATIENT)
Dept: GENERAL RADIOLOGY | Facility: HOSPITAL | Age: 87
End: 2022-08-19

## 2022-08-19 ENCOUNTER — APPOINTMENT (OUTPATIENT)
Dept: CT IMAGING | Facility: HOSPITAL | Age: 87
End: 2022-08-19

## 2022-08-19 ENCOUNTER — HOSPITAL ENCOUNTER (OUTPATIENT)
Facility: HOSPITAL | Age: 87
Setting detail: OBSERVATION
LOS: 1 days | Discharge: HOME | End: 2022-08-21
Attending: EMERGENCY MEDICINE | Admitting: INTERNAL MEDICINE
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
## 2022-08-19 DIAGNOSIS — Z86.79 HISTORY OF CORONARY ARTERY DISEASE: ICD-10-CM

## 2022-08-19 DIAGNOSIS — R41.0 CONFUSION: ICD-10-CM

## 2022-08-19 DIAGNOSIS — I48.0 PAROXYSMAL ATRIAL FIBRILLATION: ICD-10-CM

## 2022-08-19 DIAGNOSIS — R41.82 ALTERED MENTAL STATUS, UNSPECIFIED ALTERED MENTAL STATUS TYPE: Primary | ICD-10-CM

## 2022-08-19 DIAGNOSIS — Z86.19 HISTORY OF INFECTION DUE TO DRUG-RESISTANT ORGANISM: ICD-10-CM

## 2022-08-19 DIAGNOSIS — N39.0 RECURRENT UTI: ICD-10-CM

## 2022-08-19 DIAGNOSIS — N18.30 STAGE 3 CHRONIC KIDNEY DISEASE, UNSPECIFIED WHETHER STAGE 3A OR 3B CKD: ICD-10-CM

## 2022-08-19 DIAGNOSIS — S20.219A CONTUSION OF RIB, UNSPECIFIED LATERALITY, INITIAL ENCOUNTER: ICD-10-CM

## 2022-08-19 DIAGNOSIS — Z86.39 HISTORY OF DIABETES MELLITUS: ICD-10-CM

## 2022-08-19 PROBLEM — K21.9 GERD WITHOUT ESOPHAGITIS: Status: ACTIVE | Noted: 2022-08-19

## 2022-08-19 PROBLEM — F03.90 DEMENTIA: Status: ACTIVE | Noted: 2022-08-19

## 2022-08-19 PROBLEM — N18.32 STAGE 3B CHRONIC KIDNEY DISEASE: Status: ACTIVE | Noted: 2022-08-19

## 2022-08-19 PROBLEM — I50.32 CHRONIC DIASTOLIC CHF (CONGESTIVE HEART FAILURE) (HCC): Status: ACTIVE | Noted: 2022-08-19

## 2022-08-19 LAB
ALBUMIN SERPL-MCNC: 3.9 G/DL (ref 3.5–5.2)
ALBUMIN/GLOB SERPL: 1.1 G/DL
ALP SERPL-CCNC: 100 U/L (ref 39–117)
ALT SERPL W P-5'-P-CCNC: 24 U/L (ref 1–33)
ANION GAP SERPL CALCULATED.3IONS-SCNC: 13 MMOL/L (ref 5–15)
AST SERPL-CCNC: 30 U/L (ref 1–32)
BACTERIA UR QL AUTO: ABNORMAL /HPF
BASOPHILS # BLD AUTO: 0.02 10*3/MM3 (ref 0–0.2)
BASOPHILS NFR BLD AUTO: 0.2 % (ref 0–1.5)
BILIRUB SERPL-MCNC: 0.2 MG/DL (ref 0–1.2)
BILIRUB UR QL STRIP: NEGATIVE
BUN SERPL-MCNC: 36 MG/DL (ref 8–23)
BUN/CREAT SERPL: 25.4 (ref 7–25)
CALCIUM SPEC-SCNC: 9.8 MG/DL (ref 8.2–9.6)
CHLORIDE SERPL-SCNC: 105 MMOL/L (ref 98–107)
CK SERPL-CCNC: 95 U/L (ref 20–180)
CLARITY UR: CLEAR
CO2 SERPL-SCNC: 23 MMOL/L (ref 22–29)
COLOR UR: YELLOW
CREAT SERPL-MCNC: 1.42 MG/DL (ref 0.57–1)
D-LACTATE SERPL-SCNC: 1 MMOL/L (ref 0.5–2)
DEPRECATED RDW RBC AUTO: 51.6 FL (ref 37–54)
EGFRCR SERPLBLD CKD-EPI 2021: 35 ML/MIN/1.73
EOSINOPHIL # BLD AUTO: 0.15 10*3/MM3 (ref 0–0.4)
EOSINOPHIL NFR BLD AUTO: 1.5 % (ref 0.3–6.2)
ERYTHROCYTE [DISTWIDTH] IN BLOOD BY AUTOMATED COUNT: 15.6 % (ref 12.3–15.4)
FLUAV RNA RESP QL NAA+PROBE: NOT DETECTED
FLUBV RNA RESP QL NAA+PROBE: NOT DETECTED
GLOBULIN UR ELPH-MCNC: 3.4 GM/DL
GLUCOSE SERPL-MCNC: 99 MG/DL (ref 65–99)
GLUCOSE UR STRIP-MCNC: NEGATIVE MG/DL
HCT VFR BLD AUTO: 37 % (ref 34–46.6)
HGB BLD-MCNC: 12.1 G/DL (ref 12–15.9)
HGB UR QL STRIP.AUTO: NEGATIVE
HOLD SPECIMEN: NORMAL
HYALINE CASTS UR QL AUTO: ABNORMAL /LPF
IMM GRANULOCYTES # BLD AUTO: 0.03 10*3/MM3 (ref 0–0.05)
IMM GRANULOCYTES NFR BLD AUTO: 0.3 % (ref 0–0.5)
KETONES UR QL STRIP: NEGATIVE
LDH SERPL-CCNC: 256 U/L (ref 135–214)
LEUKOCYTE ESTERASE UR QL STRIP.AUTO: ABNORMAL
LIPASE SERPL-CCNC: 40 U/L (ref 13–60)
LYMPHOCYTES # BLD AUTO: 1.77 10*3/MM3 (ref 0.7–3.1)
LYMPHOCYTES NFR BLD AUTO: 18.2 % (ref 19.6–45.3)
MCH RBC QN AUTO: 29.3 PG (ref 26.6–33)
MCHC RBC AUTO-ENTMCNC: 32.7 G/DL (ref 31.5–35.7)
MCV RBC AUTO: 89.6 FL (ref 79–97)
MONOCYTES # BLD AUTO: 1.14 10*3/MM3 (ref 0.1–0.9)
MONOCYTES NFR BLD AUTO: 11.7 % (ref 5–12)
NEUTROPHILS NFR BLD AUTO: 6.62 10*3/MM3 (ref 1.7–7)
NEUTROPHILS NFR BLD AUTO: 68.1 % (ref 42.7–76)
NITRITE UR QL STRIP: NEGATIVE
NRBC BLD AUTO-RTO: 0 /100 WBC (ref 0–0.2)
PH UR STRIP.AUTO: 5.5 [PH] (ref 5–8)
PLATELET # BLD AUTO: 233 10*3/MM3 (ref 140–450)
PMV BLD AUTO: 11.2 FL (ref 6–12)
POTASSIUM SERPL-SCNC: 4.1 MMOL/L (ref 3.5–5.2)
PROCALCITONIN SERPL-MCNC: 0.09 NG/ML (ref 0–0.25)
PROT SERPL-MCNC: 7.3 G/DL (ref 6–8.5)
PROT UR QL STRIP: ABNORMAL
RBC # BLD AUTO: 4.13 10*6/MM3 (ref 3.77–5.28)
RBC # UR STRIP: ABNORMAL /HPF
REF LAB TEST METHOD: ABNORMAL
SARS-COV-2 RNA RESP QL NAA+PROBE: NOT DETECTED
SODIUM SERPL-SCNC: 141 MMOL/L (ref 136–145)
SP GR UR STRIP: 1.02 (ref 1–1.03)
SQUAMOUS #/AREA URNS HPF: ABNORMAL /HPF
UROBILINOGEN UR QL STRIP: ABNORMAL
WBC # UR STRIP: ABNORMAL /HPF
WBC NRBC COR # BLD: 9.73 10*3/MM3 (ref 3.4–10.8)
WHOLE BLOOD HOLD COAG: NORMAL
WHOLE BLOOD HOLD SPECIMEN: NORMAL

## 2022-08-19 PROCEDURE — 36415 COLL VENOUS BLD VENIPUNCTURE: CPT

## 2022-08-19 PROCEDURE — 99219 PR INITIAL OBSERVATION CARE/DAY 50 MINUTES: CPT | Performed by: INTERNAL MEDICINE

## 2022-08-19 PROCEDURE — 81001 URINALYSIS AUTO W/SCOPE: CPT | Performed by: PHYSICIAN ASSISTANT

## 2022-08-19 PROCEDURE — 87040 BLOOD CULTURE FOR BACTERIA: CPT | Performed by: PHYSICIAN ASSISTANT

## 2022-08-19 PROCEDURE — 85025 COMPLETE CBC W/AUTO DIFF WBC: CPT

## 2022-08-19 PROCEDURE — 84145 PROCALCITONIN (PCT): CPT | Performed by: PHYSICIAN ASSISTANT

## 2022-08-19 PROCEDURE — 87077 CULTURE AEROBIC IDENTIFY: CPT | Performed by: PHYSICIAN ASSISTANT

## 2022-08-19 PROCEDURE — 80053 COMPREHEN METABOLIC PANEL: CPT

## 2022-08-19 PROCEDURE — 70450 CT HEAD/BRAIN W/O DYE: CPT

## 2022-08-19 PROCEDURE — 87186 SC STD MICRODIL/AGAR DIL: CPT | Performed by: PHYSICIAN ASSISTANT

## 2022-08-19 PROCEDURE — 82550 ASSAY OF CK (CPK): CPT | Performed by: PHYSICIAN ASSISTANT

## 2022-08-19 PROCEDURE — 25010000002 PIPERACILLIN SOD-TAZOBACTAM PER 1 G: Performed by: PHYSICIAN ASSISTANT

## 2022-08-19 PROCEDURE — 99284 EMERGENCY DEPT VISIT MOD MDM: CPT

## 2022-08-19 PROCEDURE — 96361 HYDRATE IV INFUSION ADD-ON: CPT

## 2022-08-19 PROCEDURE — 71110 X-RAY EXAM RIBS BIL 3 VIEWS: CPT

## 2022-08-19 PROCEDURE — 83615 LACTATE (LD) (LDH) ENZYME: CPT | Performed by: PHYSICIAN ASSISTANT

## 2022-08-19 PROCEDURE — 83605 ASSAY OF LACTIC ACID: CPT

## 2022-08-19 PROCEDURE — 87636 SARSCOV2 & INF A&B AMP PRB: CPT | Performed by: PHYSICIAN ASSISTANT

## 2022-08-19 PROCEDURE — 96365 THER/PROPH/DIAG IV INF INIT: CPT

## 2022-08-19 PROCEDURE — 87086 URINE CULTURE/COLONY COUNT: CPT | Performed by: PHYSICIAN ASSISTANT

## 2022-08-19 PROCEDURE — 83690 ASSAY OF LIPASE: CPT

## 2022-08-19 RX ORDER — SODIUM CHLORIDE 9 MG/ML
10 INJECTION INTRAVENOUS AS NEEDED
Status: DISCONTINUED | OUTPATIENT
Start: 2022-08-19 | End: 2022-08-21 | Stop reason: HOSPADM

## 2022-08-19 RX ORDER — DEXTROSE MONOHYDRATE 25 G/50ML
25 INJECTION, SOLUTION INTRAVENOUS
Status: DISCONTINUED | OUTPATIENT
Start: 2022-08-19 | End: 2022-08-21 | Stop reason: HOSPADM

## 2022-08-19 RX ORDER — DILTIAZEM HYDROCHLORIDE 240 MG/1
240 CAPSULE, COATED, EXTENDED RELEASE ORAL DAILY
Status: DISCONTINUED | OUTPATIENT
Start: 2022-08-20 | End: 2022-08-21 | Stop reason: HOSPADM

## 2022-08-19 RX ORDER — ACETAMINOPHEN 325 MG/1
650 TABLET ORAL EVERY 6 HOURS PRN
Status: DISCONTINUED | OUTPATIENT
Start: 2022-08-19 | End: 2022-08-21 | Stop reason: HOSPADM

## 2022-08-19 RX ORDER — PANTOPRAZOLE SODIUM 40 MG/1
40 TABLET, DELAYED RELEASE ORAL DAILY
COMMUNITY

## 2022-08-19 RX ORDER — SODIUM CHLORIDE 0.9 % (FLUSH) 0.9 %
10 SYRINGE (ML) INJECTION EVERY 12 HOURS SCHEDULED
Status: DISCONTINUED | OUTPATIENT
Start: 2022-08-20 | End: 2022-08-21 | Stop reason: HOSPADM

## 2022-08-19 RX ORDER — HEPARIN SODIUM 5000 [USP'U]/ML
5000 INJECTION, SOLUTION INTRAVENOUS; SUBCUTANEOUS EVERY 8 HOURS SCHEDULED
Status: DISCONTINUED | OUTPATIENT
Start: 2022-08-20 | End: 2022-08-21 | Stop reason: HOSPADM

## 2022-08-19 RX ORDER — PANTOPRAZOLE SODIUM 40 MG/1
40 TABLET, DELAYED RELEASE ORAL NIGHTLY
Status: DISCONTINUED | OUTPATIENT
Start: 2022-08-19 | End: 2022-08-21 | Stop reason: HOSPADM

## 2022-08-19 RX ORDER — MULTIPLE VITAMINS W/ MINERALS TAB 9MG-400MCG
1 TAB ORAL DAILY
Status: DISCONTINUED | OUTPATIENT
Start: 2022-08-20 | End: 2022-08-21 | Stop reason: HOSPADM

## 2022-08-19 RX ORDER — HYDRALAZINE HYDROCHLORIDE 25 MG/1
25 TABLET, FILM COATED ORAL 2 TIMES DAILY
Status: DISCONTINUED | OUTPATIENT
Start: 2022-08-20 | End: 2022-08-20

## 2022-08-19 RX ORDER — ASPIRIN 81 MG/1
81 TABLET ORAL DAILY
Status: DISCONTINUED | OUTPATIENT
Start: 2022-08-20 | End: 2022-08-21 | Stop reason: HOSPADM

## 2022-08-19 RX ORDER — INSULIN LISPRO 100 [IU]/ML
0-7 INJECTION, SOLUTION INTRAVENOUS; SUBCUTANEOUS
Status: DISCONTINUED | OUTPATIENT
Start: 2022-08-20 | End: 2022-08-20

## 2022-08-19 RX ORDER — NICOTINE POLACRILEX 4 MG
15 LOZENGE BUCCAL
Status: DISCONTINUED | OUTPATIENT
Start: 2022-08-19 | End: 2022-08-21 | Stop reason: HOSPADM

## 2022-08-19 RX ORDER — SODIUM CHLORIDE 9 MG/ML
75 INJECTION, SOLUTION INTRAVENOUS CONTINUOUS
Status: DISCONTINUED | OUTPATIENT
Start: 2022-08-20 | End: 2022-08-20

## 2022-08-19 RX ORDER — ATORVASTATIN CALCIUM 10 MG/1
10 TABLET, FILM COATED ORAL NIGHTLY
Status: DISCONTINUED | OUTPATIENT
Start: 2022-08-19 | End: 2022-08-21 | Stop reason: HOSPADM

## 2022-08-19 RX ORDER — METOPROLOL TARTRATE 100 MG/1
100 TABLET ORAL 2 TIMES DAILY
Status: DISCONTINUED | OUTPATIENT
Start: 2022-08-20 | End: 2022-08-21 | Stop reason: HOSPADM

## 2022-08-19 RX ORDER — L.ACID,PARA/B.BIFIDUM/S.THERM 8B CELL
1 CAPSULE ORAL DAILY
Status: DISCONTINUED | OUTPATIENT
Start: 2022-08-20 | End: 2022-08-21 | Stop reason: HOSPADM

## 2022-08-19 RX ORDER — LEVOTHYROXINE SODIUM 0.1 MG/1
100 TABLET ORAL
Status: DISCONTINUED | OUTPATIENT
Start: 2022-08-20 | End: 2022-08-21 | Stop reason: HOSPADM

## 2022-08-19 RX ORDER — SODIUM CHLORIDE 0.9 % (FLUSH) 0.9 %
10 SYRINGE (ML) INJECTION AS NEEDED
Status: DISCONTINUED | OUTPATIENT
Start: 2022-08-19 | End: 2022-08-21 | Stop reason: HOSPADM

## 2022-08-19 RX ADMIN — SODIUM CHLORIDE 75 ML/HR: 9 INJECTION, SOLUTION INTRAVENOUS at 23:44

## 2022-08-19 RX ADMIN — METOPROLOL TARTRATE 100 MG: 100 TABLET, FILM COATED ORAL at 23:43

## 2022-08-19 RX ADMIN — Medication 10 ML: at 23:57

## 2022-08-19 RX ADMIN — SODIUM CHLORIDE 500 ML: 9 INJECTION, SOLUTION INTRAVENOUS at 21:09

## 2022-08-19 RX ADMIN — PANTOPRAZOLE SODIUM 40 MG: 40 TABLET, DELAYED RELEASE ORAL at 23:46

## 2022-08-19 RX ADMIN — TAZOBACTAM SODIUM AND PIPERACILLIN SODIUM 3.38 G: 375; 3 INJECTION, SOLUTION INTRAVENOUS at 21:26

## 2022-08-19 RX ADMIN — ATORVASTATIN CALCIUM 10 MG: 10 TABLET, FILM COATED ORAL at 23:46

## 2022-08-19 RX ADMIN — HYDRALAZINE HYDROCHLORIDE 25 MG: 25 TABLET, FILM COATED ORAL at 23:46

## 2022-08-19 NOTE — ED PROVIDER NOTES
"Subjective   This is a 89-year-old female that presents the ER with altered mental status, confusion, and questionable fall.  Patient lives in her home, and her 2 adult daughters take turns staying with her for 2-week periods to help take care of her.  Past medical history is significant for mild dementia, but patient performs all of her ADLs.  She also has past medical history significant for myocardial infarction, osteoarthritis, hyperlipidemia, coronary artery disease, hypothyroidism, anxiety/depression, hypertension, history of CHF, GERD, cirrhosis, COPD, paroxysmal atrial fibrillation with use of aspirin 81 mg by mouth daily, and history of stage III chronic kidney disease.  According to daughter, patient got up this morning, and when the daughter heard her up and moving, she heard the door slammed to the front door.  She walked out to see what was going on, and her mother had disappeared in the woods behind her home.  Patient's daughter went looking for her and called her  to come quickly.  They found patient in approximately 1 hour.  Patient says that she fell and was complaining of some lower rib pain bilaterally.  She had no unilateral weakness or focal deficits.  She did not have any speech changes.  Patient was quite confused and says that she was out in the woods looking for \"jose jars\".  Daughter believes that she may have a urinary tract infection.  She has had malodor to the urine as well as describing suprapubic pressure.  She denies any flank or CVA pain.  She denies any fever, chills, or nausea/vomiting.  Appetite has been fair, although patient likes to eat lots of sweets and junk food.  Patient has had recurrent urinary tract infections.  Her last UTI was 18 2022.  After reviewing epic, urine culture from April, 2018 grew out pansensitive E. coli.  Urine culture from January, 2021 grew out E. coli, but there was resistance to multiple antibiotics including ampicillin, cephalosporins, " fluoroquinolones, and Bactrim.  Patient denies any new or recent medication changes.  No other concerns at this time.      History provided by:  Patient  Altered Mental Status  Presenting symptoms: confusion and memory loss    Most recent episode:  Today  Duration:  10 hours  Chronicity:  New  Context: dementia and recent infection (recent UTI with recurrent UTI.)    Context comment:  Pt has daughters take turns and stay with her q2wks.  She has mild dementia, but this AM, she wandered from the house and they found her in the woods an hour later.  Family reports h/o recurrent UTIs.  Last UTI 8/10/22.  Pt reports suprapubic pressure.  Associated symptoms: abdominal pain (suprapubic abdominal pressure) and weakness (generally weak)    Associated symptoms: no agitation, no bladder incontinence, no decreased appetite, no fever, no headaches, no light-headedness, no nausea, no palpitations and no vomiting    Associated symptoms comment:  Diarrhea; chronic.  3-4 loose stools today.      Review of Systems   Constitutional: Positive for activity change, appetite change and fatigue. Negative for decreased appetite and fever.   HENT: Negative.  Negative for congestion, postnasal drip, rhinorrhea, sinus pressure, sinus pain, sneezing and sore throat.    Respiratory: Negative.  Negative for cough and shortness of breath.    Cardiovascular: Negative.  Negative for chest pain, palpitations and leg swelling.   Gastrointestinal: Positive for abdominal pain (suprapubic abdominal pressure) and diarrhea (chronic diarrhea. loose stools x 3-4 toay). Negative for constipation, nausea and vomiting.   Genitourinary: Positive for frequency and urgency. Negative for bladder incontinence, dysuria and flank pain.        History of recurrent UTI.  Last abx was 8/10/22. Malodor to urine.   Musculoskeletal: Positive for back pain (chronic low back pain.  No CVA pain on today's exam.).   Neurological: Positive for weakness (generally weak).  "Negative for dizziness, syncope, facial asymmetry, speech difficulty, light-headedness and headaches.   Psychiatric/Behavioral: Positive for confusion and memory loss. Negative for agitation.   All other systems reviewed and are negative.      Past Medical History:   Diagnosis Date   • Abnormal ECG    • Anemia    • Anxiety and depression    • Arrhythmia    • Atrial fibrillation (HCC)     Daughter reported \"in the past\"   • Back pain    • Cancer (HCC)     Daughter reported skin cancer removed from nose in the past   • CHF (congestive heart failure) (HCC)    • Cirrhosis (HCC)    • CKD (chronic kidney disease), stage III (HCC)     \"Renal failure stage 3\"   • Constipation     Reported intermittent episodes   • COPD (chronic obstructive pulmonary disease) (HCC)    • Coronary artery disease     Patient daughter reported 2 coronary stents   • COVID-19 09/2021   • Diabetes mellitus (HCC) 1990's   • Diarrhea     Reported intermittent episodes   • Disease of thyroid gland    • Elevated cholesterol    • Full dentures     Patient's daughter advised no adhesives DOS   • GERD (gastroesophageal reflux disease)    • Headache    • Heart murmur    • High triglycerides    • History of blood transfusion     Patient's daughter reported unsure but this was previously noted in patients record   • History of bronchitis    • History of pneumonia    • [place] (hard of hearing)     Patient's daughter reported severely [place] and that patient has hearing aids but doesn't wear them   • Hyperlipidemia    • Hypertension 1948   • Iron deficiency 1996   • Myocardial infarction (HCC) 1996    Patient's daughter reported placement of 2 stents   • Osteoarthritis    • Ovarian cyst    • Pneumonia    • Retinal detachment 2009   • Shingles     about to finish treatment up    • SOB (shortness of breath)    • Vision problems        No Known Allergies    Past Surgical History:   Procedure Laterality Date   • CARDIAC CATHETERIZATION  1996    Patient's daughter " reported placement of 2 stents   • CATARACT EXTRACTION Bilateral ,    •  SECTION      x 5: 1948, 1950, 1951, 195, 196   • COLONOSCOPY     • CORONARY STENT PLACEMENT     • ENDOSCOPY N/A 2021    Procedure: ESOPHAGOGASTRODUODENOSCOPY WITH BIOPSY;  Surgeon: Marlon Dixon MD;  Location: [facility] ENDOSCOPY;  Service: Gastroenterology;  Laterality: N/A;   • MOUTH SURGERY      full mouth extraction   • TUBAL ABDOMINAL LIGATION         Family History   Problem Relation Age of Onset   • Liver cancer Brother    • Heart attack Sister    • Colon cancer Neg Hx    • Liver disease Neg Hx    • Cirrhosis Neg Hx        Social History     Socioeconomic History   • Marital status:    Tobacco Use   • Smoking status: Never Smoker   • Smokeless tobacco: Never Used   Vaping Use   • Vaping Use: Never used   Substance and Sexual Activity   • Alcohol use: No   • Drug use: No   • Sexual activity: Not Currently     Partners: Male     Birth control/protection: Post-menopausal, Surgical           Objective   Physical Exam  Vitals and nursing note reviewed.   Constitutional:       General: She is not in acute distress.     Appearance: Normal appearance. She is not ill-appearing or diaphoretic.      Comments: Patient resting comfortably.  No acute sign of pain or distress.  Nontoxic.   HENT:      Head: Normocephalic and atraumatic.      Right Ear: Tympanic membrane normal.      Left Ear: Tympanic membrane normal.      Nose: Nose normal. No congestion or rhinorrhea.      Comments: No nasal congestion or rhinorrhea.     Mouth/Throat:      Mouth: Mucous membranes are moist.      Pharynx: Oropharynx is clear. No oropharyngeal exudate or posterior oropharyngeal erythema.      Comments: Oral mucous membranes are moist.  Posterior pharynx is nonerythematous.  Eyes:      Extraocular Movements: Extraocular movements intact.      Conjunctiva/sclera: Conjunctivae normal.      Pupils: Pupils are equal, round, and  reactive to light.   Cardiovascular:      Rate and Rhythm: Normal rate and regular rhythm.  No extrasystoles are present.     Pulses: Normal pulses.      Heart sounds: Normal heart sounds.      Comments: Regular rate and rhythm.  No ectopy.  No pedal edema to lower extremities.  Pulmonary:      Effort: Pulmonary effort is normal.      Breath sounds: Normal breath sounds.      Comments: Lungs are clear to auscultation bilaterally.  Abdominal:      General: Bowel sounds are normal. There is no distension.      Palpations: Abdomen is soft.      Tenderness: There is abdominal tenderness in the suprapubic area. There is no right CVA tenderness, left CVA tenderness, guarding or rebound.      Comments: Abdomen soft without distention.  Active bowel sounds in all 4 quadrants.  Mild mid suprapubic tenderness.  No rebound or guarding.  No flank or CVA tenderness.  Abdominal exam is benign and nonsurgical.   Musculoskeletal:         General: Normal range of motion.      Cervical back: Normal range of motion and neck supple.      Right lower leg: No edema.      Left lower leg: No edema.   Skin:     General: Skin is warm and dry.   Neurological:      General: No focal deficit present.      Mental Status: She is alert. She is confused.      Cranial Nerves: Cranial nerves are intact.      Sensory: Sensation is intact.      Motor: Weakness present.      Coordination: Coordination is intact.      Comments: Generally weak with overall functional decline.  No focal deficits.  Patient is mildly confused.  She is alert to person and place but not time.  She follows all commands.  Speech is clear and concentrated.  Equal  strength 4 out of 5 bilaterally and equal muscle strength to lower extremities 4 out of 5.  No focal deficits.   Psychiatric:         Attention and Perception: Attention normal.         Mood and Affect: Mood normal.         Speech: Speech normal.         Behavior: Behavior normal. Behavior is cooperative.          Thought Content: Thought content normal.         Cognition and Memory: Cognition normal.         Judgment: Judgment normal.      Comments: No paranoia or delusions during the ER course.  Patient is calm and cooperative.  She smiles and is talkative.         Procedures           ED Course  ED Course as of 08/20/22 0512   Sat Aug 20, 2022   0506 CBC was within normal limits.  White blood cell count was 9.73 with normal differential.  Chemistries reveal BUN and creatinine 36 and 1.42.  Patient has stage III chronic kidney disease.  Last creatinine 1 year ago was 1.12.  Urinalysis reveals 1+ leukocytes, 13-20 white blood cells, and 4+ bacteria.  Urine culture is in process.  Lactic acid is 1.0.  Creatinine kinase is 95.  Procalcitonin was 0.09.  LDH is 256.  COVID-19 and influenza testing was negative.  Patient given 500 cc bolus and initiated on Zosyn.  Blood cultures x2 sets are in process.  CT of the brain without contrast reveals no acute intracranial abnormality.  Chest x-ray and bilateral rib series revealed no visible rib fracture.  Mild chronic appearing interstitial lung changes and focal left basilar atelectasis.  Mild cardiomegaly and pulmonary venous hypertension.  Exam and vital signs are stable.  Discussed admission with Dr. Perez, hospitalist, due to recurrent UTI with altered mental status and confusion and history of resistant bacteria on urine culture.  Dr. Perez is agreeable to admission on telemetry.  Updated patient and family on all results and need for admission and they are agreeable and appreciative. [FC]      ED Course User Index  [FC] Enriqueta Lewis, EDGAR           Recent Results (from the past 24 hour(s))   Comprehensive Metabolic Panel    Collection Time: 08/19/22  4:31 PM    Specimen: Blood   Result Value Ref Range    Glucose 99 65 - 99 mg/dL    BUN 36 (H) 8 - 23 mg/dL    Creatinine 1.42 (H) 0.57 - 1.00 mg/dL    Sodium 141 136 - 145 mmol/L    Potassium 4.1 3.5 - 5.2 mmol/L    Chloride 105  98 - 107 mmol/L    CO2 23.0 22.0 - 29.0 mmol/L    Calcium 9.8 (H) 8.2 - 9.6 mg/dL    Total Protein 7.3 6.0 - 8.5 g/dL    Albumin 3.90 3.50 - 5.20 g/dL    ALT (SGPT) 24 1 - 33 U/L    AST (SGOT) 30 1 - 32 U/L    Alkaline Phosphatase 100 39 - 117 U/L    Total Bilirubin 0.2 0.0 - 1.2 mg/dL    Globulin 3.4 gm/dL    A/G Ratio 1.1 g/dL    BUN/Creatinine Ratio 25.4 (H) 7.0 - 25.0    Anion Gap 13.0 5.0 - 15.0 mmol/L    eGFR 35.0 (L) >60.0 mL/min/1.73   Lipase    Collection Time: 08/19/22  4:31 PM    Specimen: Blood   Result Value Ref Range    Lipase 40 13 - 60 U/L   Lactic Acid, Plasma    Collection Time: 08/19/22  4:31 PM    Specimen: Blood   Result Value Ref Range    Lactate 1.0 0.5 - 2.0 mmol/L   Green Top (Gel)    Collection Time: 08/19/22  4:31 PM   Result Value Ref Range    Extra Tube Hold for add-ons.    Lavender Top    Collection Time: 08/19/22  4:31 PM   Result Value Ref Range    Extra Tube hold for add-on    Gold Top - SST    Collection Time: 08/19/22  4:31 PM   Result Value Ref Range    Extra Tube Hold for add-ons.    Gray Top    Collection Time: 08/19/22  4:31 PM   Result Value Ref Range    Extra Tube Hold for add-ons.    Light Blue Top    Collection Time: 08/19/22  4:31 PM   Result Value Ref Range    Extra Tube Hold for add-ons.    CBC Auto Differential    Collection Time: 08/19/22  4:31 PM    Specimen: Blood   Result Value Ref Range    WBC 9.73 3.40 - 10.80 10*3/mm3    RBC 4.13 3.77 - 5.28 10*6/mm3    Hemoglobin 12.1 12.0 - 15.9 g/dL    Hematocrit 37.0 34.0 - 46.6 %    MCV 89.6 79.0 - 97.0 fL    MCH 29.3 26.6 - 33.0 pg    MCHC 32.7 31.5 - 35.7 g/dL    RDW 15.6 (H) 12.3 - 15.4 %    RDW-SD 51.6 37.0 - 54.0 fl    MPV 11.2 6.0 - 12.0 fL    Platelets 233 140 - 450 10*3/mm3    Neutrophil % 68.1 42.7 - 76.0 %    Lymphocyte % 18.2 (L) 19.6 - 45.3 %    Monocyte % 11.7 5.0 - 12.0 %    Eosinophil % 1.5 0.3 - 6.2 %    Basophil % 0.2 0.0 - 1.5 %    Immature Grans % 0.3 0.0 - 0.5 %    Neutrophils, Absolute 6.62 1.70 - 7.00  10*3/mm3    Lymphocytes, Absolute 1.77 0.70 - 3.10 10*3/mm3    Monocytes, Absolute 1.14 (H) 0.10 - 0.90 10*3/mm3    Eosinophils, Absolute 0.15 0.00 - 0.40 10*3/mm3    Basophils, Absolute 0.02 0.00 - 0.20 10*3/mm3    Immature Grans, Absolute 0.03 0.00 - 0.05 10*3/mm3    nRBC 0.0 0.0 - 0.2 /100 WBC   Procalcitonin    Collection Time: 08/19/22  4:31 PM    Specimen: Blood   Result Value Ref Range    Procalcitonin 0.09 0.00 - 0.25 ng/mL   Lactate Dehydrogenase    Collection Time: 08/19/22  4:31 PM    Specimen: Blood   Result Value Ref Range     (H) 135 - 214 U/L   CK    Collection Time: 08/19/22  4:31 PM    Specimen: Blood   Result Value Ref Range    Creatine Kinase 95 20 - 180 U/L   Urinalysis With Culture If Indicated - Urine, Clean Catch    Collection Time: 08/19/22  7:50 PM    Specimen: Urine, Clean Catch   Result Value Ref Range    Color, UA Yellow Yellow, Straw    Appearance, UA Clear Clear    pH, UA 5.5 5.0 - 8.0    Specific Gravity, UA 1.016 1.001 - 1.030    Glucose, UA Negative Negative    Ketones, UA Negative Negative    Bilirubin, UA Negative Negative    Blood, UA Negative Negative    Protein,  mg/dL (2+) (A) Negative    Leuk Esterase, UA Small (1+) (A) Negative    Nitrite, UA Negative Negative    Urobilinogen, UA 0.2 E.U./dL 0.2 - 1.0 E.U./dL   Urinalysis, Microscopic Only - Urine, Clean Catch    Collection Time: 08/19/22  7:50 PM    Specimen: Urine, Clean Catch   Result Value Ref Range    RBC, UA 0-2 None Seen, 0-2 /HPF    WBC, UA 13-20 (A) None Seen, 0-2 /HPF    Bacteria, UA 4+ (A) None Seen, Trace /HPF    Squamous Epithelial Cells, UA None Seen None Seen, 0-2 /HPF    Hyaline Casts, UA 0-6 0 - 6 /LPF    Methodology Automated Microscopy    COVID-19 and FLU A/B PCR - Swab, Nasopharynx    Collection Time: 08/19/22  7:52 PM    Specimen: Nasopharynx; Swab   Result Value Ref Range    COVID19 Not Detected Not Detected - Ref. Range    Influenza A PCR Not Detected Not Detected    Influenza B PCR Not  Detected Not Detected     Note: In addition to lab results from this visit, the labs listed above may include labs taken at another facility or during a different encounter within the last 24 hours. Please correlate lab times with ED admission and discharge times for further clarification of the services performed during this visit.    CT Chest Without Contrast Diagnostic   Final Result      1.  Trace fat stranding about tail of pancreas, nonspecific. Recommend correlation with serum lipase to exclude acute pancreatitis.   2.  Minor subcutaneous fat stranding bilateral anterior abdominal wall. Findings may represent acute subacute contusions related to fall versus from prior injections.   3.  Moderate peripheral tree-in-bud nodularity left lung greater than right differential considerations include chronic aspiration, infectious bronchiolitis, inflammatory bronchiolitis.   4.  Trace left pleural effusion.   5.  No other acute abnormality within limitations of unenhanced exam.   6.  Minor chronic liver disease.   7.  Other chronic findings as above.      Electronically signed by:  Nader Barnes M.D.     8/19/2022 11:58 PM Mountain Time      CT Abdomen Pelvis Without Contrast   Final Result      1.  Trace fat stranding about tail of pancreas, nonspecific. Recommend correlation with serum lipase to exclude acute pancreatitis.   2.  Minor subcutaneous fat stranding bilateral anterior abdominal wall. Findings may represent acute subacute contusions related to fall versus from prior injections.   3.  Moderate peripheral tree-in-bud nodularity left lung greater than right differential considerations include chronic aspiration, infectious bronchiolitis, inflammatory bronchiolitis.   4.  Trace left pleural effusion.   5.  No other acute abnormality within limitations of unenhanced exam.   6.  Minor chronic liver disease.   7.  Other chronic findings as above.      Electronically signed by:  Nader Barnes M.D.      8/19/2022 11:54 PM Mountain Time      CT Head Without Contrast   Final Result   Chronic appearing changes of the aging brain. No evidence of acute   intracranial disease is seen.       This report was finalized on 8/19/2022 8:23 PM by Dr. Akbar Yung MD.          XR Ribs Bilateral 3 View   Final Result       1. No visible rib fracture.   2. Mild chronic appearing interstitial lung changes and focal left   basilar atelectasis or pneumonia.   3. Right upper quadrant calcifications, possibly gallstones.       4. Mild cardiomegaly and pulmonary venous hypertension.       This report was finalized on 8/19/2022 5:55 PM by Dr. Akbar Yung MD.            Vitals:    08/19/22 2338 08/19/22 2343 08/20/22 0055 08/20/22 0500   BP:  180/61 175/63 (!) 198/77   BP Location:  Right arm     Patient Position:  Sitting     Pulse:  59 55    Resp:  18     Temp:  97.8 °F (36.6 °C)     TempSrc:  Oral     SpO2:  95% 94%    Weight: 61.2 kg (135 lb)      Height:         Medications   Sodium Chloride (PF) 0.9 % 10 mL (has no administration in time range)   acetaminophen (TYLENOL) tablet 650 mg (has no administration in time range)   aspirin EC tablet 81 mg (has no administration in time range)   dilTIAZem CD (CARDIZEM CD) 24 hr capsule 240 mg (has no administration in time range)   hydrALAZINE (APRESOLINE) tablet 25 mg (25 mg Oral Given 8/19/22 2346)   levothyroxine (SYNTHROID, LEVOTHROID) tablet 100 mcg (100 mcg Oral Given 8/20/22 0500)   metoprolol tartrate (LOPRESSOR) tablet 100 mg (100 mg Oral Given 8/19/22 2343)   multivitamin with minerals 1 tablet (has no administration in time range)   pantoprazole (PROTONIX) EC tablet 40 mg (40 mg Oral Given 8/19/22 2346)   atorvastatin (LIPITOR) tablet 10 mg (10 mg Oral Given 8/19/22 2346)   sodium chloride 0.9 % flush 10 mL (10 mL Intravenous Given 8/19/22 2357)   sodium chloride 0.9 % flush 10 mL (has no administration in time range)   dextrose (GLUTOSE) oral gel 15 g (has no administration in time  range)   dextrose (D50W) (25 g/50 mL) IV injection 25 g (has no administration in time range)   glucagon (human recombinant) (GLUCAGEN DIAGNOSTIC) injection 1 mg (has no administration in time range)   lactobacillus acidophilus (RISAQUAD) capsule 1 capsule (has no administration in time range)   piperacillin-tazobactam (ZOSYN) 3.375 g in iso-osmotic dextrose 50 ml (premix) (has no administration in time range)   heparin (porcine) 5000 UNIT/ML injection 5,000 Units (5,000 Units Subcutaneous Given 8/20/22 0500)   sodium chloride 0.9 % infusion (75 mL/hr Intravenous New Bag 8/19/22 2344)   Insulin Lispro (humaLOG) injection 0-7 Units (has no administration in time range)   doxycycline (VIBRAMYCIN) 100 mg in sodium chloride 0.9 % 250 mL IVPB-VTB (100 mg Intravenous Given 8/20/22 0501)   sodium chloride 0.9 % bolus 500 mL (0 mL Intravenous Stopped 8/19/22 2233)   piperacillin-tazobactam (ZOSYN) 3.375 g in iso-osmotic dextrose 50 ml (premix) (0 g Intravenous Stopped 8/19/22 2156)     ECG/EMG Results (last 24 hours)     ** No results found for the last 24 hours. 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   Confusion   Fall, initial encounter   Contusion of rib, unspecified laterality, initial encounter   Stage 3 chronic kidney disease, unspecified whether stage 3a or 3b CKD (HCC)   History of coronary artery disease   History of diabetes mellitus   Paroxysmal atrial fibrillation (HCC)       ED Disposition  ED Disposition     ED Disposition   Decision to Admit    Condition   --    Comment   Level of Care: Telemetry [5]   Diagnosis: UTI (urinary tract infection) [565097]   Admitting Physician: JOHANA OLMEDO [[id number]]   Attending Physician: JOHANA OLMEDO [[id number]]   Bed Request Comments: tele               No follow-up provider specified.        Medication List      No changes were made to your prescriptions during this visit.          Enriqueta Lewis PA-C  08/20/22 0512

## 2022-08-20 ENCOUNTER — APPOINTMENT (OUTPATIENT)
Dept: CT IMAGING | Facility: HOSPITAL | Age: 87
End: 2022-08-20

## 2022-08-20 PROBLEM — R05.9 COUGH: Status: RESOLVED | Noted: 2022-08-20 | Resolved: 2022-08-20

## 2022-08-20 PROBLEM — E83.42 HYPOMAGNESEMIA: Status: ACTIVE | Noted: 2022-08-20

## 2022-08-20 PROBLEM — R41.82 ALTERED MENTAL STATUS, UNSPECIFIED ALTERED MENTAL STATUS TYPE: Status: ACTIVE | Noted: 2022-08-20

## 2022-08-20 PROBLEM — R05.9 COUGH: Status: ACTIVE | Noted: 2022-08-20

## 2022-08-20 LAB
ANION GAP SERPL CALCULATED.3IONS-SCNC: 10 MMOL/L (ref 5–15)
B PARAPERT DNA SPEC QL NAA+PROBE: NOT DETECTED
B PERT DNA SPEC QL NAA+PROBE: NOT DETECTED
BASOPHILS # BLD AUTO: 0.02 10*3/MM3 (ref 0–0.2)
BASOPHILS NFR BLD AUTO: 0.2 % (ref 0–1.5)
BUN SERPL-MCNC: 25 MG/DL (ref 8–23)
BUN/CREAT SERPL: 22.3 (ref 7–25)
C PNEUM DNA NPH QL NAA+NON-PROBE: NOT DETECTED
CALCIUM SPEC-SCNC: 8.5 MG/DL (ref 8.2–9.6)
CHLORIDE SERPL-SCNC: 109 MMOL/L (ref 98–107)
CO2 SERPL-SCNC: 21 MMOL/L (ref 22–29)
CREAT SERPL-MCNC: 1.12 MG/DL (ref 0.57–1)
DEPRECATED RDW RBC AUTO: 52.6 FL (ref 37–54)
EGFRCR SERPLBLD CKD-EPI 2021: 46.5 ML/MIN/1.73
EOSINOPHIL # BLD AUTO: 0.31 10*3/MM3 (ref 0–0.4)
EOSINOPHIL NFR BLD AUTO: 3.7 % (ref 0.3–6.2)
ERYTHROCYTE [DISTWIDTH] IN BLOOD BY AUTOMATED COUNT: 15.8 % (ref 12.3–15.4)
FLUAV SUBTYP SPEC NAA+PROBE: NOT DETECTED
FLUBV RNA ISLT QL NAA+PROBE: NOT DETECTED
GLUCOSE BLDC GLUCOMTR-MCNC: 120 MG/DL (ref 70–130)
GLUCOSE BLDC GLUCOMTR-MCNC: 165 MG/DL (ref 70–130)
GLUCOSE BLDC GLUCOMTR-MCNC: 213 MG/DL (ref 70–130)
GLUCOSE BLDC GLUCOMTR-MCNC: 62 MG/DL (ref 70–130)
GLUCOSE BLDC GLUCOMTR-MCNC: 68 MG/DL (ref 70–130)
GLUCOSE BLDC GLUCOMTR-MCNC: 96 MG/DL (ref 70–130)
GLUCOSE SERPL-MCNC: 257 MG/DL (ref 65–99)
HADV DNA SPEC NAA+PROBE: NOT DETECTED
HBA1C MFR BLD: 6.2 % (ref 4.8–5.6)
HCOV 229E RNA SPEC QL NAA+PROBE: NOT DETECTED
HCOV HKU1 RNA SPEC QL NAA+PROBE: NOT DETECTED
HCOV NL63 RNA SPEC QL NAA+PROBE: NOT DETECTED
HCOV OC43 RNA SPEC QL NAA+PROBE: NOT DETECTED
HCT VFR BLD AUTO: 33.8 % (ref 34–46.6)
HGB BLD-MCNC: 10.7 G/DL (ref 12–15.9)
HMPV RNA NPH QL NAA+NON-PROBE: NOT DETECTED
HPIV1 RNA ISLT QL NAA+PROBE: NOT DETECTED
HPIV2 RNA SPEC QL NAA+PROBE: NOT DETECTED
HPIV3 RNA NPH QL NAA+PROBE: NOT DETECTED
HPIV4 P GENE NPH QL NAA+PROBE: NOT DETECTED
IMM GRANULOCYTES # BLD AUTO: 0.02 10*3/MM3 (ref 0–0.05)
IMM GRANULOCYTES NFR BLD AUTO: 0.2 % (ref 0–0.5)
L PNEUMO1 AG UR QL IA: NEGATIVE
LYMPHOCYTES # BLD AUTO: 1.61 10*3/MM3 (ref 0.7–3.1)
LYMPHOCYTES NFR BLD AUTO: 19.1 % (ref 19.6–45.3)
M PNEUMO IGG SER IA-ACNC: NOT DETECTED
MAGNESIUM SERPL-MCNC: 1.3 MG/DL (ref 1.7–2.3)
MCH RBC QN AUTO: 28.9 PG (ref 26.6–33)
MCHC RBC AUTO-ENTMCNC: 31.7 G/DL (ref 31.5–35.7)
MCV RBC AUTO: 91.4 FL (ref 79–97)
MONOCYTES # BLD AUTO: 1.16 10*3/MM3 (ref 0.1–0.9)
MONOCYTES NFR BLD AUTO: 13.8 % (ref 5–12)
NEUTROPHILS NFR BLD AUTO: 5.29 10*3/MM3 (ref 1.7–7)
NEUTROPHILS NFR BLD AUTO: 63 % (ref 42.7–76)
NRBC BLD AUTO-RTO: 0 /100 WBC (ref 0–0.2)
PLATELET # BLD AUTO: 180 10*3/MM3 (ref 140–450)
PMV BLD AUTO: 11.1 FL (ref 6–12)
POTASSIUM SERPL-SCNC: 4.4 MMOL/L (ref 3.5–5.2)
RBC # BLD AUTO: 3.7 10*6/MM3 (ref 3.77–5.28)
RHINOVIRUS RNA SPEC NAA+PROBE: NOT DETECTED
RSV RNA NPH QL NAA+NON-PROBE: NOT DETECTED
S PNEUM AG SPEC QL LA: NEGATIVE
SARS-COV-2 RNA NPH QL NAA+NON-PROBE: NOT DETECTED
SODIUM SERPL-SCNC: 140 MMOL/L (ref 136–145)
TSH SERPL DL<=0.05 MIU/L-ACNC: 0.58 UIU/ML (ref 0.27–4.2)
WBC NRBC COR # BLD: 8.41 10*3/MM3 (ref 3.4–10.8)

## 2022-08-20 PROCEDURE — 82962 GLUCOSE BLOOD TEST: CPT

## 2022-08-20 PROCEDURE — 85025 COMPLETE CBC W/AUTO DIFF WBC: CPT | Performed by: NURSE PRACTITIONER

## 2022-08-20 PROCEDURE — 74176 CT ABD & PELVIS W/O CONTRAST: CPT

## 2022-08-20 PROCEDURE — G0378 HOSPITAL OBSERVATION PER HR: HCPCS

## 2022-08-20 PROCEDURE — 25010000002 PIPERACILLIN SOD-TAZOBACTAM PER 1 G

## 2022-08-20 PROCEDURE — 96361 HYDRATE IV INFUSION ADD-ON: CPT

## 2022-08-20 PROCEDURE — 0 MAGNESIUM SULFATE 4 GM/100ML SOLUTION: Performed by: INTERNAL MEDICINE

## 2022-08-20 PROCEDURE — 25010000002 HYDRALAZINE PER 20 MG

## 2022-08-20 PROCEDURE — 96375 TX/PRO/DX INJ NEW DRUG ADDON: CPT

## 2022-08-20 PROCEDURE — 96372 THER/PROPH/DIAG INJ SC/IM: CPT

## 2022-08-20 PROCEDURE — 87449 NOS EACH ORGANISM AG IA: CPT | Performed by: INTERNAL MEDICINE

## 2022-08-20 PROCEDURE — 99224 PR SBSQ OBSERVATION CARE/DAY 15 MINUTES: CPT | Performed by: INTERNAL MEDICINE

## 2022-08-20 PROCEDURE — 71250 CT THORAX DX C-: CPT

## 2022-08-20 PROCEDURE — 96366 THER/PROPH/DIAG IV INF ADDON: CPT

## 2022-08-20 PROCEDURE — 80048 BASIC METABOLIC PNL TOTAL CA: CPT | Performed by: NURSE PRACTITIONER

## 2022-08-20 PROCEDURE — 25010000002 HEPARIN (PORCINE) PER 1000 UNITS: Performed by: NURSE PRACTITIONER

## 2022-08-20 PROCEDURE — 0202U NFCT DS 22 TRGT SARS-COV-2: CPT | Performed by: INTERNAL MEDICINE

## 2022-08-20 PROCEDURE — 83036 HEMOGLOBIN GLYCOSYLATED A1C: CPT | Performed by: NURSE PRACTITIONER

## 2022-08-20 PROCEDURE — 83735 ASSAY OF MAGNESIUM: CPT | Performed by: NURSE PRACTITIONER

## 2022-08-20 PROCEDURE — 84443 ASSAY THYROID STIM HORMONE: CPT | Performed by: NURSE PRACTITIONER

## 2022-08-20 RX ORDER — HYDRALAZINE HYDROCHLORIDE 50 MG/1
50 TABLET, FILM COATED ORAL 2 TIMES DAILY
Status: DISCONTINUED | OUTPATIENT
Start: 2022-08-20 | End: 2022-08-21 | Stop reason: HOSPADM

## 2022-08-20 RX ORDER — NYSTATIN 100000 [USP'U]/G
POWDER TOPICAL EVERY 12 HOURS SCHEDULED
Status: DISCONTINUED | OUTPATIENT
Start: 2022-08-20 | End: 2022-08-21 | Stop reason: HOSPADM

## 2022-08-20 RX ORDER — MAGNESIUM SULFATE HEPTAHYDRATE 40 MG/ML
4 INJECTION, SOLUTION INTRAVENOUS ONCE
Status: COMPLETED | OUTPATIENT
Start: 2022-08-20 | End: 2022-08-20

## 2022-08-20 RX ORDER — HYDRALAZINE HYDROCHLORIDE 20 MG/ML
10 INJECTION INTRAMUSCULAR; INTRAVENOUS ONCE
Status: COMPLETED | OUTPATIENT
Start: 2022-08-20 | End: 2022-08-20

## 2022-08-20 RX ADMIN — TAZOBACTAM SODIUM AND PIPERACILLIN SODIUM 3.38 G: 375; 3 INJECTION, SOLUTION INTRAVENOUS at 13:13

## 2022-08-20 RX ADMIN — ASPIRIN 81 MG: 81 TABLET, COATED ORAL at 08:55

## 2022-08-20 RX ADMIN — HEPARIN SODIUM 5000 UNITS: 5000 INJECTION INTRAVENOUS; SUBCUTANEOUS at 20:47

## 2022-08-20 RX ADMIN — DOXYCYCLINE 100 MG: 100 INJECTION, POWDER, LYOPHILIZED, FOR SOLUTION INTRAVENOUS at 17:59

## 2022-08-20 RX ADMIN — HYDRALAZINE HYDROCHLORIDE 25 MG: 25 TABLET, FILM COATED ORAL at 08:57

## 2022-08-20 RX ADMIN — NYSTATIN: 100000 POWDER TOPICAL at 20:47

## 2022-08-20 RX ADMIN — Medication 1 CAPSULE: at 08:55

## 2022-08-20 RX ADMIN — Medication 10 ML: at 21:01

## 2022-08-20 RX ADMIN — TAZOBACTAM SODIUM AND PIPERACILLIN SODIUM 3.38 G: 375; 3 INJECTION, SOLUTION INTRAVENOUS at 21:00

## 2022-08-20 RX ADMIN — HYDRALAZINE HYDROCHLORIDE 50 MG: 50 TABLET, FILM COATED ORAL at 20:47

## 2022-08-20 RX ADMIN — Medication 10 ML: at 08:55

## 2022-08-20 RX ADMIN — TAZOBACTAM SODIUM AND PIPERACILLIN SODIUM 3.38 G: 375; 3 INJECTION, SOLUTION INTRAVENOUS at 07:33

## 2022-08-20 RX ADMIN — HEPARIN SODIUM 5000 UNITS: 5000 INJECTION INTRAVENOUS; SUBCUTANEOUS at 05:00

## 2022-08-20 RX ADMIN — ATORVASTATIN CALCIUM 10 MG: 10 TABLET, FILM COATED ORAL at 20:47

## 2022-08-20 RX ADMIN — METOPROLOL TARTRATE 100 MG: 100 TABLET, FILM COATED ORAL at 20:47

## 2022-08-20 RX ADMIN — MULTIPLE VITAMINS W/ MINERALS TAB 1 TABLET: TAB at 08:55

## 2022-08-20 RX ADMIN — LEVOTHYROXINE SODIUM 100 MCG: 0.1 TABLET ORAL at 05:00

## 2022-08-20 RX ADMIN — HEPARIN SODIUM 5000 UNITS: 5000 INJECTION INTRAVENOUS; SUBCUTANEOUS at 13:13

## 2022-08-20 RX ADMIN — PANTOPRAZOLE SODIUM 40 MG: 40 TABLET, DELAYED RELEASE ORAL at 20:47

## 2022-08-20 RX ADMIN — HYDRALAZINE HYDROCHLORIDE 10 MG: 20 INJECTION INTRAMUSCULAR; INTRAVENOUS at 05:55

## 2022-08-20 RX ADMIN — DILTIAZEM HYDROCHLORIDE 240 MG: 240 CAPSULE, COATED, EXTENDED RELEASE ORAL at 08:55

## 2022-08-20 RX ADMIN — NYSTATIN: 100000 POWDER TOPICAL at 14:54

## 2022-08-20 RX ADMIN — DOXYCYCLINE 100 MG: 100 INJECTION, POWDER, LYOPHILIZED, FOR SOLUTION INTRAVENOUS at 05:01

## 2022-08-20 RX ADMIN — MAGNESIUM SULFATE HEPTAHYDRATE 4 G: 40 INJECTION, SOLUTION INTRAVENOUS at 16:30

## 2022-08-20 NOTE — NURSING NOTE
[facility] Nursing Consult:Spot on second toe left foot family say it is skin cancer    Patient in bed, [place] but alert and oriented.   Daughter at bedside, RN came in to assist with turnining.    Skin assessed and the following noted:    1.Wound Assessment    Wound Type: Unknown etiology, patient's daughter states that is been there for years and that patient has seen dermatologist and have tried various treatments including burning it off but it always comes back.    Location: Left lateral second toe    Measurements: 1 x 1 x 0 cm    Wound Bed: Red/purple/nonblanchable    Wound Edges: Callus/open    Periwound Skin: Blanchable    Drainage: None noted    Odor: None    Pain: Patient expresses pain with palpation/assessment    Care provided: Cleansed with normal saline, patted dry, applied skin protectant to periwound skin, applied small amount of Thera honey gel to wound bed and covered with silicone foam border dressing.    Recommendation(s) for management of wound: See wound care orders    Image:        2.Wound Assessment    Wound Type: MASD/ITD with partial-thickness skin loss and suspected yeast component due to drainage present  Location: Bilateral groin fold    Wound Bed: Red, blanchable, slightly swollen    Wound Edges: Open    Periwound Skin: Intact blanchable    Drainage: Serous    Odor: None noted  Pain: None noted    Care provided: Assessed    Recommendation(s) for management of wound: Discussed with RN and will request nystatin powder with wicking fabric to treat this ITD with suspected yeast infection.    Image:            Pressure Injury Prevention Protocol (initiate for Dani Score of 18 or less): Most recent Dani Scale score: 15    *Please apply waffle mattress overlay if not already completed    *Keep skin dry, turn q 2 hr, keep heels elevated and offloaded with offloading heel boots.     *Apply z-guard to bottom BID and as needed with incontinence episodes.    *Apply silicone foam border dressing to bony  prominences.      *Follow C.A.R.E protocol if medical devices (Bipap, levy, Ng tube, etc) are being used.    Head to toe assessment completed. Heels and bottom blanchable, intact and requested RN to apply bilateral heel boots while patient in bed.    Discussed plan of care with  RN and educated patient's daughter on Thera honey dressing, and left supplies at bedside..      Thank you for consulting the WOC Nurse.  WOC Team will sign off.  If alteration to skin integrity or change in wound bed presentation, please contact WOC team.

## 2022-08-20 NOTE — PROGRESS NOTES
"                  Clinical Nutrition     Patient Name: Ninoska Gloria  YOB: 1931  MRN: [id number]  Date of Encounter: 08/20/22 10:06 EDT  Admission date: 8/19/2022    Reason for Visit     MST=2 (Eating poorly, 2-13# wt loss); unintentional loss of 10# ore more in the past two weeks    EMR Reviewed: Yes     Diet Nutrition Related History:      Pt admitted d/t AMS. Sister at bedside. Pt was sleeping at time of visit. Daughter stated pt's UBW is 135#. Daughter reports pt has slowly lost weight over the past three months and was down to 128#. She reported pt weighed 128# at the beginning of August. But recently gained the weight back. Diet tech observed bed scale of 134.8#. Daughter reports pt does not have an appetite and doesn't like to eat d/t abdominal cramps and gas. Daughter also reports pt has been sleeping more over the past week and thinks that has also reduced pt's intakes. Daughter has to wake pt up at home in order to get pt to eat. States pt is eating 40% of usual intakes over the past 3-4 months. Daughter reports that pt eats small portions and likes soups. Daughter reports NKFA. Daughter says pt has glucernas at home, reports pt does not like them but may like magic cups.    Anthropometrics   Height: Height: 152.4 cm (60\")  Admission Weight:   Flowsheet Rows    Flowsheet Row First Filed Value   Admission Height 152.4 cm (60\") Documented at 08/19/2022 1556   Admission Weight 61.2 kg (135 lb) Documented at 08/19/2022 1556        Last Filed Weight:Weight: 61.3 kg (135 lb 3.2 oz) (08/20/22 0541)  Weight Method: Stated, diet tech observed 134.8# (8/20/22-bed scale)  BMI: BMI (Calculated): 26.4  BMI classification: Overweight: 25.0-29.9kg/m2    IBW: Ideal Body Weight (IBW) (kg): 45.86  EMR: 137# (12/13/21-MDOV)  UBW: 135# per family member at bedside  Weight change: 2.2# wt loss per EMR and observed wt  % change: 1.6%  Time frame: ~8 months     Current Nutrition Prescription     Diet Regular; " Cardiac, Consistent Carbohydrate    Supplements: Diet tech will order magic cup 1 time/day    Average Intake from Charting:   No intakes recorded    Intake History:     Intake Category  <=50%    >= 1 MONTH    Actions   Appropriateness for MSA:  Per review patient may qualify for malnutrition diagnosis, RDN to assess/evaluate per protocol.     Interventions:   Follow treatment progress, Care plan reviewed, Interview for preferences, Menu provided, Encourage intake, Supplement provided, Diet tech will send diet message on food preferences    [employer],   Time Spent: 25 min

## 2022-08-20 NOTE — PROGRESS NOTES
[facility] Medicine Services  PROGRESS NOTE    Patient Name: Ninoska Gloria  : 1931  MRN: [id number]    Date of Admission: 2022  Primary Care Physician: Lizette Fitzpatrick PA    Subjective   Subjective     CC:  confusion    HPI:  Patient has no complaints - denies dysuria, trouble urinating or cloudy urine at home and denies any pain.  Daughter reports she is back to baseline this morning    ROS:  Gen- No fevers, chills  CV- No chest pain, palpitations  Resp- No cough, dyspnea  GI- No N/V/D, abd pain         Objective   Objective     Vital Signs:   Temp:  [97.7 °F (36.5 °C)-98 °F (36.7 °C)] 97.7 °F (36.5 °C)  Heart Rate:  [55-62] 59  Resp:  [16-18] 16  BP: (162-198)/(61-98) 162/98     Physical Exam:  Constitutional: No acute distress, awake, alert older female w daughter bedside  HENT: NCAT, mucous membranes moist  Respiratory: Clear to auscultation bilaterally, respiratory effort normal   Cardiovascular: RRR, no murmurs, rubs, or gallops  Gastrointestinal: Soft, nontender, nondistended. Some mild tenderness in lower abdomen on exam  Musculoskeletal: Muscle tone within normal limits, no joint effusions appreciated  Psychiatric: Appropriate affect, cooperative  Neurologic: Alert and oriented, facial movements symmetric and spontaneous movement of all 4 extremities grossly equal bilaterally, speech clear  Skin: No rashes      Results Reviewed: reviewed labs from overnight, reviewed CT head/chest abdomen pelvis and Xray rib reports. Notable for minor fat inflammation of soft tissue without any fracture. Some signs of chronic aspiration v bronchiectasis    U/a concerning for infections.   I reviewed previous urine cultures here: E coli x 2 over years with one pan-susceptible, another resistant to multiple abx. Urine cx here pending, blood cultures ngtd    LAB RESULTS:      Lab 22  0952 22  1631   WBC 8.41 9.73   HEMOGLOBIN 10.7* 12.1   HEMATOCRIT 33.8* 37.0   PLATELETS  180 233   NEUTROS ABS 5.29 6.62   IMMATURE GRANS (ABS) 0.02 0.03   LYMPHS ABS 1.61 1.77   MONOS ABS 1.16* 1.14*   EOS ABS 0.31 0.15   MCV 91.4 89.6   PROCALCITONIN  --  0.09   LACTATE  --  1.0   LDH  --  256*         Lab 08/20/22  0952 08/19/22  1631   SODIUM  --  141   POTASSIUM  --  4.1   CHLORIDE  --  105   CO2  --  23.0   ANION GAP  --  13.0   BUN  --  36*   CREATININE  --  1.42*   EGFR  --  35.0*   GLUCOSE  --  99   CALCIUM  --  9.8*   HEMOGLOBIN A1C 6.20*  --          Lab 08/19/22  1631   TOTAL PROTEIN 7.3   ALBUMIN 3.90   GLOBULIN 3.4   ALT (SGPT) 24   AST (SGOT) 30   BILIRUBIN 0.2   ALK PHOS 100   LIPASE 40                     Brief Urine Lab Results  (Last result in the past 365 days)      Color   Clarity   Blood   Leuk Est   Nitrite   Protein   CREAT   Urine HCG        08/19/22 1950 Yellow   Clear   Negative   Small (1+)   Negative   100 mg/dL (2+)                 Microbiology Results Abnormal     Procedure Component Value - Date/Time    COVID PRE-OP / PRE-PROCEDURE SCREENING ORDER (NO ISOLATION) - Swab, Nasopharynx [[id number]]  (Normal) Collected: 08/20/22 0506    Lab Status: Final result Specimen: Swab from Nasopharynx Updated: 08/20/22 0813    Narrative:      The following orders were created for panel order COVID PRE-OP / PRE-PROCEDURE SCREENING ORDER (NO ISOLATION) - Swab, Nasopharynx.  Procedure                               Abnormality         Status                     ---------                               -----------         ------                     Respiratory Panel PCR w/...[[id number]]  Normal              Final result                 Please view results for these tests on the individual orders.    Respiratory Panel PCR w/COVID-19(SARS-CoV-2) ADAM/NATY/SHASTA/PAD/COR/MAD/KARLA In-House, NP Swab in UTM/VTM, 3-4 HR TAT - Swab, Nasopharynx [[id number]]  (Normal) Collected: 08/20/22 0506    Lab Status: Final result Specimen: Swab from Nasopharynx Updated: 08/20/22 0813     ADENOVIRUS, PCR Not Detected      Coronavirus 229E Not Detected     Coronavirus HKU1 Not Detected     Coronavirus NL63 Not Detected     Coronavirus OC43 Not Detected     COVID19 Not Detected     Human Metapneumovirus Not Detected     Human Rhinovirus/Enterovirus Not Detected     Influenza A PCR Not Detected     Influenza B PCR Not Detected     Parainfluenza Virus 1 Not Detected     Parainfluenza Virus 2 Not Detected     Parainfluenza Virus 3 Not Detected     Parainfluenza Virus 4 Not Detected     RSV, PCR Not Detected     Bordetella pertussis pcr Not Detected     Bordetella parapertussis PCR Not Detected     Chlamydophila pneumoniae PCR Not Detected     Mycoplasma pneumo by PCR Not Detected    Narrative:      In the setting of a positive respiratory panel with a viral infection PLUS a negative procalcitonin without other underlying concern for bacterial infection, consider observing off antibiotics or discontinuation of antibiotics and continue supportive care. If the respiratory panel is positive for atypical bacterial infection (Bordetella pertussis, Chlamydophila pneumoniae, or Mycoplasma pneumoniae), consider antibiotic de-escalation to target atypical bacterial infection.    COVID PRE-OP / PRE-PROCEDURE SCREENING ORDER (NO ISOLATION) - Swab, Nasopharynx [[id number]]  (Normal) Collected: 08/19/22 1952    Lab Status: Final result Specimen: Swab from Nasopharynx Updated: 08/19/22 2050    Narrative:      The following orders were created for panel order COVID PRE-OP / PRE-PROCEDURE SCREENING ORDER (NO ISOLATION) - Swab, Nasopharynx.  Procedure                               Abnormality         Status                     ---------                               -----------         ------                     COVID-19 and FLU A/B PCR...[[id number]]  Normal              Final result                 Please view results for these tests on the individual orders.    COVID-19 and FLU A/B PCR - Swab, Nasopharynx [[id number]]  (Normal) Collected: 08/19/22 1952     Lab Status: Final result Specimen: Swab from Nasopharynx Updated: 08/19/22 2050     COVID19 Not Detected     Influenza A PCR Not Detected     Influenza B PCR Not Detected    Narrative:      Fact sheet for providers: https://www.fda.gov/media/842583/download    Fact sheet for patients: https://www.fda.gov/media/710327/download    Test performed by PCR.          CT Abdomen Pelvis Without Contrast    Result Date: 8/20/2022  EXAMINATION: CT SCAN OF THE CHEST, ABDOMEN AND PELVIS WITHOUT INTRAVENOUS CONTRAST DATE OF EXAM: 8/20/2022 1:15 AM HISTORY: Altered mental status, dyspnea, status post fall, bilateral anterior/lateral rib pain, generalized abdominal pain COMPARISON: None. TECHNIQUE: CT examination of the chest, abdomen and pelvis was performed without intravenous contrast. CT dose lowering techniques were used, to include: automated exposure control, adjustment for patient size, and/or use of iterative reconstruction. Note: The exam is limited because some types of pathology may not be adequately demonstrated due to lack of contrast enhancement. FINDINGS: CHEST: Lungs:   Moderate peripheral tree-in-bud nodularity Alyse in the left upper lobe and left lower lobe but also seen in the right middle lobe. Pleura: Trace left pleural effusion. No pneumothorax. Mediastinum And Danette: Subcentimeter lymph nodes Cardiovascular: Extensive coronary artery atherosclerotic calcifications. No thoracic aortic aneurysm. Chest Wall:  Normal. ABDOMEN/PELVIS: Liver: There is liver surface nodularity and thick overlying. Gallbladder/Billary: Gallstones Pancreas: Trace fat stranding about tail of pancreas. Spleen: Normal. Adrenal Glands: Normal. Kidneys: No hydronephrosis or radiopaque urinary tract calculus. GI Tract: No bowel dilation or wall thickening. Diffuse colonic diverticulosis. Mesentery/Peritoneum: Normal. Vasculature: Moderate atherosclerotic calcifications. No abdominal aortic aneurysm. Lymph Nodes: Normal. Abdominal Wall:  Minor subcutaneous fat stranding bilateral anterior abdominal wall. Bladder: Normal. Reproductive: Normal. MUSCULOSKELETAL: Degenerative changes in the spine. No acute osseous abnormality     Impression: 1.  Trace fat stranding about tail of pancreas, nonspecific. Recommend correlation with serum lipase to exclude acute pancreatitis. 2.  Minor subcutaneous fat stranding bilateral anterior abdominal wall. Findings may represent acute subacute contusions related to fall versus from prior injections. 3.  Moderate peripheral tree-in-bud nodularity left lung greater than right differential considerations include chronic aspiration, infectious bronchiolitis, inflammatory bronchiolitis. 4.  Trace left pleural effusion. 5.  No other acute abnormality within limitations of unenhanced exam. 6.  Minor chronic liver disease. 7.  Other chronic findings as above. Electronically signed by:  aNder Barnes M.D.  8/19/2022 11:54 PM Mountain Time    XR Ribs Bilateral 3 View    Result Date: 8/19/2022  DATE OF EXAM: 8/19/2022 4:45 PM  PROCEDURE: XR RIBS BILATERAL 3 VW-  INDICATIONS: FALL  COMPARISON: No comparisons available.  TECHNIQUE: Three radiologic views of the bilateral ribs were obtained.  FINDINGS: AP chest radiograph shows the heart and vasculature to appear enlarged. There are mild diffuse pulmonary interstitial changes which may be chronic, and there appears to be mild left basilar pleural effusion or pleural thickening. Dedicated images show no right or left rib fracture or significant deformity. There is limited evaluation of the lower ribs due to superimposed dense soft tissues of the upper abdomen. Upper abdominal atherosclerotic calcifications are seen, at least of the splenic artery. Several calcifications in the right mid abdomen may represent gallstones.      Impression:  1. No visible rib fracture. 2. Mild chronic appearing interstitial lung changes and focal left basilar atelectasis or pneumonia. 3. Right upper  quadrant calcifications, possibly gallstones.  4. Mild cardiomegaly and pulmonary venous hypertension.  This report was finalized on 8/19/2022 5:55 PM by Dr. Akbar Yung MD.      CT Head Without Contrast    Result Date: 8/19/2022  DATE OF EXAM: 8/19/2022 6:49 PM  PROCEDURE: CT HEAD WO CONTRAST-  INDICATIONS: AMS  COMPARISON: No comparisons available.  TECHNIQUE: Routine transaxial and coronal reconstruction images were obtained through the head without the administration of contrast. Automated exposure control and iterative reconstruction methods were used.  The radiation dose reduction device was turned on for each scan per the ALARA (As Low as Reasonably Achievable) protocol.  FINDINGS: The calvarium appears intact. Included paranasal sinuses and mastoids appear clear. There is expected degree of generalized cerebral atrophy for the patient's age. There is no evidence of intracranial hemorrhage, contusion, edema, mass or mass effect, acute infarction, hydrocephalus, or abnormal extra-axial collection. There is moderately extensive, symmetric bilateral white matter disease, greater in the frontal lobes. Incidental note is made of apparently nonmetallic left scleral band. Orbits otherwise appear unremarkable.      Impression: Chronic appearing changes of the aging brain. No evidence of acute intracranial disease is seen.  This report was finalized on 8/19/2022 8:23 PM by Dr. Akbar Yung MD.      CT Chest Without Contrast Diagnostic    Result Date: 8/20/2022  EXAMINATION: CT SCAN OF THE CHEST, ABDOMEN AND PELVIS WITHOUT INTRAVENOUS CONTRAST DATE OF EXAM: 8/20/2022 1:15 AM HISTORY: Altered mental status, dyspnea, status post fall, bilateral anterior/lateral rib pain, generalized abdominal pain COMPARISON: None. TECHNIQUE: CT examination of the chest, abdomen and pelvis was performed without intravenous contrast. CT dose lowering techniques were used, to include: automated exposure control, adjustment for patient size,  and/or use of iterative reconstruction. Note: The exam is limited because some types of pathology may not be adequately demonstrated due to lack of contrast enhancement. FINDINGS: CHEST: Lungs:   Moderate peripheral tree-in-bud nodularity Alyse in the left upper lobe and left lower lobe but also seen in the right middle lobe. Pleura: Trace left pleural effusion. No pneumothorax. Mediastinum And Danette: Subcentimeter lymph nodes Cardiovascular: Extensive coronary artery atherosclerotic calcifications. No thoracic aortic aneurysm. Chest Wall:  Normal. ABDOMEN/PELVIS: Liver: There is liver surface nodularity and thick overlying. Gallbladder/Billary: Gallstones Pancreas: Trace fat stranding about tail of pancreas. Spleen: Normal. Adrenal Glands: Normal. Kidneys: No hydronephrosis or radiopaque urinary tract calculus. GI Tract: No bowel dilation or wall thickening. Diffuse colonic diverticulosis. Mesentery/Peritoneum: Normal. Vasculature: Moderate atherosclerotic calcifications. No abdominal aortic aneurysm. Lymph Nodes: Normal. Abdominal Wall: Minor subcutaneous fat stranding bilateral anterior abdominal wall. Bladder: Normal. Reproductive: Normal. MUSCULOSKELETAL: Degenerative changes in the spine. No acute osseous abnormality     Impression: 1.  Trace fat stranding about tail of pancreas, nonspecific. Recommend correlation with serum lipase to exclude acute pancreatitis. 2.  Minor subcutaneous fat stranding bilateral anterior abdominal wall. Findings may represent acute subacute contusions related to fall versus from prior injections. 3.  Moderate peripheral tree-in-bud nodularity left lung greater than right differential considerations include chronic aspiration, infectious bronchiolitis, inflammatory bronchiolitis. 4.  Trace left pleural effusion. 5.  No other acute abnormality within limitations of unenhanced exam. 6.  Minor chronic liver disease. 7.  Other chronic findings as above. Electronically signed by:  Nader  CIARA Barnes  8/19/2022 11:58 PM Mountain Time      Results for orders placed during the hospital encounter of 04/16/18    Adult Transthoracic Echo Complete W/ Cont if Necessary Per Protocol    Interpretation Summary  · Left ventricular wall thickness is consistent with mild concentric hypertrophy.  · Left ventricular diastolic dysfunction (grade I a) consistent with impaired relaxation.  · There is mild calcification of the aortic valve.  · Mild mitral valve regurgitation is present  · Mild tricuspid valve regurgitation is present.  · Calculated right ventricular systolic pressure from tricuspid regurgitation is 79 mmHg.  · There is a small (<1cm) pericardial effusion.  · Calculated EF = 56%. Normal left ventricular cavity size noted. All left ventricular wall segments contract normally. asynchronous septal motion with mild hypokinesis of inferior wall Left ventricular wall thickness is consistent with mild concentric hypertrophy. Left ventricular diastolic dysfunction is noted (grade I a w/high LAP) consistent with impaired relaxation.      I have reviewed the medications:  Scheduled Meds:aspirin, 81 mg, Oral, Daily  atorvastatin, 10 mg, Oral, Nightly  dilTIAZem CD, 240 mg, Oral, Daily  doxycycline, 100 mg, Intravenous, Q12H  heparin (porcine), 5,000 Units, Subcutaneous, Q8H  hydrALAZINE, 25 mg, Oral, BID  lactobacillus acidophilus, 1 capsule, Oral, Daily  levothyroxine, 100 mcg, Oral, Q AM  metoprolol tartrate, 100 mg, Oral, BID  multivitamin with minerals, 1 tablet, Oral, Daily  pantoprazole, 40 mg, Oral, Nightly  piperacillin-tazobactam, 3.375 g, Intravenous, Q8H  sodium chloride, 10 mL, Intravenous, Q12H      Continuous Infusions:   PRN Meds:.•  acetaminophen  •  dextrose  •  dextrose  •  glucagon (human recombinant)  •  Sodium Chloride (PF)  •  sodium chloride    Assessment & Plan   Assessment & Plan     Active Hospital Problems    Diagnosis  POA   • **Recurrent UTI [N39.0]  Yes   • Cough [R05.9]  Unknown  "  • Altered mental status, unspecified altered mental status type [R41.82]  Yes   • Dementia ([facility]) [F03.90]  Yes   • Stage 3b chronic kidney disease ([facility]) [N18.32]  Yes   • Chronic diastolic CHF (congestive heart failure) ([facility]) [I50.32]  Yes   • GERD without esophagitis [K21.9]  Yes   • UTI (urinary tract infection) [N39.0]  Yes   • Coronary artery disease involving native coronary artery of native heart without angina pectoris [I25.10]  Yes   • Dyslipidemia [E78.5]  Yes   • Essential hypertension [I10]  Yes   • Paroxysmal atrial fibrillation ([facility]) [I48.0]  Yes   • Type 2 diabetes mellitus without complication, with long-term current use of insulin ([facility]) [E11.9, Z79.4]  Not Applicable      Resolved Hospital Problems   No resolved problems to display.        Brief Hospital Course to date:  Ninoska Gloria is a 89 y.o. female w h/o dementia, CKD, CHFpEF who presents with concern for increasing confusion for 1-2 days. Has been treated with augmentin multiple times outpatient for UTIs with persistent concern for UTI. Urinalysis here concerning.    1) Altered mental status in setting of dementia  -daughter reports \"informal diagnosis\" of dementia. Has good days/bad days, confusion about past events and at time in present. Now at baseline per daughter  -discussed with daughter progression of disease in dementia and how acute worsening could or could not be 2/2 UTI at this time in the setting of her dementia - she voices understanding    2) Pyuria, concern for UTI in setting of AMS  -s/p multiple outpatient treatments. Some resistance patterns on previous urine cultures.  -given this, will await urine culture results and continue zosyn (which previous UTIs were susceptible too) in case of resistant organism  -not septic  -PVR today to r/o retention    3) Elevated BP in setting of chronic HTN  -asymptomatic. Restarting home med regimen this AM and monitoring  -given advanced age and patient's asx status, will slowly titrate " PO medications    DMII c/b hypoglycemia as inpatinet- hold home glipizide. Hold SSI given hypoglycemia and advanced age    CAD - continue home ASA  CHFpEF - appears euvolemic. Dc IVF, encourage PO intake  Hypothyroidism - continue home levothyroxine    Expected Discharge Location and Transportation: home  Expected Discharge Date: 8/21    DVT prophylaxis:  Medical DVT prophylaxis orders are present.     AM-PAC 6 Clicks Score (PT): 15 (08/20/22 0852)    CODE STATUS:   Code Status and Medical Interventions:   Ordered at: 08/19/22 8307     Medical Intervention Limits:    NO intubation (DNI)     Level Of Support Discussed With:    Next of Kin (If No Surrogate)     Code Status (Patient has no pulse and is not breathing):    No CPR (Do Not Attempt to Resuscitate)     Medical Interventions (Patient has pulse or is breathing):    Limited Support       Aziza Garcia MD  08/20/22

## 2022-08-20 NOTE — H&P
"    [facility]  HISTORY AND PHYSICAL    Patient Name: Ninoska Gloria  : 1931  MRN: [id number]  Primary Care Physician: Lizette Fitzpatrick PA  Date of admission: 2022    Subjective   Subjective     Chief Complaint:  Altered mental status    HPI:  Ninoska Gloria is a 89 y.o. female with PMH significant for anxiety/depression, skin cancer, diastolic CHF, cirrhosis, CKD 3, COPD, CAD, DM 2, chronic diarrhea, GERD, HTN, HLD, mild dementia who presents to the ED with complaint of altered mental status.  Her daughter who is at bedside helps provide HPI.  She notes that she was sleeping and heard the door slam.  By the time she made it to the door, she reports that her mom was nowhere in sight.  She states that it took over an hour to locate her and she was found walking in the woods.  The daughter reports that she has never done anything like that before.  She does have mild dementia but is normally fairly independent.  Her daughters take turns staying with her at her house.  The patient states that she wanted to go for a walk and thought that her daughter was at [employer] today.  She also notes that she fell and was \"sore all over.\"  The patient was recently treated for UTI with Augmentin which was completed 8/10/2022.  Her daughter reports that she also was treated 3 weeks prior with Augmentin then as well.  Upon arrival to the ED, UA is concerning for UTI.  X-ray of ribs is negative.  Her renal function is elevated from baseline.  She will be admitted to hospital medicine for further evaluation.    Review of Systems   Constitutional: Negative for activity change, appetite change, chills, diaphoresis, fatigue, fever and unexpected weight change.   HENT: Negative.    Eyes: Negative.  Negative for visual disturbance.   Respiratory: Negative.  Negative for cough, chest tightness, shortness of breath and wheezing.    Cardiovascular: Negative.  Negative for chest pain, " "palpitations and leg swelling.   Gastrointestinal: Positive for abdominal pain and diarrhea. Negative for blood in stool, nausea and vomiting.   Endocrine: Negative.  Negative for polyuria.   Genitourinary: Positive for dysuria and frequency. Negative for decreased urine volume, difficulty urinating and urgency.   Musculoskeletal: Positive for arthralgias, back pain and gait problem. Negative for myalgias and neck pain.   Skin: Negative.  Negative for color change, pallor, rash and wound.   Allergic/Immunologic: Negative.  Negative for immunocompromised state.   Neurological: Negative for dizziness, speech difficulty, weakness, light-headedness, numbness and headaches.   Hematological: Negative.  Does not bruise/bleed easily.   Psychiatric/Behavioral: Positive for confusion. The patient is not nervous/anxious.         All other systems reviewed and are negative.     Personal History     Past Medical History:   Diagnosis Date   • Abnormal ECG    • Anemia    • Anxiety and depression    • Arrhythmia    • Atrial fibrillation (HCC)     Daughter reported \"in the past\"   • Back pain    • Cancer (HCC)     Daughter reported skin cancer removed from nose in the past   • CHF (congestive heart failure) (HCC)    • Cirrhosis (HCC)    • CKD (chronic kidney disease), stage III (HCC)     \"Renal failure stage 3\"   • Constipation     Reported intermittent episodes   • COPD (chronic obstructive pulmonary disease) (HCC)    • Coronary artery disease     Patient daughter reported 2 coronary stents   • COVID-19 09/2021   • Diabetes mellitus (HCC) 1990's   • Diarrhea     Reported intermittent episodes   • Disease of thyroid gland    • Elevated cholesterol    • Full dentures     Patient's daughter advised no adhesives DOS   • GERD (gastroesophageal reflux disease)    • Headache    • Heart murmur    • High triglycerides    • History of blood transfusion     Patient's daughter reported unsure but this was previously noted in patients record "   • History of bronchitis    • History of pneumonia    • [place] (hard of hearing)     Patient's daughter reported severely [place] and that patient has hearing aids but doesn't wear them   • Hyperlipidemia    • Hypertension    • Iron deficiency    • Myocardial infarction (HCC)     Patient's daughter reported placement of 2 stents   • Osteoarthritis    • Ovarian cyst    • Pneumonia    • Retinal detachment    • Shingles     about to finish treatment up    • SOB (shortness of breath)    • Vision problems          Past Surgical History:   Procedure Laterality Date   • CARDIAC CATHETERIZATION      Patient's daughter reported placement of 2 stents   • CATARACT EXTRACTION Bilateral ,    •  SECTION      x 5: 1948, 1950, , ,    • COLONOSCOPY     • CORONARY STENT PLACEMENT     • ENDOSCOPY N/A 2021    Procedure: ESOPHAGOGASTRODUODENOSCOPY WITH BIOPSY;  Surgeon: Marlon Dixon MD;  Location: [facility] ENDOSCOPY;  Service: Gastroenterology;  Laterality: N/A;   • MOUTH SURGERY      full mouth extraction   • TUBAL ABDOMINAL LIGATION         Family History:  family history includes Heart attack in her sister; Liver cancer in her brother. Otherwise pertinent FHx was reviewed and unremarkable.     Social History:  reports that she has never smoked. She has never used smokeless tobacco. She reports that she does not drink alcohol and does not use drugs.  Social History     Social History Narrative   • Not on file       Medications:  Calcium Carbonate Antacid, Flaxseed Oil, Insulin Detemir, Magnesium, O2, acetaminophen, aspirin, cetirizine, cholestyramine, dilTIAZem CD, fish oil, furosemide, glipizide, hydrALAZINE, iron polysaccharides, levothyroxine, metoprolol tartrate, multivitamin with minerals, mupirocin, nitroglycerin, pantoprazole, simvastatin, triamcinolone, vitamin B-12, vitamin C, and vitamin D3    No Known Allergies    Objective   Objective     Vital Signs:   Temp:   "[97.8 °F (36.6 °C)-97.9 °F (36.6 °C)] 97.8 °F (36.6 °C)  Heart Rate:  [59-62] 59  Resp:  [18] 18  BP: (177-184)/(61-70) 180/61    Physical Exam   Constitutional: Awake, alert, talkative   Eyes: PERRLA, sclerae anicteric, no conjunctival injection  HENT: NCAT, mucous membranes moist  Neck: Supple, no thyromegaly, no lymphadenopathy, trachea midline  Respiratory: Clear to auscultation bilaterally, nonlabored respirations   Cardiovascular: RRR, no murmurs, rubs, or gallops, palpable pedal pulses bilaterally  Gastrointestinal: Positive bowel sounds, soft, \"soreness\" with palpation, nondistended, mild distention   Musculoskeletal: mild bilateral ankle edema, no clubbing or cyanosis to extremities  Psychiatric: Appropriate affect, cooperative  Neurologic: Oriented x 3, strength symmetric in all extremities, Cranial Nerves grossly intact to confrontation, speech clear  Skin: No rashes, multiple diffuse skin nodules with areas of scabbing secondary to known areas of skin cancer       Results Reviewed:  I have personally reviewed most recent indicated data and agree with findings including:  [x]  Laboratory  [x]  Radiology  []  EKG/Telemetry  []  Pathology  []  Cardiac/Vascular Studies  [x]  Old records  []  Other:  Most pertinent findings include:      LAB RESULTS:      Lab 08/19/22  1631   WBC 9.73   HEMOGLOBIN 12.1   HEMATOCRIT 37.0   PLATELETS 233   NEUTROS ABS 6.62   IMMATURE GRANS (ABS) 0.03   LYMPHS ABS 1.77   MONOS ABS 1.14*   EOS ABS 0.15   MCV 89.6   PROCALCITONIN 0.09   LACTATE 1.0   *         Lab 08/19/22  1631   SODIUM 141   POTASSIUM 4.1   CHLORIDE 105   CO2 23.0   ANION GAP 13.0   BUN 36*   CREATININE 1.42*   EGFR 35.0*   GLUCOSE 99   CALCIUM 9.8*         Lab 08/19/22  1631   TOTAL PROTEIN 7.3   ALBUMIN 3.90   GLOBULIN 3.4   ALT (SGPT) 24   AST (SGOT) 30   BILIRUBIN 0.2   ALK PHOS 100   LIPASE 40                     Brief Urine Lab Results  (Last result in the past 365 days)      Color   Clarity   Blood "   Leuk Est   Nitrite   Protein   CREAT   Urine HCG        08/19/22 1950 Yellow   Clear   Negative   Small (1+)   Negative   100 mg/dL (2+)               Microbiology Results (last 10 days)     Procedure Component Value - Date/Time    COVID PRE-OP / PRE-PROCEDURE SCREENING ORDER (NO ISOLATION) - Swab, Nasopharynx [[id number]]  (Normal) Collected: 08/19/22 1952    Lab Status: Final result Specimen: Swab from Nasopharynx Updated: 08/19/22 2050    Narrative:      The following orders were created for panel order COVID PRE-OP / PRE-PROCEDURE SCREENING ORDER (NO ISOLATION) - Swab, Nasopharynx.  Procedure                               Abnormality         Status                     ---------                               -----------         ------                     COVID-19 and FLU A/B PCR...[[id number]]  Normal              Final result                 Please view results for these tests on the individual orders.    COVID-19 and FLU A/B PCR - Swab, Nasopharynx [[id number]]  (Normal) Collected: 08/19/22 1952    Lab Status: Final result Specimen: Swab from Nasopharynx Updated: 08/19/22 2050     COVID19 Not Detected     Influenza A PCR Not Detected     Influenza B PCR Not Detected    Narrative:      Fact sheet for providers: https://www.fda.gov/media/729746/download    Fact sheet for patients: https://www.fda.gov/media/913023/download    Test performed by PCR.          XR Ribs Bilateral 3 View    Result Date: 8/19/2022  DATE OF EXAM: 8/19/2022 4:45 PM  PROCEDURE: XR RIBS BILATERAL 3 VW-  INDICATIONS: FALL  COMPARISON: No comparisons available.  TECHNIQUE: Three radiologic views of the bilateral ribs were obtained.  FINDINGS: AP chest radiograph shows the heart and vasculature to appear enlarged. There are mild diffuse pulmonary interstitial changes which may be chronic, and there appears to be mild left basilar pleural effusion or pleural thickening. Dedicated images show no right or left rib fracture or significant deformity.  There is limited evaluation of the lower ribs due to superimposed dense soft tissues of the upper abdomen. Upper abdominal atherosclerotic calcifications are seen, at least of the splenic artery. Several calcifications in the right mid abdomen may represent gallstones.      Impression:  1. No visible rib fracture. 2. Mild chronic appearing interstitial lung changes and focal left basilar atelectasis or pneumonia. 3. Right upper quadrant calcifications, possibly gallstones.  4. Mild cardiomegaly and pulmonary venous hypertension.  This report was finalized on 8/19/2022 5:55 PM by Dr. Akbar Yung MD.      CT Head Without Contrast    Result Date: 8/19/2022  DATE OF EXAM: 8/19/2022 6:49 PM  PROCEDURE: CT HEAD WO CONTRAST-  INDICATIONS: AMS  COMPARISON: No comparisons available.  TECHNIQUE: Routine transaxial and coronal reconstruction images were obtained through the head without the administration of contrast. Automated exposure control and iterative reconstruction methods were used.  The radiation dose reduction device was turned on for each scan per the ALARA (As Low as Reasonably Achievable) protocol.  FINDINGS: The calvarium appears intact. Included paranasal sinuses and mastoids appear clear. There is expected degree of generalized cerebral atrophy for the patient's age. There is no evidence of intracranial hemorrhage, contusion, edema, mass or mass effect, acute infarction, hydrocephalus, or abnormal extra-axial collection. There is moderately extensive, symmetric bilateral white matter disease, greater in the frontal lobes. Incidental note is made of apparently nonmetallic left scleral band. Orbits otherwise appear unremarkable.      Impression: Chronic appearing changes of the aging brain. No evidence of acute intracranial disease is seen.  This report was finalized on 8/19/2022 8:23 PM by Dr. Akbar Yung MD.        Results for orders placed during the hospital encounter of 04/16/18    Adult Transthoracic Echo  "Complete W/ Cont if Necessary Per Protocol    Interpretation Summary  · Left ventricular wall thickness is consistent with mild concentric hypertrophy.  · Left ventricular diastolic dysfunction (grade I a) consistent with impaired relaxation.  · There is mild calcification of the aortic valve.  · Mild mitral valve regurgitation is present  · Mild tricuspid valve regurgitation is present.  · Calculated right ventricular systolic pressure from tricuspid regurgitation is 79 mmHg.  · There is a small (<1cm) pericardial effusion.  · Calculated EF = 56%. Normal left ventricular cavity size noted. All left ventricular wall segments contract normally. asynchronous septal motion with mild hypokinesis of inferior wall Left ventricular wall thickness is consistent with mild concentric hypertrophy. Left ventricular diastolic dysfunction is noted (grade I a w/high LAP) consistent with impaired relaxation.      Assessment & Plan   Assessment & Plan       Recurrent UTI    Coronary artery disease involving native coronary artery of native heart without angina pectoris    Essential hypertension    Dyslipidemia    Type 2 diabetes mellitus without complication, with long-term current use of insulin (HCC)    Paroxysmal atrial fibrillation (HCC)    Dementia (HCC)    Stage 3b chronic kidney disease (HCC)    Chronic diastolic CHF (congestive heart failure) (HCC)    GERD without esophagitis    UTI (urinary tract infection)    Cough    89 y.o. female with PMH significant for anxiety/depression, skin cancer, diastolic CHF, cirrhosis, CKD 3, COPD, CAD, DM 2, chronic diarrhea, GERD, HTN, HLD, mild dementia who presents to the ED with complaint of altered mental status who was found to have concern for recurrent UTI.    Recurrent UTI  - 2 recent rounds of Augmentin  - Urine culture pending  - Zosyn started for now  - BC x2 pending  -Complains of \"pressure\" we will do PVRs to assess for retention  -CT abdomen pelvis pending  - CBC, BMP in the " a.m.  - Lactic and Pro-Hieu WNL  - Probiotic given recent multiple rounds of antibiotic use    Cough  - Questionable pneumonia on rib x-ray  - CT chest pending    Renal insufficiency  CKD III  - Baseline creatinine ~1.12, now 1.4 to  - Gentle IVF overnight  - PVR    Dementia  - Mild  - Family stays with her    Diabetes mellitus 2  - FSBG ACH S  - Hold levemir for now as BG was 99, consider adding back as appropriate   - SS insulin  - Hold glipizide    Essential hypertension  Hyperlipidemia  CAD  - Continue daily ASA  - Continue diltiazem, hydralazine, metoprolol  - Continue Zocor    CHF  - Hold Lasix pending repeat BMP in the a.m. secondary to increasing renal function  - Daily weight    Hypothyroid  - Continue levothyroxine  - TSH in the a.m.    GERD  - Continue PPI    DVT prophylaxis: Heparin    CODE STATUS: Discussed with daughter at bedside  Medical Intervention Limits: NO intubation (DNI)  Level Of Support Discussed With: Next of Kin (If No Surrogate)  Code Status (Patient has no pulse and is not breathing): No CPR (Do Not Attempt to Resuscitate)  Medical Interventions (Patient has pulse or is breathing): Limited Support      This note has been completed as part of a split-shared workflow.     Signature:  Electronically signed by AKBAR Davidson, 08/19/22, 10:30 PM EDT.      Attending   Admission Attestation       I have performed an independent face-to-face diagnostic evaluation including performing an independent physical examination as documented here.  The documented plan of care above was reviewed and developed with the advanced practice clinician (APC).      Brief Summary Statement:   Ninoska Gloria is a 89 y.o. female  with a PMH significant for diastolic CHF, cirrhosis, CKD stage III, anxiety and depression, COPD, CAD, diabetes mellitus type 2, GERD, HTN, HLD, and mild dementia who comes to the ED due to altered mental status.  Family at bedside provides HPI.  Daughter says that patient left the  home today while her daughter was still asleep.  She heard the door slammed.  By the time the daughter got outside, she could not find the patient.  She was lost and with no other home for around an hour.  Patient reports a fall which she attributes to chronic poor balance.  She was able to get up on her own, reports some residual abdominal and rib pain.  Patient has had 2 recent urinary tract infections treated outpatient with Augmentin.  Patient complains of continued pelvic pressure which is common with her UTIs.  She has had a new cough ongoing for the past several days.  Daughter reports poor appetite.  No reports of fever.  Remainder of detailed HPI is as noted by APC and has been reviewed and/or edited by me for completeness.    Attending Physical Exam:  Temp:  [97.8 °F (36.6 °C)-97.9 °F (36.6 °C)] 97.8 °F (36.6 °C)  Heart Rate:  [59-62] 59  Resp:  [18] 18  BP: (177-184)/(61-70) 180/61    Constitutional: Awake, alert  Eyes: PERRLA, sclerae anicteric, no conjunctival injection  HENT: NCAT, mucous membranes moist  Neck: Supple, no thyromegaly, no lymphadenopathy, trachea midline  Respiratory: Inspiratory crackles on the left, nonlabored respirations   Cardiovascular: RRR, no murmurs, rubs, or gallops, palpable pedal pulses bilaterally  Gastrointestinal: Positive bowel sounds, soft, nontender, nondistended  Musculoskeletal: No bilateral ankle edema, no clubbing or cyanosis to extremities  Psychiatric: Appropriate affect, cooperative  Neurologic: Oriented x 3, strength symmetric in all extremities, Cranial Nerves grossly intact to confrontation, speech clear  Skin: No rashes      Brief Assessment/Plan :  See detailed assessment and plan developed with APC which I have reviewed and/or edited for completeness.      Natali Vizcarra,   08/20/22

## 2022-08-21 ENCOUNTER — READMISSION MANAGEMENT (OUTPATIENT)
Dept: CALL CENTER | Facility: HOSPITAL | Age: 87
End: 2022-08-21

## 2022-08-21 VITALS
RESPIRATION RATE: 20 BRPM | TEMPERATURE: 98.4 F | DIASTOLIC BLOOD PRESSURE: 60 MMHG | BODY MASS INDEX: 25.78 KG/M2 | OXYGEN SATURATION: 92 % | HEIGHT: 60 IN | SYSTOLIC BLOOD PRESSURE: 162 MMHG | HEART RATE: 53 BPM | WEIGHT: 131.3 LBS

## 2022-08-21 PROBLEM — R41.82 ALTERED MENTAL STATUS, UNSPECIFIED ALTERED MENTAL STATUS TYPE: Status: RESOLVED | Noted: 2022-08-20 | Resolved: 2022-08-21

## 2022-08-21 PROBLEM — E83.42 HYPOMAGNESEMIA: Status: RESOLVED | Noted: 2022-08-20 | Resolved: 2022-08-21

## 2022-08-21 LAB
BACTERIA SPEC AEROBE CULT: ABNORMAL
GLUCOSE BLDC GLUCOMTR-MCNC: 151 MG/DL (ref 70–130)
GLUCOSE BLDC GLUCOMTR-MCNC: 222 MG/DL (ref 70–130)

## 2022-08-21 PROCEDURE — 97602 WOUND(S) CARE NON-SELECTIVE: CPT

## 2022-08-21 PROCEDURE — 82962 GLUCOSE BLOOD TEST: CPT

## 2022-08-21 PROCEDURE — 25010000002 HEPARIN (PORCINE) PER 1000 UNITS: Performed by: NURSE PRACTITIONER

## 2022-08-21 PROCEDURE — 96366 THER/PROPH/DIAG IV INF ADDON: CPT

## 2022-08-21 PROCEDURE — 25010000002 PIPERACILLIN SOD-TAZOBACTAM PER 1 G

## 2022-08-21 PROCEDURE — G0378 HOSPITAL OBSERVATION PER HR: HCPCS

## 2022-08-21 PROCEDURE — 96372 THER/PROPH/DIAG INJ SC/IM: CPT

## 2022-08-21 PROCEDURE — 99217 PR OBSERVATION CARE DISCHARGE MANAGEMENT: CPT | Performed by: INTERNAL MEDICINE

## 2022-08-21 RX ORDER — CEFDINIR 300 MG/1
300 CAPSULE ORAL 2 TIMES DAILY
Qty: 8 CAPSULE | Refills: 0 | Status: SHIPPED | OUTPATIENT
Start: 2022-08-21 | End: 2022-08-25

## 2022-08-21 RX ADMIN — ASPIRIN 81 MG: 81 TABLET, COATED ORAL at 08:10

## 2022-08-21 RX ADMIN — TAZOBACTAM SODIUM AND PIPERACILLIN SODIUM 3.38 G: 375; 3 INJECTION, SOLUTION INTRAVENOUS at 05:23

## 2022-08-21 RX ADMIN — Medication 1 CAPSULE: at 08:10

## 2022-08-21 RX ADMIN — Medication 10 ML: at 08:10

## 2022-08-21 RX ADMIN — LEVOTHYROXINE SODIUM 100 MCG: 0.1 TABLET ORAL at 06:11

## 2022-08-21 RX ADMIN — MULTIPLE VITAMINS W/ MINERALS TAB 1 TABLET: TAB at 08:09

## 2022-08-21 RX ADMIN — ACETAMINOPHEN 650 MG: 325 TABLET, FILM COATED ORAL at 08:15

## 2022-08-21 RX ADMIN — HYDRALAZINE HYDROCHLORIDE 50 MG: 50 TABLET, FILM COATED ORAL at 08:10

## 2022-08-21 RX ADMIN — DOXYCYCLINE 100 MG: 100 INJECTION, POWDER, LYOPHILIZED, FOR SOLUTION INTRAVENOUS at 06:11

## 2022-08-21 RX ADMIN — DILTIAZEM HYDROCHLORIDE 240 MG: 240 CAPSULE, COATED, EXTENDED RELEASE ORAL at 08:10

## 2022-08-21 RX ADMIN — NYSTATIN: 100000 POWDER TOPICAL at 08:10

## 2022-08-21 RX ADMIN — HEPARIN SODIUM 5000 UNITS: 5000 INJECTION INTRAVENOUS; SUBCUTANEOUS at 06:11

## 2022-08-21 NOTE — PROGRESS NOTES
Malnutrition Severity Assessment    Patient Name:  Ninoska Gloria  YOB: 1931  MRN: [id number]  Admit Date:  8/19/2022    Patient meets criteria for : Moderate (non-severe) Malnutrition (Pt meets criteria for non severe chronic malnutrition based on intake hx w some muscle wasting.)    Comments:      Malnutrition Severity Assessment  Malnutrition Type: Chronic Disease - Related Malnutrition  Malnutrition Type (last 8 hours)     Malnutrition Severity Assessment     Row Name 08/21/22 1407       Malnutrition Severity Assessment    Malnutrition Type Chronic Disease - Related Malnutrition    Row Name 08/21/22 1407       Insufficient Energy Intake     Insufficient Energy Intake Findings Severe    Insufficient Energy Intake  <75% of est. energy requirement for > or equal to 1 month    Row Name 08/21/22 1407       Unintentional Weight Loss     Unintentional Weight Loss Findings --  loss and gain    Row Name 08/21/22 1407       Muscle Loss    Loss of Muscle Mass Findings Mild    Nondenominational Region Moderate - slight depression    Clavicle Bone Region None    Acromion Bone Region None    Scapular Bone Region --  mild    Dorsal Hand Region Moderate - slight depression    Patellar Region Moderate - patella more prominent, less muscle definition around patella    Anterior Thigh Region --  mild    Posterior Calf Region Moderate - some roundness, slight firmness    Row Name 08/21/22 1407       Fat Loss    Subcutaneous Fat Loss Findings --  unable to determine w/o baseline    Row Name 08/21/22 1407       Criteria Met (Must meet criteria for severity in at least 2 of these categories: M Wasting, Fat Loss, Fluid, Secondary Signs, Wt. Status, Intake)    Patient meets criteria for  Moderate (non-severe) Malnutrition  Pt meets criteria for non severe chronic malnutrition based on intake hx w some muscle wasting.                Electronically signed by:  Bobbi Medrano RD  08/21/22 14:18 EDT

## 2022-08-21 NOTE — DISCHARGE SUMMARY
[facility] Medicine Services  DISCHARGE SUMMARY    Patient Name: Ninoska Gloria  : 1931  MRN: [id number]    Date of Admission: 2022  6:59 PM  Date of Discharge: 2022  Primary Care Physician: Lizette Fitzpatrick PA    Consults     No orders found from 2022 to 2022.          Hospital Course     Presenting Problem:   UTI (urinary tract infection) [N39.0]  Altered mental status, unspecified altered mental status type [R41.82]    Active Hospital Problems    Diagnosis  POA   • **Recurrent UTI [N39.0]  Yes   • Dementia (HCC) [F03.90]  Yes   • Stage 3b chronic kidney disease (HCC) [N18.32]  Yes   • Chronic diastolic CHF (congestive heart failure) ([facility]) [I50.32]  Yes   • GERD without esophagitis [K21.9]  Yes   • UTI (urinary tract infection) [N39.0]  Yes   • Coronary artery disease involving native coronary artery of native heart without angina pectoris [I25.10]  Yes   • Dyslipidemia [E78.5]  Yes   • Essential hypertension [I10]  Yes   • Paroxysmal atrial fibrillation ([facility]) [I48.0]  Yes   • Type 2 diabetes mellitus without complication, with long-term current use of insulin ([facility]) [E11.9, Z79.4]  Not Applicable      Resolved Hospital Problems    Diagnosis Date Resolved POA   • Cough [R05.9] 2022 Unknown   • Altered mental status, unspecified altered mental status type [R41.82] 2022 Yes   • Hypomagnesemia [E83.42] 2022 Yes          Hospital Course:  Ninoska Gloria is a 89 y.o. female with h/o dementia, CKD, CHFpEF who presented with concern for increasing confusion for 1-2 days. Was treated with augmentin multiple times outpatient for UTIs with persistent concern for UTI.     On evaluation here, urinalysis concerning for repeat UTI. Patient was not septic, no signs of pyelonephritis, and was well-appearing and did not have clear urinary symptoms (though challenging to acquire given advanced age and confusion). Monitored patient on IV zosyn awaiting culture  "results given previous susceptibilities. Urine culture resulted on 8/21 with E coli susceptible to ceftriaxone, intermediate to augmentin, so cefdinir was selected for discharge to complete 5 day course.    Discussions with daughter revealed an \"informal diagnosis\" of dementia. Her mother frequently alternates between good days and bad days. We discussed the prognosis for dementia and its clinical course, including how at times the variation in mental status may be repeatedly attributed to UTI but indicate severity. They will follow-up with PCP and daughter understands.    Patient with high blood pressures during admission, will return home on home BP regimen as they log frequently and can follow-up with PCP.    Discharge Follow Up Recommendations for outpatient labs/diagnostics:  F/u with PCP    Day of Discharge     HPI:   Feeling \"herself\" today. No dysuria, urinary urgency. Improved energy, improved mental status, getting into/out of bed on her own.    Review of Systems  Gen- No fevers, chills  CV- No chest pain, palpitations  Resp- No cough, dyspnea  GI- No N/V/D, abd pain    Vital Signs:   Temp:  [97.6 °F (36.4 °C)-98.4 °F (36.9 °C)] 98.4 °F (36.9 °C)  Heart Rate:  [51-57] 53  Resp:  [18-22] 20  BP: (153-184)/(54-65) 162/60  Flow (L/min):  [1] 1      Physical Exam:  Constitutional: No acute distress, awake, alert older female. Daughter bedside  HENT: NCAT, mucous membranes moist  Respiratory: Clear to auscultation bilaterally, respiratory effort normal   Cardiovascular: RRR, no murmurs, rubs, or gallops. No LE edema  Gastrointestinal: Soft, nontender, nondistended  Musculoskeletal: Muscle tone within normal limits, no joint effusions appreciated  Psychiatric: Appropriate affect, cooperative  Neurologic: Alert and oriented x3, facial movements symmetric and spontaneous movement of all 4 extremities grossly equal bilaterally, speech clear  Skin: No rashes    Pertinent  and/or Most Recent Results     LAB " RESULTS:      Lab 08/20/22  0952 08/19/22  1631   WBC 8.41 9.73   HEMOGLOBIN 10.7* 12.1   HEMATOCRIT 33.8* 37.0   PLATELETS 180 233   NEUTROS ABS 5.29 6.62   IMMATURE GRANS (ABS) 0.02 0.03   LYMPHS ABS 1.61 1.77   MONOS ABS 1.16* 1.14*   EOS ABS 0.31 0.15   MCV 91.4 89.6   PROCALCITONIN  --  0.09   LACTATE  --  1.0   LDH  --  256*         Lab 08/20/22  1500 08/20/22  0952 08/19/22  1631   SODIUM 140  --  141   POTASSIUM 4.4  --  4.1   CHLORIDE 109*  --  105   CO2 21.0*  --  23.0   ANION GAP 10.0  --  13.0   BUN 25*  --  36*   CREATININE 1.12*  --  1.42*   EGFR 46.5*  --  35.0*   GLUCOSE 257*  --  99   CALCIUM 8.5  --  9.8*   MAGNESIUM 1.3*  --   --    HEMOGLOBIN A1C  --  6.20*  --    TSH 0.577  --   --          Lab 08/19/22  1631   TOTAL PROTEIN 7.3   ALBUMIN 3.90   GLOBULIN 3.4   ALT (SGPT) 24   AST (SGOT) 30   BILIRUBIN 0.2   ALK PHOS 100   LIPASE 40                     Brief Urine Lab Results  (Last result in the past 365 days)      Color   Clarity   Blood   Leuk Est   Nitrite   Protein   CREAT   Urine HCG        08/19/22 1950 Yellow   Clear   Negative   Small (1+)   Negative   100 mg/dL (2+)               Microbiology Results (last 10 days)     Procedure Component Value - Date/Time    COVID PRE-OP / PRE-PROCEDURE SCREENING ORDER (NO ISOLATION) - Swab, Nasopharynx [[id number]]  (Normal) Collected: 08/20/22 0506    Lab Status: Final result Specimen: Swab from Nasopharynx Updated: 08/20/22 0813    Narrative:      The following orders were created for panel order COVID PRE-OP / PRE-PROCEDURE SCREENING ORDER (NO ISOLATION) - Swab, Nasopharynx.  Procedure                               Abnormality         Status                     ---------                               -----------         ------                     Respiratory Panel PCR w/...[[id number]]  Normal              Final result                 Please view results for these tests on the individual orders.    Respiratory Panel PCR w/COVID-19(SARS-CoV-2)  ADAM/NATY/SHASTA/PAD/COR/MAD/KARLA In-House, NP Swab in UTM/VTM, 3-4 HR TAT - Swab, Nasopharynx [[id number]]  (Normal) Collected: 08/20/22 0506    Lab Status: Final result Specimen: Swab from Nasopharynx Updated: 08/20/22 0813     ADENOVIRUS, PCR Not Detected     Coronavirus 229E Not Detected     Coronavirus HKU1 Not Detected     Coronavirus NL63 Not Detected     Coronavirus OC43 Not Detected     COVID19 Not Detected     Human Metapneumovirus Not Detected     Human Rhinovirus/Enterovirus Not Detected     Influenza A PCR Not Detected     Influenza B PCR Not Detected     Parainfluenza Virus 1 Not Detected     Parainfluenza Virus 2 Not Detected     Parainfluenza Virus 3 Not Detected     Parainfluenza Virus 4 Not Detected     RSV, PCR Not Detected     Bordetella pertussis pcr Not Detected     Bordetella parapertussis PCR Not Detected     Chlamydophila pneumoniae PCR Not Detected     Mycoplasma pneumo by PCR Not Detected    Narrative:      In the setting of a positive respiratory panel with a viral infection PLUS a negative procalcitonin without other underlying concern for bacterial infection, consider observing off antibiotics or discontinuation of antibiotics and continue supportive care. If the respiratory panel is positive for atypical bacterial infection (Bordetella pertussis, Chlamydophila pneumoniae, or Mycoplasma pneumoniae), consider antibiotic de-escalation to target atypical bacterial infection.    Legionella Antigen, Urine - Urine, Urine, Clean Catch [[id number]]  (Normal) Collected: 08/20/22 0459    Lab Status: Final result Specimen: Urine, Clean Catch Updated: 08/20/22 1316     LEGIONELLA ANTIGEN, URINE Negative    S. Pneumo Ag Urine or CSF - Urine, Urine, Clean Catch [[id number]]  (Normal) Collected: 08/20/22 0459    Lab Status: Final result Specimen: Urine, Clean Catch Updated: 08/20/22 1316     Strep Pneumo Ag Negative    Blood Culture - Blood, Arm, Left [[id number]]  (Normal) Collected: 08/19/22 2119    Lab  Status: Preliminary result Specimen: Blood from Arm, Left Updated: 08/20/22 2134     Blood Culture No growth at 24 hours    Blood Culture - Blood, Arm, Right [[id number]]  (Normal) Collected: 08/19/22 2119    Lab Status: Preliminary result Specimen: Blood from Arm, Right Updated: 08/20/22 2134     Blood Culture No growth at 24 hours    COVID PRE-OP / PRE-PROCEDURE SCREENING ORDER (NO ISOLATION) - Swab, Nasopharynx [[id number]]  (Normal) Collected: 08/19/22 1952    Lab Status: Final result Specimen: Swab from Nasopharynx Updated: 08/19/22 2050    Narrative:      The following orders were created for panel order COVID PRE-OP / PRE-PROCEDURE SCREENING ORDER (NO ISOLATION) - Swab, Nasopharynx.  Procedure                               Abnormality         Status                     ---------                               -----------         ------                     COVID-19 and FLU A/B PCR...[[id number]]  Normal              Final result                 Please view results for these tests on the individual orders.    COVID-19 and FLU A/B PCR - Swab, Nasopharynx [[id number]]  (Normal) Collected: 08/19/22 1952    Lab Status: Final result Specimen: Swab from Nasopharynx Updated: 08/19/22 2050     COVID19 Not Detected     Influenza A PCR Not Detected     Influenza B PCR Not Detected    Narrative:      Fact sheet for providers: https://www.fda.gov/media/254533/download    Fact sheet for patients: https://www.fda.gov/media/225887/download    Test performed by PCR.    Urine Culture - Urine, Urine, Clean Catch [[id number]]  (Abnormal)  (Susceptibility) Collected: 08/19/22 1950    Lab Status: Final result Specimen: Urine, Clean Catch Updated: 08/21/22 1026     Urine Culture >100,000 CFU/mL Escherichia coli    Narrative:      Colonization of the urinary tract without infection is common. Treatment is discouraged unless the patient is symptomatic, pregnant, or undergoing an invasive urologic procedure.    Susceptibility       Escherichia coli      MAYANK      Ampicillin Intermediate      Ampicillin + Sulbactam Intermediate      Cefazolin Resistant     Cefepime Susceptible      Ceftazidime Susceptible      Ceftriaxone Susceptible      Gentamicin Susceptible      Levofloxacin Susceptible      Nitrofurantoin Susceptible      Piperacillin + Tazobactam Susceptible      Trimethoprim + Sulfamethoxazole Susceptible                                 CT Abdomen Pelvis Without Contrast    Result Date: 8/20/2022  EXAMINATION: CT SCAN OF THE CHEST, ABDOMEN AND PELVIS WITHOUT INTRAVENOUS CONTRAST DATE OF EXAM: 8/20/2022 1:15 AM HISTORY: Altered mental status, dyspnea, status post fall, bilateral anterior/lateral rib pain, generalized abdominal pain COMPARISON: None. TECHNIQUE: CT examination of the chest, abdomen and pelvis was performed without intravenous contrast. CT dose lowering techniques were used, to include: automated exposure control, adjustment for patient size, and/or use of iterative reconstruction. Note: The exam is limited because some types of pathology may not be adequately demonstrated due to lack of contrast enhancement. FINDINGS: CHEST: Lungs:   Moderate peripheral tree-in-bud nodularity Alyse in the left upper lobe and left lower lobe but also seen in the right middle lobe. Pleura: Trace left pleural effusion. No pneumothorax. Mediastinum And Danette: Subcentimeter lymph nodes Cardiovascular: Extensive coronary artery atherosclerotic calcifications. No thoracic aortic aneurysm. Chest Wall:  Normal. ABDOMEN/PELVIS: Liver: There is liver surface nodularity and thick overlying. Gallbladder/Billary: Gallstones Pancreas: Trace fat stranding about tail of pancreas. Spleen: Normal. Adrenal Glands: Normal. Kidneys: No hydronephrosis or radiopaque urinary tract calculus. GI Tract: No bowel dilation or wall thickening. Diffuse colonic diverticulosis. Mesentery/Peritoneum: Normal. Vasculature: Moderate atherosclerotic calcifications. No abdominal  aortic aneurysm. Lymph Nodes: Normal. Abdominal Wall: Minor subcutaneous fat stranding bilateral anterior abdominal wall. Bladder: Normal. Reproductive: Normal. MUSCULOSKELETAL: Degenerative changes in the spine. No acute osseous abnormality     1.  Trace fat stranding about tail of pancreas, nonspecific. Recommend correlation with serum lipase to exclude acute pancreatitis. 2.  Minor subcutaneous fat stranding bilateral anterior abdominal wall. Findings may represent acute subacute contusions related to fall versus from prior injections. 3.  Moderate peripheral tree-in-bud nodularity left lung greater than right differential considerations include chronic aspiration, infectious bronchiolitis, inflammatory bronchiolitis. 4.  Trace left pleural effusion. 5.  No other acute abnormality within limitations of unenhanced exam. 6.  Minor chronic liver disease. 7.  Other chronic findings as above. Electronically signed by:  Nader Barnes M.D.  8/19/2022 11:54 PM Mountain Time    XR Ribs Bilateral 3 View    Result Date: 8/19/2022  DATE OF EXAM: 8/19/2022 4:45 PM  PROCEDURE: XR RIBS BILATERAL 3 VW-  INDICATIONS: FALL  COMPARISON: No comparisons available.  TECHNIQUE: Three radiologic views of the bilateral ribs were obtained.  FINDINGS: AP chest radiograph shows the heart and vasculature to appear enlarged. There are mild diffuse pulmonary interstitial changes which may be chronic, and there appears to be mild left basilar pleural effusion or pleural thickening. Dedicated images show no right or left rib fracture or significant deformity. There is limited evaluation of the lower ribs due to superimposed dense soft tissues of the upper abdomen. Upper abdominal atherosclerotic calcifications are seen, at least of the splenic artery. Several calcifications in the right mid abdomen may represent gallstones.       1. No visible rib fracture. 2. Mild chronic appearing interstitial lung changes and focal left basilar atelectasis  or pneumonia. 3. Right upper quadrant calcifications, possibly gallstones.  4. Mild cardiomegaly and pulmonary venous hypertension.  This report was finalized on 8/19/2022 5:55 PM by Dr. Abkar Yung MD.      CT Head Without Contrast    Result Date: 8/19/2022  DATE OF EXAM: 8/19/2022 6:49 PM  PROCEDURE: CT HEAD WO CONTRAST-  INDICATIONS: AMS  COMPARISON: No comparisons available.  TECHNIQUE: Routine transaxial and coronal reconstruction images were obtained through the head without the administration of contrast. Automated exposure control and iterative reconstruction methods were used.  The radiation dose reduction device was turned on for each scan per the ALARA (As Low as Reasonably Achievable) protocol.  FINDINGS: The calvarium appears intact. Included paranasal sinuses and mastoids appear clear. There is expected degree of generalized cerebral atrophy for the patient's age. There is no evidence of intracranial hemorrhage, contusion, edema, mass or mass effect, acute infarction, hydrocephalus, or abnormal extra-axial collection. There is moderately extensive, symmetric bilateral white matter disease, greater in the frontal lobes. Incidental note is made of apparently nonmetallic left scleral band. Orbits otherwise appear unremarkable.      Chronic appearing changes of the aging brain. No evidence of acute intracranial disease is seen.  This report was finalized on 8/19/2022 8:23 PM by Dr. Akbar Yung MD.      CT Chest Without Contrast Diagnostic    Result Date: 8/20/2022  EXAMINATION: CT SCAN OF THE CHEST, ABDOMEN AND PELVIS WITHOUT INTRAVENOUS CONTRAST DATE OF EXAM: 8/20/2022 1:15 AM HISTORY: Altered mental status, dyspnea, status post fall, bilateral anterior/lateral rib pain, generalized abdominal pain COMPARISON: None. TECHNIQUE: CT examination of the chest, abdomen and pelvis was performed without intravenous contrast. CT dose lowering techniques were used, to include: automated exposure control, adjustment  for patient size, and/or use of iterative reconstruction. Note: The exam is limited because some types of pathology may not be adequately demonstrated due to lack of contrast enhancement. FINDINGS: CHEST: Lungs:   Moderate peripheral tree-in-bud nodularity Alyse in the left upper lobe and left lower lobe but also seen in the right middle lobe. Pleura: Trace left pleural effusion. No pneumothorax. Mediastinum And Danette: Subcentimeter lymph nodes Cardiovascular: Extensive coronary artery atherosclerotic calcifications. No thoracic aortic aneurysm. Chest Wall:  Normal. ABDOMEN/PELVIS: Liver: There is liver surface nodularity and thick overlying. Gallbladder/Billary: Gallstones Pancreas: Trace fat stranding about tail of pancreas. Spleen: Normal. Adrenal Glands: Normal. Kidneys: No hydronephrosis or radiopaque urinary tract calculus. GI Tract: No bowel dilation or wall thickening. Diffuse colonic diverticulosis. Mesentery/Peritoneum: Normal. Vasculature: Moderate atherosclerotic calcifications. No abdominal aortic aneurysm. Lymph Nodes: Normal. Abdominal Wall: Minor subcutaneous fat stranding bilateral anterior abdominal wall. Bladder: Normal. Reproductive: Normal. MUSCULOSKELETAL: Degenerative changes in the spine. No acute osseous abnormality     1.  Trace fat stranding about tail of pancreas, nonspecific. Recommend correlation with serum lipase to exclude acute pancreatitis. 2.  Minor subcutaneous fat stranding bilateral anterior abdominal wall. Findings may represent acute subacute contusions related to fall versus from prior injections. 3.  Moderate peripheral tree-in-bud nodularity left lung greater than right differential considerations include chronic aspiration, infectious bronchiolitis, inflammatory bronchiolitis. 4.  Trace left pleural effusion. 5.  No other acute abnormality within limitations of unenhanced exam. 6.  Minor chronic liver disease. 7.  Other chronic findings as above. Electronically signed by:   Nader Barnes M.D.  8/19/2022 11:58 PM Mountain Time              Results for orders placed during the hospital encounter of 04/16/18    Adult Transthoracic Echo Complete W/ Cont if Necessary Per Protocol    Interpretation Summary  · Left ventricular wall thickness is consistent with mild concentric hypertrophy.  · Left ventricular diastolic dysfunction (grade I a) consistent with impaired relaxation.  · There is mild calcification of the aortic valve.  · Mild mitral valve regurgitation is present  · Mild tricuspid valve regurgitation is present.  · Calculated right ventricular systolic pressure from tricuspid regurgitation is 79 mmHg.  · There is a small (<1cm) pericardial effusion.  · Calculated EF = 56%. Normal left ventricular cavity size noted. All left ventricular wall segments contract normally. asynchronous septal motion with mild hypokinesis of inferior wall Left ventricular wall thickness is consistent with mild concentric hypertrophy. Left ventricular diastolic dysfunction is noted (grade I a w/high LAP) consistent with impaired relaxation.      Plan for Follow-up of Pending Labs/Results: I will f/u blood culture results (but have urine cx results with susceptibilities so patient is covered w abx)    Pending Labs     Order Current Status    Blood Culture - Blood, Arm, Left Preliminary result    Blood Culture - Blood, Arm, Right Preliminary result        Discharge Details        Discharge Medications      New Medications      Instructions Start Date   cefdinir 300 MG capsule  Commonly known as: OMNICEF   300 mg, Oral, 2 Times Daily      PHARMACY MEDS TO BED CONSULT   Does not apply, Daily         Changes to Medications      Instructions Start Date   cholestyramine 4 g packet  Commonly known as: QUESTRAN  What changed: how much to take   1 packet, Oral, 2 Times Daily With Meals         Continue These Medications      Instructions Start Date   acetaminophen 325 MG tablet  Commonly known as: TYLENOL   650  mg, Oral, Every 6 Hours PRN      aspirin 81 MG EC tablet   81 mg, Oral, Daily      cetirizine 10 MG tablet  Commonly known as: zyrTEC   10 mg, Oral, Daily      dilTIAZem  MG 24 hr capsule  Commonly known as: CARDIZEM CD   240 mg, Oral, Daily      fish oil 1000 MG capsule capsule   1,200 mg, Oral, 2 Times Daily With Meals      Flaxseed Oil 1000 MG capsule   2,000 mg, Oral, 2 Times Daily      furosemide 20 MG tablet  Commonly known as: LASIX   20 mg, Oral, Daily      glipizide 10 MG tablet  Commonly known as: GLUCOTROL   20 mg, Oral, 2 Times Daily Before Meals      hydrALAZINE 10 MG tablet  Commonly known as: APRESOLINE   25 mg, Oral, 2 Times Daily      iron polysaccharides 150 MG capsule  Commonly known as: NIFEREX   150 mg, Daily      levothyroxine 100 MCG tablet  Commonly known as: SYNTHROID, LEVOTHROID   100 mcg, Oral, Daily      Magnesium 500 MG capsule   1 tablet, Oral, Daily      metoprolol tartrate 100 MG tablet  Commonly known as: LOPRESSOR   100 mg, Oral, 2 Times Daily      multivitamin with minerals tablet tablet   1 tablet, Oral, Daily      mupirocin 2 % cream  Commonly known as: BACTROBAN   1 application, Topical, 3 Times Daily PRN      nitroglycerin 0.4 MG SL tablet  Commonly known as: NITROSTAT   No dose, route, or frequency recorded.      O2  Commonly known as: OXYGEN   2 L/min, Inhalation, Nightly, Home therapy      pantoprazole 40 MG EC tablet  Commonly known as: PROTONIX   40 mg, Oral, Daily, Taking at bedtime      simvastatin 10 MG tablet  Commonly known as: ZOCOR   10 mg, Oral, Nightly      triamcinolone 0.1 % ointment  Commonly known as: KENALOG   1 application, Topical, Daily PRN      TUMS PO   1 tablet, Oral, 2 Times Daily PRN      vitamin B-12 1000 MCG tablet  Commonly known as: CYANOCOBALAMIN   1,000 mcg, Oral, 4 Times Weekly      vitamin C 500 MG tablet  Commonly known as: ASCORBIC ACID   1,000 mg, Oral, Daily      vitamin D3 125 MCG (5000 UT) capsule capsule   1,000 Units, Oral,  Daily         Stop These Medications    LEVEMIR FLEXTOUCH SC            No Known Allergies      Discharge Disposition:  Home or Self Care    Diet:  Hospital:  Diet Order   Procedures   • Diet Regular; Cardiac, Consistent Carbohydrate       Activity: as able      Restrictions or Other Recommendations:  none       CODE STATUS:    Code Status and Medical Interventions:   Ordered at: 08/19/22 2230     Medical Intervention Limits:    NO intubation (DNI)     Level Of Support Discussed With:    Next of Kin (If No Surrogate)     Code Status (Patient has no pulse and is not breathing):    No CPR (Do Not Attempt to Resuscitate)     Medical Interventions (Patient has pulse or is breathing):    Limited Support       Future Appointments   Date Time Provider Department Center   12/19/2022 11:30 AM Teo Peoples MD [facility]       Additional Instructions for the Follow-ups that You Need to Schedule     Discharge Follow-up with PCP   As directed       Currently Documented PCP:    Lizette Fitzpatrick PA    PCP Phone Number:    [phone]     Follow Up Details: within 1 week                     Aziza Garcia MD  08/21/22      Time Spent on Discharge:  I spent  35  minutes on this discharge activity which included: face-to-face encounter with the patient, counseling of daughter, discussion with microbiology lab for results, reviewing the data in the system, coordination of the care with the nursing staff, documentation, and entering orders.

## 2022-08-22 NOTE — OUTREACH NOTE
Prep Survey    Flowsheet Row Responses   [facility] facility patient discharged from? [facility]   Is LACE score < 7 ? No   Emergency Room discharge w/ pulse ox? No   Eligibility Readm Mgmt   Discharge diagnosis Recurrent UTI   Does the patient have one of the following disease processes/diagnoses(primary or secondary)? Other   Does the patient have Home health ordered? No   Is there a DME ordered? No   Prep survey completed? Yes          NIA ZARAGOZA - Registered Nurse

## 2022-08-24 ENCOUNTER — READMISSION MANAGEMENT (OUTPATIENT)
Dept: CALL CENTER | Facility: HOSPITAL | Age: 87
End: 2022-08-24

## 2022-08-24 LAB
BACTERIA SPEC AEROBE CULT: NORMAL
BACTERIA SPEC AEROBE CULT: NORMAL

## 2022-08-24 NOTE — OUTREACH NOTE
Medical Week 1 Survey    Flowsheet Row Responses   [facility] ([facility] patient discharged from? [place]   Does the patient have one of the following disease processes/diagnoses(primary or secondary)? Other   Week 1 attempt successful? Yes   Call start time 0943   Call end time 0944   Discharge diagnosis Recurrent UTI   Is patient permission given to speak with other caregiver? Yes   List who call center can speak with daughter- Ira   Person spoke with today (if not patient) and relationship daughter   Meds reviewed with patient/caregiver? Yes   Is the patient having any side effects they believe may be caused by any medication additions or changes? No   Does the patient have all medications ordered at discharge? Yes   Is the patient taking all medications as directed (includes completed medication regime)? Yes   Does the patient have a primary care provider?  Yes   Does the patient have an appointment with their PCP within 7 days of discharge? Yes   Comments regarding PCP has a f/u appt with PCP tomorrow (8/25)   Has the patient kept scheduled appointments due by today? N/A   Has home health visited the patient within 72 hours of discharge? N/A   Psychosocial issues? No   Did the patient receive a copy of their discharge instructions? Yes   What is the patient's perception of their health status since discharge? Improving   Is the patient/caregiver able to teach back the hierarchy of who to call/visit for symptoms/problems? PCP, Specialist, Home health nurse, Urgent Care, ED, 911 Yes   Week 1 call completed? Yes   Wrap up additional comments Per daughter, patient is doing well, no questions.          MARÍA CHAN - Registered Nurse

## 2022-08-30 ENCOUNTER — OFFICE VISIT (OUTPATIENT)
Dept: CARDIOLOGY | Facility: HOSPITAL | Age: 87
End: 2022-08-30

## 2022-08-30 VITALS
SYSTOLIC BLOOD PRESSURE: 156 MMHG | WEIGHT: 125.6 LBS | HEIGHT: 60 IN | OXYGEN SATURATION: 94 % | HEART RATE: 47 BPM | BODY MASS INDEX: 24.66 KG/M2 | RESPIRATION RATE: 16 BRPM | TEMPERATURE: 96.9 F | DIASTOLIC BLOOD PRESSURE: 67 MMHG

## 2022-08-30 DIAGNOSIS — I25.10 CORONARY ARTERY DISEASE INVOLVING NATIVE CORONARY ARTERY OF NATIVE HEART WITHOUT ANGINA PECTORIS: Primary | ICD-10-CM

## 2022-08-30 DIAGNOSIS — I10 PRIMARY HYPERTENSION: ICD-10-CM

## 2022-08-30 DIAGNOSIS — I48.0 PAROXYSMAL ATRIAL FIBRILLATION: ICD-10-CM

## 2022-08-30 DIAGNOSIS — E78.2 MIXED HYPERLIPIDEMIA: ICD-10-CM

## 2022-08-30 PROCEDURE — 99214 OFFICE O/P EST MOD 30 MIN: CPT | Performed by: NURSE PRACTITIONER

## 2022-08-30 RX ORDER — DICYCLOMINE HYDROCHLORIDE 10 MG/1
10 CAPSULE ORAL NIGHTLY
COMMUNITY
Start: 2022-08-18

## 2022-09-12 ENCOUNTER — HOSPITAL ENCOUNTER (OUTPATIENT)
Dept: GENERAL RADIOLOGY | Facility: HOSPITAL | Age: 87
Discharge: HOME | End: 2022-09-12
Payer: MEDICARE

## 2022-09-12 DIAGNOSIS — M81.0 POSTMENOPAUSAL OSTEOPOROSIS: ICD-10-CM

## 2022-09-12 PROCEDURE — 77080 DXA BONE DENSITY AXIAL: CPT

## 2022-12-19 ENCOUNTER — OFFICE VISIT (OUTPATIENT)
Dept: CARDIOLOGY | Facility: CLINIC | Age: 87
End: 2022-12-19

## 2022-12-19 VITALS
HEIGHT: 60 IN | BODY MASS INDEX: 25.32 KG/M2 | WEIGHT: 129 LBS | HEART RATE: 52 BPM | SYSTOLIC BLOOD PRESSURE: 126 MMHG | OXYGEN SATURATION: 95 % | DIASTOLIC BLOOD PRESSURE: 74 MMHG

## 2022-12-19 DIAGNOSIS — E78.2 MIXED HYPERLIPIDEMIA: ICD-10-CM

## 2022-12-19 DIAGNOSIS — R30.0 DYSURIA: Primary | ICD-10-CM

## 2022-12-19 DIAGNOSIS — I10 PRIMARY HYPERTENSION: ICD-10-CM

## 2022-12-19 DIAGNOSIS — I25.10 CORONARY ARTERY DISEASE INVOLVING NATIVE CORONARY ARTERY OF NATIVE HEART WITHOUT ANGINA PECTORIS: ICD-10-CM

## 2022-12-19 PROCEDURE — 99214 OFFICE O/P EST MOD 30 MIN: CPT | Performed by: INTERNAL MEDICINE

## 2022-12-19 RX ORDER — LEVOCETIRIZINE DIHYDROCHLORIDE 5 MG/1
TABLET, FILM COATED ORAL DAILY
COMMUNITY
Start: 2022-12-14

## 2022-12-19 NOTE — PROGRESS NOTES
[facility] Cardiology  Office Progress Note  Ninoska Gloria  2/8/1931  [address] [address]       Visit Date: 12/19/22    PCP: Lizette Fitzpatrick, PA  [address] [address]    IDENTIFICATION: A 89 y.o. female from [place], [place]    PROBLEM LIST:   1. CAD  1. Inferior MI 1996 s/p tpa  2. stent to RCA and LAD 1996 Dr. Velasquez  3. stress Echo 2006 - WNL  4. Echo 8/2012 - normal LV function, no significant valvular abnormalities  5. 8/16/13 MPS: Abnormal large sized severely fixed inferior defect, moderate size moderately fixed lateral defect with no reversibility, EF 45%  2. Pulmonary hypertension  1. 4/17/18 echo: EF 56%, mild concentric LVH, grade 1 diastolic dysfunction, mild calcification of the aortic valve, mild MR, mild TR, RVSP 79 mmHg, small pericardial effusion  2. CT chest - 2020 bronchiectasis  3. A-fib/flutter  1. [id number] 6 no AC due to falls  4. Hypertension  5. Dyslipidemia  6. DM 2, on insulin  1. 4/19/18 A1c 8.7  7. CKD  1. 4/18 Cr 1.2  8. Hypothyroidism  9. GERD  10. Osteoarthritis  11. History of retinal detachment  12. Frequent falls  13. Diverticulosis  14. ACUNA  15. Cholelithiasis  16. Surgical history  1. 5 c section       CC:   Chief Complaint   Patient presents with   • Coronary artery disease involving native coronary artery of       Allergies  No Known Allergies    Current Medications    Current Outpatient Medications:   •  acetaminophen (TYLENOL) 325 MG tablet, Take 650 mg by mouth Every 6 (Six) Hours As Needed for Mild Pain ., Disp: , Rfl:   •  aspirin 81 MG EC tablet, Take 81 mg by mouth Daily., Disp: , Rfl:   •  Calcium Carbonate Antacid (TUMS PO), Take 1 tablet by mouth 2 (Two) Times a Day As Needed (GERD)., Disp: , Rfl:   •  Cholecalciferol (VITAMIN D3) 5000 units capsule capsule, Take 1,000 Units by mouth Daily., Disp: , Rfl:   •  dicyclomine (BENTYL) 10 MG capsule, Take 10 mg by mouth Every Night., Disp: , Rfl:   •  dilTIAZem CD  "(CARDIZEM CD) 240 MG 24 hr capsule, Take 240 mg by mouth Daily., Disp: , Rfl:   •  furosemide (LASIX) 20 MG tablet, Take 20 mg by mouth Daily. PT takes 2 tablets on Wed only, Disp: , Rfl:   •  glipiZIDE (GLUCOTROL) 10 MG tablet, Take 20 mg by mouth 2 (Two) Times a Day Before Meals., Disp: , Rfl:   •  hydrALAZINE (APRESOLINE) 10 MG tablet, Take 10 mg by mouth 2 (Two) Times a Day., Disp: , Rfl:   •  insulin detemir (LEVEMIR) 100 UNIT/ML injection, Inject 70 Units under the skin into the appropriate area as directed Daily., Disp: , Rfl:   •  levothyroxine (SYNTHROID, LEVOTHROID) 100 MCG tablet, Take 100 mcg by mouth Daily., Disp: , Rfl:   •  Magnesium 500 MG capsule, Take 1 tablet by mouth As Needed., Disp: , Rfl:   •  metoprolol tartrate (LOPRESSOR) 50 MG tablet, Take 50 mg by mouth 2 (Two) Times a Day., Disp: , Rfl:   •  nitroglycerin (NITROSTAT) 0.4 MG SL tablet, , Disp: , Rfl:   •  pantoprazole (PROTONIX) 40 MG EC tablet, Take 40 mg by mouth Daily. Taking at bedtime, Disp: , Rfl:   •  simvastatin (ZOCOR) 10 MG tablet, Take 10 mg by mouth Every Night., Disp: , Rfl:   •  triamcinolone (KENALOG) 0.1 % ointment, Apply 1 application topically to the appropriate area as directed Daily As Needed for Irritation or Rash., Disp: , Rfl:   •  vitamin B-12 (CYANOCOBALAMIN) 1000 MCG tablet, Take 1,000 mcg by mouth 4 (Four) Times a Week., Disp: , Rfl:   •  vitamin C (ASCORBIC ACID) 500 MG tablet, Take 1,000 mg by mouth Daily., Disp: , Rfl:   •  levocetirizine (XYZAL) 5 MG tablet, Daily., Disp: , Rfl:       History of Present Illness   Ninoska Gloria is a 89 y.o. year old female here for follow up.  No cardiac issues but feels like she is having another urinary tract infection at current        OBJECTIVE:  Vitals:    12/19/22 1122   BP: 126/74   BP Location: Left arm   Patient Position: Sitting   Pulse: 52   SpO2: 95%   Weight: 58.5 kg (129 lb)   Height: 152.4 cm (60\")     Body mass index is 25.19 kg/m².    Constitutional:       " Appearance: Healthy appearance. Not in distress.   Neck:      Vascular: No JVR. JVD normal.   Pulmonary:      Effort: Pulmonary effort is normal.      Breath sounds: Normal breath sounds. No wheezing. No rhonchi. No rales.   Chest:      Chest wall: Not tender to palpatation.   Cardiovascular:      PMI at left midclavicular line. Normal rate. Regular rhythm. Normal S1. Normal S2.      Murmurs: There is a systolic murmur.      No gallop. No click. No rub.   Pulses:     Intact distal pulses.   Edema:     Peripheral edema absent.   Abdominal:      General: Bowel sounds are normal.      Palpations: Abdomen is soft.      Tenderness: There is no abdominal tenderness.   Musculoskeletal: Normal range of motion.         General: No tenderness. Skin:     General: Skin is warm and dry.   Neurological:      General: No focal deficit present.      Mental Status: Alert and oriented to person, place and time.         Diagnostic Data:  Procedures      ASSESSMENT:   Diagnosis Plan   1. Dysuria  Urinalysis With Culture If Indicated - Urine, Clean Catch      2. Coronary artery disease involving native coronary artery of native heart without angina pectoris        3. Primary hypertension        4. Mixed hyperlipidemia            PLAN:  CAD post remote PCI and stenting no angina continue medical manage    Hypertension both essential and pulmonary continued medical      Dyslipidemia controlled on statin therapy      UA to be obtained today if actionable or refer to her primary care provider      Teo Peoples MD, FACC

## 2022-12-24 LAB
APPEARANCE UR: CLEAR
BACTERIA #/AREA URNS HPF: ABNORMAL /HPF
BACTERIA UR CULT: ABNORMAL
BACTERIA UR CULT: ABNORMAL
BILIRUB UR QL STRIP: NEGATIVE
CASTS URNS QL MICRO: ABNORMAL /LPF
COLOR UR: YELLOW
EPI CELLS #/AREA URNS HPF: ABNORMAL /HPF (ref 0–10)
GLUCOSE UR QL STRIP: NEGATIVE
HGB UR QL STRIP: NEGATIVE
KETONES UR QL STRIP: NEGATIVE
LEUKOCYTE ESTERASE UR QL STRIP: ABNORMAL
MICRO URNS: ABNORMAL
NITRITE UR QL STRIP: POSITIVE
OTHER ANTIBIOTIC SUSC ISLT: ABNORMAL
PH UR STRIP: 5.5 [PH] (ref 5–7.5)
PROT UR QL STRIP: ABNORMAL
RBC #/AREA URNS HPF: ABNORMAL /HPF (ref 0–2)
SP GR UR STRIP: 1.01 (ref 1–1.03)
URINALYSIS REFLEX: ABNORMAL
UROBILINOGEN UR STRIP-MCNC: 0.2 MG/DL (ref 0.2–1)
WBC #/AREA URNS HPF: >30 /HPF (ref 0–5)

## 2022-12-27 ENCOUNTER — TELEPHONE (OUTPATIENT)
Dept: CARDIOLOGY | Facility: CLINIC | Age: 87
End: 2022-12-27

## 2022-12-27 NOTE — TELEPHONE ENCOUNTER
Spoke with pt's daughter regarding recent UA and the need to f/u with pcp asap- records faxed to pcp office and talked with [place] to let her know we would be sending over the result

## 2023-10-17 ENCOUNTER — TELEPHONE (OUTPATIENT)
Dept: CARDIOLOGY | Facility: CLINIC | Age: 88
End: 2023-10-17
Payer: MEDICARE

## 2023-10-17 NOTE — TELEPHONE ENCOUNTER
Patient PCP calling to let us know patient was experiencing chest pain. No other symptoms, vital signs or details left on message. The call back number that was left cut out at the end and caller ID was not available because it was transferred to my phone.     Tried calling mobile and home phone number on patients chart to speak with patient about this with no answer.     Will look into getting PCP contact from online and also try patient phones again.     Spoke with patient daughter and states patient does have chest pain and has taken on nitro since its been happening. She isnt currently having chest pain, it just comes and goes. Her blood pressure has been reading 140s-150s which she says is normal for her and heart rate 60-70s.     They want a sooner appointment to be able to speak with Dr. Peoples and see what his recommendations are.   I reached out to scheduling for opening we have.

## 2023-10-18 ENCOUNTER — OFFICE VISIT (OUTPATIENT)
Dept: CARDIOLOGY | Facility: CLINIC | Age: 88
End: 2023-10-18
Payer: MEDICARE

## 2023-10-18 VITALS
WEIGHT: 131 LBS | HEART RATE: 59 BPM | OXYGEN SATURATION: 93 % | SYSTOLIC BLOOD PRESSURE: 144 MMHG | DIASTOLIC BLOOD PRESSURE: 56 MMHG | BODY MASS INDEX: 27.5 KG/M2 | HEIGHT: 58 IN

## 2023-10-18 DIAGNOSIS — E11.9 TYPE 2 DIABETES MELLITUS WITHOUT COMPLICATION, WITHOUT LONG-TERM CURRENT USE OF INSULIN: ICD-10-CM

## 2023-10-18 DIAGNOSIS — E78.2 MIXED HYPERLIPIDEMIA: ICD-10-CM

## 2023-10-18 DIAGNOSIS — I48.0 PAROXYSMAL ATRIAL FIBRILLATION: ICD-10-CM

## 2023-10-18 DIAGNOSIS — I20.89 OTHER FORMS OF ANGINA PECTORIS: Primary | ICD-10-CM

## 2023-10-18 DIAGNOSIS — I10 PRIMARY HYPERTENSION: ICD-10-CM

## 2023-10-18 RX ORDER — LEVOTHYROXINE SODIUM 88 UG/1
88 TABLET ORAL DAILY
COMMUNITY

## 2023-10-18 RX ORDER — ACETAMINOPHEN 500 MG
1000 TABLET ORAL 2 TIMES DAILY PRN
COMMUNITY

## 2023-10-18 RX ORDER — ECHINACEA 400 MG
2 CAPSULE ORAL 2 TIMES DAILY
COMMUNITY

## 2023-10-18 RX ORDER — MULTIPLE VITAMINS W/ MINERALS TAB 9MG-400MCG
1 TAB ORAL DAILY
COMMUNITY

## 2023-10-18 RX ORDER — AMOXICILLIN 500 MG
2400 CAPSULE ORAL DAILY
COMMUNITY

## 2023-10-18 RX ORDER — FUROSEMIDE 20 MG/1
TABLET ORAL
COMMUNITY
Start: 2023-05-09

## 2023-10-18 RX ORDER — HYDRALAZINE HYDROCHLORIDE 50 MG/1
50 TABLET, FILM COATED ORAL 2 TIMES DAILY
COMMUNITY
Start: 2023-10-11

## 2023-10-18 RX ORDER — GLUCOSAMINE HCL 500 MG
1 TABLET ORAL NIGHTLY
COMMUNITY

## 2023-10-18 RX ORDER — ISOSORBIDE MONONITRATE 30 MG/1
30 TABLET, EXTENDED RELEASE ORAL DAILY
Qty: 30 TABLET | Refills: 11 | Status: SHIPPED | OUTPATIENT
Start: 2023-10-18

## 2023-10-18 NOTE — PROGRESS NOTES
[facility] Cardiology  Office Progress Note  Ninoska Gloria  2/8/1931  [address] [facility] [address]       Visit Date: 10/18/23    PCP: Lizette Fitzpatrick, PA  [address] [address]    IDENTIFICATION: A 89 y.o. female from [place], [place]    PROBLEM LIST:   CAD  Inferior MI 1996 s/p tpa  stent to RCA and LAD 1996 Dr. Velasquez  stress Echo 2006 - WNL  Echo 8/2012 - normal LV function, no significant valvular abnormalities  8/16/13 MPS: Abnormal large sized severely fixed inferior defect, moderate size moderately fixed lateral defect with no reversibility, EF 45%  Pulmonary hypertension  4/17/18 echo: EF 56%, mild concentric LVH, grade 1 diastolic dysfunction, mild calcification of the aortic valve, mild MR, mild TR, RVSP 79 mmHg, small pericardial effusion  CT chest - 2020 bronchiectasis  A-fib/flutter  [id number] 6 no AC due to falls  Hypertension  Dyslipidemia  DM 2, on insulin  4/19/18 A1c 8.7  CKD  4/18 Cr 1.2  Hypothyroidism  GERD  Osteoarthritis  History of retinal detachment  Frequent falls  Diverticulosis  ACUNA  Cholelithiasis  Surgical history  5 c section          CC:   Chief Complaint   Patient presents with    Chest Pain    Shortness of Breath    Atrial Fibrillation    Hypertension       Allergies  No Known Allergies    Current Medications  Current Outpatient Medications   Medication Instructions    acetaminophen (TYLENOL) 1,000 mg, Oral, 2 Times Daily PRN    aspirin 81 mg, Oral, Daily    Biotin w/ Vitamins C & E (HAIR SKIN & NAILS GUMMIES PO) 1 tablet, Oral, 2 Times Daily    Calcium Carbonate Antacid (TUMS PO) 1 tablet, Oral, 2 Times Daily PRN    Cranberry 400 MG tablet 1 tablet, Oral, Nightly    dilTIAZem CD (CARDIZEM CD) 240 mg, Oral, Daily    fish oil 2,400 mg, Oral, Daily    Flaxseed, Linseed, (Flaxseed Oil) 1000 MG capsule 2 capsules, Oral, 2 Times Daily    furosemide (LASIX) 20 MG tablet Oral, 40 mg on Sun Tues Thurs and Sat<BR>20 mg Mon Wed Fri     "glipizide (GLUCOTROL) 20 mg, Oral, 2 Times Daily Before Meals    hydrALAZINE (APRESOLINE) 50 mg, Oral, 2 Times Daily    insulin detemir (LEVEMIR) 70 Units, Subcutaneous, Daily    isosorbide mononitrate (IMDUR) 30 mg, Oral, Daily    levothyroxine (SYNTHROID, LEVOTHROID) 88 mcg, Oral, Daily    Magnesium 500 MG capsule 1 tablet, Oral, As Needed    metoprolol tartrate (LOPRESSOR) 25 mg, Oral, 2 Times Daily    multivitamin with minerals (MULTIVITAMIN ADULTS 50+ PO) 1 tablet, Oral, Daily    nitroglycerin (NITROSTAT) 0.4 MG SL tablet No dose, route, or frequency recorded.    simvastatin (ZOCOR) 10 mg, Oral, Nightly    triamcinolone (KENALOG) 0.1 % ointment 1 application , Topical, Daily PRN    vitamin B-12 (CYANOCOBALAMIN) 1,000 mcg, Oral, 4 Times Weekly    Vitamin D3 2,000 Units, Oral, Daily        History of Present Illness   Ninoska Gloria is a 89 y.o. year old female here for follow up.    Noted episode of substernal chest discomfort resolved with 1 nitroglycerin.  She states there was no aggravating factor that she can state.  Her blood sugar has been a little elevated most recently.    OBJECTIVE:  Vitals:    10/18/23 1021   BP: 144/56   BP Location: Right arm   Patient Position: Sitting   Pulse: 59   SpO2: 93%   Weight: 59.4 kg (131 lb)   Height: 147.3 cm (58\")     Body mass index is 27.38 kg/m².    Constitutional:       Appearance: Healthy appearance. Not in distress.   Neck:      Vascular: No JVR. JVD normal.   Pulmonary:      Effort: Pulmonary effort is normal.      Breath sounds: Normal breath sounds. No wheezing. No rhonchi. No rales.   Chest:      Chest wall: Not tender to palpatation.   Cardiovascular:      PMI at left midclavicular line. Normal rate. Regular rhythm. Normal S1. Normal S2.       Murmurs: There is a systolic murmur.      No gallop.  No click. No rub.   Pulses:     Intact distal pulses.   Edema:     Peripheral edema absent.   Abdominal:      General: Bowel sounds are normal.      Palpations: " Abdomen is soft.      Tenderness: There is no abdominal tenderness.   Musculoskeletal: Normal range of motion.         General: No tenderness. Skin:     General: Skin is warm and dry.   Neurological:      General: No focal deficit present.      Mental Status: Alert and oriented to person, place and time.         Diagnostic Data:  Procedures        ASSESSMENT:   Diagnosis Plan   1. Other forms of angina pectoris        2. Primary hypertension        3. Mixed hyperlipidemia        4. Paroxysmal atrial fibrillation        5. Type 2 diabetes mellitus without complication, without long-term current use of insulin            PLAN:  Angina we will advance antianginal medications with Imdur 30.    Hypertension controlled currently on hydralazine Toprol diltiazem    Mixed dyslipidemia controlled on statin therapy    Diabetes insulin requiring pending recent A1c          Teo Peoples MD, FACC

## 2023-11-08 ENCOUNTER — TRANSCRIBE ORDERS (OUTPATIENT)
Dept: ADMINISTRATIVE | Facility: HOSPITAL | Age: 88
End: 2023-11-08
Payer: MEDICARE

## 2023-11-08 DIAGNOSIS — R10.84 GENERALIZED ABDOMINAL PAIN: Primary | ICD-10-CM

## 2023-11-28 ENCOUNTER — HOSPITAL ENCOUNTER (OUTPATIENT)
Dept: CT IMAGING | Facility: HOSPITAL | Age: 88
Discharge: HOME | End: 2023-11-28
Admitting: PHYSICIAN ASSISTANT
Payer: MEDICARE

## 2023-11-28 DIAGNOSIS — R10.84 GENERALIZED ABDOMINAL PAIN: ICD-10-CM

## 2023-11-28 PROCEDURE — 74176 CT ABD & PELVIS W/O CONTRAST: CPT

## 2024-01-12 NOTE — PROGRESS NOTES
Clinical Nutrition     Nutrition Assessment  Reason for Visit:   Malnutrition Severity Assessment      Patient Name: Ninoska Gloria  YOB: 1931  MRN: [id number]  Date of Encounter: 08/21/22 14:10 EDT  Admission date: 8/19/2022      Comments:  Pt meets criteria for non severe chronic malnutrition based on intake hx w some muscle wasting. See flowsheet note.        Admission Diagnosis    UTI (urinary tract infection) [N39.0]  Altered mental status, unspecified altered mental status type [R41.82]     Hospital Problem List    Recurrent UTI    Coronary artery disease involving native coronary artery of native heart without angina pectoris    Essential hypertension    Dyslipidemia    Type 2 diabetes mellitus without complication, with long-term current use of insulin (HCC)    Paroxysmal atrial fibrillation (HCC)    Dementia (HCC)    Stage 3b chronic kidney disease (HCC)    Chronic diastolic CHF (congestive heart failure) (HCC)    GERD without esophagitis    UTI (urinary tract infection)      Applicable PMH/PSxH:     PMH: She  has a past medical history of Abnormal ECG, Anemia, Anxiety and depression, Arrhythmia, Atrial fibrillation (HCC), Back pain, Cancer (HCC), CHF (congestive heart failure) (HCC), Cirrhosis (HCC), CKD (chronic kidney disease), stage III (HCC), Constipation, COPD (chronic obstructive pulmonary disease) (HCC), Coronary artery disease, COVID-19 (09/2021), Diabetes mellitus (HCC) (1990's), Diarrhea, Disease of thyroid gland, Elevated cholesterol, Full dentures, GERD (gastroesophageal reflux disease), Headache, Heart murmur, High triglycerides, History of blood transfusion, History of bronchitis, History of pneumonia, [place] (hard of hearing), Hyperlipidemia, Hypertension (1948), Iron deficiency (1996), Myocardial infarction (HCC) (1996), Osteoarthritis, Ovarian cyst, Pneumonia, Retinal detachment (2009), Shingles, SOB (shortness of breath), and Vision problems.   PSxH: She  has  Alfredo Chong, was evaluated through a synchronous (real-time) audio-video encounter. The patient (or guardian if applicable) is aware that this is a billable service, which includes applicable co-pays. This Virtual Visit was conducted with patient's (and/or legal guardian's) consent. Patient identification was verified, and a caregiver was present when appropriate.   The patient was located at Home: [address]  [address] [facility] [address]  Provider was located at Facility (Appt Dept): [address] [place] [address]      Alfredo Chong (:  1949) is a Established patient, presenting virtually for evaluation of the following:    Assessment & Plan   Below is the assessment and plan developed based on review of pertinent history, physical exam, labs, studies, and medications.  1. Hypothyroidism, unspecified type  Lab Results   Component Value Date    TSH 1.90 2019    TSHREFLEX 0.69 2023     Controlled continue current meds  2. Essential hypertension  Controlled continue current meds  3. Mixed hyperlipidemia  atorvastatin - 20 MG  Controlled continue current meds  4. Coronary artery disease involving native coronary artery of native heart without angina pectoris  5. Hematuria, unspecified type  -     External Referral To Urology  6. Obesity, Class III, BMI 40-49.9 (morbid obesity) (HCC)  7. Systolic CHF, chronic (HCC)  Currently compensated.    No follow-ups on file.       Subjective   Alfredo Chong is a 74 y.o. male. No chief complaint on file.      2-3 days of gross hematuria. Patient saw urologist, Dr. Martinez but felt that he was not getting the care that he needed.  Patient does have a CT scan and has not seen results of this yet.  Patient would like to follow-up with another urologist.  Patient has had no significant continuation of the hematuria but would like to make sure that there is nothing else going on.  Patient has had no urinary complaints recently.  Has had  "a past surgical history that includes  section; Cataract extraction (Bilateral, 2008, ); Tubal ligation (); Cardiac catheterization (); Colonoscopy; Mouth surgery; Esophagogastroduodenoscopy (N/A, 2021); and Coronary stent placement ().         Diet/Nutrition Related History:     Family rpt < 50% intake 3-4 mo       Labs reviewed   Yes      Medications reviewed   Yes  Abx, Protonix, Probiotic, Multi vit min    Intake/Ouptut 24 hrs reviewed   Yes      Anthropometrics     Flowsheet Rows    Admission Height 152.4 cm (60\") Documented at 2022 1556   Admission Weight 61.2 kg (135 lb) Documented at 2022 1556       Height: 152.4 cm (60\")    Last filed wt: Weight: 59.6 kg (131 lb 4.8 oz) (22 0600)  Weight Method: Bed scale    BMI: BMI (Calculated): 25.6  Overweight: 25.0-29.9kg/m2     Ideal Body Weight (IBW) (kg): 45.86      Weight Change   UBW: 135 lbs per family - had lost to 128 lbs now regained  Weight change:  % wt change:   Time frame of weight loss:      Nutrition Focused Physical Exam  Date:     Pt meets criteria for non severe chronic malnutrition based on intake hx w some muscle wasting. See flowsheet note.    Current Nutrition Prescription     PO: Diet Regular; Cardiac, Consistent Carbohydrate  Orders Placed This Encounter      DIET MESSAGE Could you send soup on lunch and dinner trays please? Also pt prefers tea. Thank you!      Dietary Nutrition Supplements Magic Cup  daily    Intake: insuffic data        Nutrition Diagnosis       Problem Malnutrition  non severe chronic   Etiology dminished intake   Signs/Symptoms Intake hx w some muscle wasting.   Status:      Goal:   General: Nutrition to support treatment  PO: appropriate p.o  Additional goals:      Nutrition Intervention     Follow treatment progress, Care plan reviewed, assured has had menu/selections      Monitoring/Evaluation:   Per protocol, PO intake, Pertinent labs, Weight, Symptoms if adm " extended        Bobbi Medrano RD,   Time Spent: 30  min

## 2024-03-11 ENCOUNTER — OFFICE VISIT (OUTPATIENT)
Dept: CARDIOLOGY | Facility: CLINIC | Age: 89
End: 2024-03-11
Payer: MEDICARE

## 2024-03-11 VITALS
WEIGHT: 130 LBS | BODY MASS INDEX: 26.21 KG/M2 | DIASTOLIC BLOOD PRESSURE: 60 MMHG | OXYGEN SATURATION: 96 % | HEART RATE: 58 BPM | HEIGHT: 59 IN | SYSTOLIC BLOOD PRESSURE: 150 MMHG

## 2024-03-11 DIAGNOSIS — I20.89 OTHER FORMS OF ANGINA PECTORIS: Primary | ICD-10-CM

## 2024-03-11 DIAGNOSIS — I10 PRIMARY HYPERTENSION: ICD-10-CM

## 2024-03-11 DIAGNOSIS — E78.2 MIXED HYPERLIPIDEMIA: ICD-10-CM

## 2024-03-11 DIAGNOSIS — E11.9 TYPE 2 DIABETES MELLITUS WITHOUT COMPLICATION, WITHOUT LONG-TERM CURRENT USE OF INSULIN: ICD-10-CM

## 2024-03-11 PROCEDURE — 99214 OFFICE O/P EST MOD 30 MIN: CPT | Performed by: INTERNAL MEDICINE

## 2024-03-11 RX ORDER — FUROSEMIDE 40 MG/1
40 TABLET ORAL DAILY
COMMUNITY

## 2024-03-11 RX ORDER — OMEPRAZOLE 20 MG/1
20 CAPSULE, DELAYED RELEASE ORAL DAILY
COMMUNITY

## 2024-03-11 NOTE — PROGRESS NOTES
[facility] Cardiology  Office Progress Note  Ninoska Gloria  2/8/1931  [address] [facility] [address]       Visit Date: 03/11/24    PCP: Lizette Fitzpatrick, PA  [address] [address]    IDENTIFICATION: A 89 y.o.  female from [place], [place]    PROBLEM LIST:   CAD  Inferior MI 1996 s/p tpa  stent to RCA and LAD 1996 Dr. Velasquez  stress Echo 2006 - WNL  Echo 8/2012 - normal LV function, no significant valvular abnormalities  8/16/13 MPS: Abnormal large sized severely fixed inferior defect, moderate size moderately fixed lateral defect with no reversibility, EF 45%  Pulmonary hypertension  4/17/18 echo: EF 56%, mild concentric LVH, grade 1 diastolic dysfunction, mild calcification of the aortic valve, mild MR, mild TR, RVSP 79 mmHg, small pericardial effusion  CT chest - 2020 bronchiectasis  A-fib/flutter  [id number] 6 no AC due to falls  Hypertension  Dyslipidemia  DM 2, on insulin  4/19/18 A1c 8.7  CKD  7/23 1.4 Dr Gonzalez  Hypothyroidism  GERD  History of retinal detachment  Frequent falls  Diverticulosis  ACUNA  Cholelithiasis  Surgical history  5 c section          CC:   Chief Complaint   Patient presents with    Coronary Artery Disease       Allergies  No Known Allergies    Current Medications  Current Outpatient Medications   Medication Instructions    acetaminophen (TYLENOL) 1,000 mg, Oral, 2 Times Daily PRN    aspirin 81 mg, Oral, Daily    Cranberry 400 MG tablet 1 tablet, Oral, Nightly    dilTIAZem CD (CARDIZEM CD) 240 mg, Oral, Daily    fish oil 2,400 mg, Oral, Daily    Flaxseed, Linseed, (Flaxseed Oil) 1000 MG capsule 2 capsules, Oral, 2 Times Daily    furosemide (LASIX) 40 mg, Oral, Daily    glipizide (GLUCOTROL) 20 mg, Oral, 2 Times Daily Before Meals    hydrALAZINE (APRESOLINE) 50 mg, Oral, 2 Times Daily    insulin detemir (LEVEMIR) 70 Units, Subcutaneous, Daily    isosorbide mononitrate (IMDUR) 30 mg, Oral, Daily    levothyroxine (SYNTHROID, LEVOTHROID) 88  "mcg, Oral, Daily    Magnesium 500 MG capsule 1 tablet, Oral, As Needed    metoprolol tartrate (LOPRESSOR) 25 mg, Oral, 2 Times Daily    multivitamin with minerals (MULTIVITAMIN ADULTS 50+ PO) 1 tablet, Oral, Daily    nitroglycerin (NITROSTAT) 0.4 MG SL tablet No dose, route, or frequency recorded.    omeprazole (PRILOSEC) 20 mg, Oral, Daily, Every other day    simvastatin (ZOCOR) 10 mg, Oral, Nightly    triamcinolone (KENALOG) 0.1 % ointment 1 application , Topical, Daily PRN    vitamin B-12 (CYANOCOBALAMIN) 1,000 mcg, Oral, 4 Times Weekly        History of Present Illness   Ninoska Gloria is a 89 y.o. year old female here for follow up.  Accompanied by her daughters.  No recurrent angina but continues with poor balance and falls.  Now utilizing a wheelchair.  States she needs some ramps at her home.         OBJECTIVE:  Vitals:    03/11/24 1035 03/11/24 1102   BP: 160/74 150/60   BP Location: Right arm Right arm   Patient Position: Sitting    Cuff Size: Adult    Pulse: 58    SpO2: 96%    Weight: 59 kg (130 lb)    Height: 149.9 cm (59\")        Body mass index is 26.26 kg/m².    Constitutional:       Appearance: Healthy appearance. Not in distress.   Neck:      Vascular: No JVR. JVD normal.   Pulmonary:      Effort: Pulmonary effort is normal.      Breath sounds: Normal breath sounds. No wheezing. No rhonchi. No rales.   Chest:      Chest wall: Not tender to palpatation.   Cardiovascular:      PMI at left midclavicular line. Normal rate. Regular rhythm. Normal S1. Normal S2.       Murmurs: There is a systolic murmur.      No gallop.  No click. No rub.   Pulses:     Intact distal pulses.   Edema:     Peripheral edema absent.   Abdominal:      General: Bowel sounds are normal.      Palpations: Abdomen is soft.      Tenderness: There is no abdominal tenderness.   Musculoskeletal: Normal range of motion.         General: No tenderness. Skin:     General: Skin is warm and dry.   Neurological:      General: No focal deficit " present.      Mental Status: Alert and oriented to person, place and time.         Diagnostic Data:  Procedures        ASSESSMENT:   Diagnosis Plan   1. Other forms of angina pectoris        2. Primary hypertension        3. Mixed hyperlipidemia        4. Type 2 diabetes mellitus without complication, without long-term current use of insulin              PLAN:  Stable angina we will advance antianginal medications with Imdur 30.    Hypertension controlled currently on hydralazine Toprol diltiazem    Mixed dyslipidemia controlled on statin therapy    Diabetes insulin requiring pending recent A1c          Teo Peoples MD, FACC

## 2024-07-01 ENCOUNTER — APPOINTMENT (OUTPATIENT)
Dept: GENERAL RADIOLOGY | Facility: HOSPITAL | Age: 89
End: 2024-07-01
Payer: MEDICARE

## 2024-07-01 ENCOUNTER — APPOINTMENT (OUTPATIENT)
Dept: CT IMAGING | Facility: HOSPITAL | Age: 89
End: 2024-07-01
Payer: MEDICARE

## 2024-07-01 ENCOUNTER — HOSPITAL ENCOUNTER (EMERGENCY)
Facility: HOSPITAL | Age: 89
Discharge: HOME | End: 2024-07-01
Attending: EMERGENCY MEDICINE
Payer: MEDICARE

## 2024-07-01 VITALS
OXYGEN SATURATION: 95 % | DIASTOLIC BLOOD PRESSURE: 70 MMHG | BODY MASS INDEX: 26.21 KG/M2 | TEMPERATURE: 97.8 F | HEART RATE: 75 BPM | HEIGHT: 59 IN | WEIGHT: 130 LBS | SYSTOLIC BLOOD PRESSURE: 190 MMHG | RESPIRATION RATE: 16 BRPM

## 2024-07-01 DIAGNOSIS — N39.0 UTI (URINARY TRACT INFECTION) WITH PYURIA: Primary | ICD-10-CM

## 2024-07-01 LAB
ALBUMIN SERPL-MCNC: 3.5 G/DL (ref 3.5–5.2)
ALBUMIN/GLOB SERPL: 1 G/DL
ALP SERPL-CCNC: 129 U/L (ref 39–117)
ALT SERPL W P-5'-P-CCNC: 29 U/L (ref 1–33)
ANION GAP SERPL CALCULATED.3IONS-SCNC: 11 MMOL/L (ref 5–15)
AST SERPL-CCNC: 49 U/L (ref 1–32)
BACTERIA UR QL AUTO: ABNORMAL /HPF
BASOPHILS # BLD AUTO: 0.02 10*3/MM3 (ref 0–0.2)
BASOPHILS NFR BLD AUTO: 0.2 % (ref 0–1.5)
BILIRUB SERPL-MCNC: <0.2 MG/DL (ref 0–1.2)
BILIRUB UR QL STRIP: NEGATIVE
BUN SERPL-MCNC: 38 MG/DL (ref 8–23)
BUN/CREAT SERPL: 24.5 (ref 7–25)
CALCIUM SPEC-SCNC: 9.3 MG/DL (ref 8.2–9.6)
CHLORIDE SERPL-SCNC: 106 MMOL/L (ref 98–107)
CLARITY UR: CLEAR
CO2 SERPL-SCNC: 25 MMOL/L (ref 22–29)
COLOR UR: YELLOW
CREAT SERPL-MCNC: 1.55 MG/DL (ref 0.57–1)
D-LACTATE SERPL-SCNC: 1.3 MMOL/L (ref 0.5–2)
DEPRECATED RDW RBC AUTO: 50.1 FL (ref 37–54)
EGFRCR SERPLBLD CKD-EPI 2021: 31.1 ML/MIN/1.73
EOSINOPHIL # BLD AUTO: 0.04 10*3/MM3 (ref 0–0.4)
EOSINOPHIL NFR BLD AUTO: 0.5 % (ref 0.3–6.2)
ERYTHROCYTE [DISTWIDTH] IN BLOOD BY AUTOMATED COUNT: 16.5 % (ref 12.3–15.4)
GEN 5 2HR TROPONIN T REFLEX: 82 NG/L
GLOBULIN UR ELPH-MCNC: 3.5 GM/DL
GLUCOSE SERPL-MCNC: 152 MG/DL (ref 65–99)
GLUCOSE UR STRIP-MCNC: NEGATIVE MG/DL
HCT VFR BLD AUTO: 35.4 % (ref 34–46.6)
HGB BLD-MCNC: 10.3 G/DL (ref 12–15.9)
HGB UR QL STRIP.AUTO: NEGATIVE
HYALINE CASTS UR QL AUTO: ABNORMAL /LPF
IMM GRANULOCYTES # BLD AUTO: 0.02 10*3/MM3 (ref 0–0.05)
IMM GRANULOCYTES NFR BLD AUTO: 0.2 % (ref 0–0.5)
KETONES UR QL STRIP: NEGATIVE
LEUKOCYTE ESTERASE UR QL STRIP.AUTO: ABNORMAL
LIPASE SERPL-CCNC: 38 U/L (ref 13–60)
LYMPHOCYTES # BLD AUTO: 1.38 10*3/MM3 (ref 0.7–3.1)
LYMPHOCYTES NFR BLD AUTO: 16.9 % (ref 19.6–45.3)
MAGNESIUM SERPL-MCNC: 1.8 MG/DL (ref 1.7–2.3)
MCH RBC QN AUTO: 24.2 PG (ref 26.6–33)
MCHC RBC AUTO-ENTMCNC: 29.1 G/DL (ref 31.5–35.7)
MCV RBC AUTO: 83.3 FL (ref 79–97)
MONOCYTES # BLD AUTO: 0.78 10*3/MM3 (ref 0.1–0.9)
MONOCYTES NFR BLD AUTO: 9.6 % (ref 5–12)
NEUTROPHILS NFR BLD AUTO: 5.91 10*3/MM3 (ref 1.7–7)
NEUTROPHILS NFR BLD AUTO: 72.6 % (ref 42.7–76)
NITRITE UR QL STRIP: POSITIVE
NRBC BLD AUTO-RTO: 0 /100 WBC (ref 0–0.2)
NT-PROBNP SERPL-MCNC: 1648 PG/ML (ref 0–1800)
PH UR STRIP.AUTO: 5.5 [PH] (ref 5–8)
PHOSPHATE SERPL-MCNC: 3.4 MG/DL (ref 2.5–4.5)
PLATELET # BLD AUTO: 235 10*3/MM3 (ref 140–450)
PMV BLD AUTO: 10.5 FL (ref 6–12)
POTASSIUM SERPL-SCNC: 4.3 MMOL/L (ref 3.5–5.2)
PROCALCITONIN SERPL-MCNC: 0.09 NG/ML (ref 0–0.25)
PROT SERPL-MCNC: 7 G/DL (ref 6–8.5)
PROT UR QL STRIP: ABNORMAL
RBC # BLD AUTO: 4.25 10*6/MM3 (ref 3.77–5.28)
RBC # UR STRIP: ABNORMAL /HPF
REF LAB TEST METHOD: ABNORMAL
SODIUM SERPL-SCNC: 142 MMOL/L (ref 136–145)
SP GR UR STRIP: 1.02 (ref 1–1.03)
SQUAMOUS #/AREA URNS HPF: ABNORMAL /HPF
TROPONIN T DELTA: 9 NG/L
TROPONIN T SERPL HS-MCNC: 73 NG/L
TSH SERPL DL<=0.05 MIU/L-ACNC: 0.99 UIU/ML (ref 0.27–4.2)
UROBILINOGEN UR QL STRIP: ABNORMAL
WBC # UR STRIP: ABNORMAL /HPF
WBC NRBC COR # BLD AUTO: 8.15 10*3/MM3 (ref 3.4–10.8)

## 2024-07-01 PROCEDURE — 83690 ASSAY OF LIPASE: CPT

## 2024-07-01 PROCEDURE — 71045 X-RAY EXAM CHEST 1 VIEW: CPT

## 2024-07-01 PROCEDURE — 87077 CULTURE AEROBIC IDENTIFY: CPT

## 2024-07-01 PROCEDURE — 96372 THER/PROPH/DIAG INJ SC/IM: CPT

## 2024-07-01 PROCEDURE — 84443 ASSAY THYROID STIM HORMONE: CPT

## 2024-07-01 PROCEDURE — 25010000002 CEFTRIAXONE PER 250 MG

## 2024-07-01 PROCEDURE — 83880 ASSAY OF NATRIURETIC PEPTIDE: CPT

## 2024-07-01 PROCEDURE — 87086 URINE CULTURE/COLONY COUNT: CPT

## 2024-07-01 PROCEDURE — 84100 ASSAY OF PHOSPHORUS: CPT

## 2024-07-01 PROCEDURE — 83605 ASSAY OF LACTIC ACID: CPT

## 2024-07-01 PROCEDURE — 87040 BLOOD CULTURE FOR BACTERIA: CPT

## 2024-07-01 PROCEDURE — 99284 EMERGENCY DEPT VISIT MOD MDM: CPT

## 2024-07-01 PROCEDURE — 87186 SC STD MICRODIL/AGAR DIL: CPT

## 2024-07-01 PROCEDURE — 83735 ASSAY OF MAGNESIUM: CPT

## 2024-07-01 PROCEDURE — 93005 ELECTROCARDIOGRAM TRACING: CPT

## 2024-07-01 PROCEDURE — 36415 COLL VENOUS BLD VENIPUNCTURE: CPT

## 2024-07-01 PROCEDURE — 84145 PROCALCITONIN (PCT): CPT

## 2024-07-01 PROCEDURE — 85025 COMPLETE CBC W/AUTO DIFF WBC: CPT

## 2024-07-01 PROCEDURE — 70450 CT HEAD/BRAIN W/O DYE: CPT

## 2024-07-01 PROCEDURE — 80053 COMPREHEN METABOLIC PANEL: CPT

## 2024-07-01 PROCEDURE — 81001 URINALYSIS AUTO W/SCOPE: CPT

## 2024-07-01 PROCEDURE — 84484 ASSAY OF TROPONIN QUANT: CPT

## 2024-07-01 RX ORDER — BLOOD-GLUCOSE SENSOR
EACH MISCELLANEOUS
COMMUNITY
Start: 2024-06-29

## 2024-07-01 RX ORDER — CEFDINIR 300 MG/1
300 CAPSULE ORAL 2 TIMES DAILY
Qty: 14 CAPSULE | Refills: 0 | Status: SHIPPED | OUTPATIENT
Start: 2024-07-01 | End: 2024-07-08 | Stop reason: HOSPADM

## 2024-07-01 RX ORDER — CEFTRIAXONE 2 G/1
1 INJECTION, POWDER, FOR SOLUTION INTRAMUSCULAR; INTRAVENOUS ONCE
Status: DISCONTINUED | OUTPATIENT
Start: 2024-07-01 | End: 2024-07-01

## 2024-07-01 RX ORDER — DILTIAZEM HYDROCHLORIDE 240 MG/1
240 CAPSULE, COATED, EXTENDED RELEASE ORAL ONCE
Status: COMPLETED | OUTPATIENT
Start: 2024-07-01 | End: 2024-07-01

## 2024-07-01 RX ORDER — ISOSORBIDE MONONITRATE 30 MG/1
30 TABLET, EXTENDED RELEASE ORAL ONCE
Status: COMPLETED | OUTPATIENT
Start: 2024-07-01 | End: 2024-07-01

## 2024-07-01 RX ORDER — KETOROLAC TROMETHAMINE 30 MG/ML
INJECTION, SOLUTION INTRAMUSCULAR; INTRAVENOUS
COMMUNITY
Start: 2024-04-30

## 2024-07-01 RX ADMIN — DILTIAZEM HYDROCHLORIDE 240 MG: 240 CAPSULE, COATED, EXTENDED RELEASE ORAL at 18:51

## 2024-07-01 RX ADMIN — LIDOCAINE HYDROCHLORIDE 1 G: 10 INJECTION, SOLUTION EPIDURAL; INFILTRATION; INTRACAUDAL; PERINEURAL at 20:07

## 2024-07-01 RX ADMIN — ISOSORBIDE MONONITRATE 30 MG: 30 TABLET, EXTENDED RELEASE ORAL at 18:51

## 2024-07-01 NOTE — ED PROVIDER NOTES
"Subjective   History of Present Illness patient is a 89-year-old female accompanied by her adult daughter, transporting her in a wheelchair.  The daughter reports that her mom awoke this morning approximately 11 AM altered, some dysphagia or dysphonia, but she had taken out her oxygen that she normally wears overnight, daughter reports that she was not herself.  The patient endorses generalized weakness, she did not go to [employer] yesterday per her typical she just states she, \"feels weak all over.\"  But denies any new pain.  She endorses she has been eating and drinking with normal elimination patterns.    Review of Systems   Constitutional:  Positive for fatigue. Negative for chills and diaphoresis.   HENT: Negative.     Respiratory: Negative.     Cardiovascular: Negative.    Gastrointestinal: Negative.    Genitourinary: Negative.    Musculoskeletal: Negative.    Skin: Negative.    Neurological:  Positive for weakness.       Past Medical History:   Diagnosis Date    Abnormal ECG     Anemia     Anxiety and depression     Arrhythmia     Atrial fibrillation     Daughter reported \"in the past\"    Back pain     Cancer     Daughter reported skin cancer removed from nose in the past    CHF (congestive heart failure)     Cirrhosis     CKD (chronic kidney disease), stage III     \"Renal failure stage 3\"    Constipation     Reported intermittent episodes    COPD (chronic obstructive pulmonary disease)     Coronary artery disease     Patient daughter reported 2 coronary stents    COVID-19 09/2021    Diabetes mellitus 1990's    Diarrhea     Reported intermittent episodes    Disease of thyroid gland     Elevated cholesterol     Full dentures     Patient's daughter advised no adhesives DOS    GERD (gastroesophageal reflux disease)     Headache     Heart murmur     High triglycerides     History of blood transfusion     Patient's daughter reported unsure but this was previously noted in patients record    History of bronchitis     " History of pneumonia     [place] (hard of hearing)     Patient's daughter reported severely [place] and that patient has hearing aids but doesn't wear them    Hyperlipidemia     Hypertension     Iron deficiency 1996    Myocardial infarction     Patient's daughter reported placement of 2 stents    Osteoarthritis     Ovarian cyst     Pneumonia     Retinal detachment     Shingles     about to finish treatment up     SOB (shortness of breath)     Vision problems        No Known Allergies    Past Surgical History:   Procedure Laterality Date    CARDIAC CATHETERIZATION      Patient's daughter reported placement of 2 stents    CATARACT EXTRACTION Bilateral 2009     SECTION      x 5: 1948, 1950, , ,     COLONOSCOPY      CORONARY STENT PLACEMENT      ENDOSCOPY N/A 2021    Procedure: ESOPHAGOGASTRODUODENOSCOPY WITH BIOPSY;  Surgeon: Marlon Dixon MD;  Location: [facility] ENDOSCOPY;  Service: Gastroenterology;  Laterality: N/A;    MOUTH SURGERY      full mouth extraction    SKIN CANCER EXCISION      TUBAL ABDOMINAL LIGATION         Family History   Problem Relation Age of Onset    COPD Mother     COPD Father     Heart attack Sister     Liver cancer Brother     No Known Problems Maternal Grandmother     No Known Problems Maternal Grandfather     No Known Problems Paternal Grandmother     No Known Problems Paternal Grandfather     Colon cancer Neg Hx     Liver disease Neg Hx     Cirrhosis Neg Hx        Social History     Socioeconomic History    Marital status:    Tobacco Use    Smoking status: Never     Passive exposure: Never    Smokeless tobacco: Never   Vaping Use    Vaping status: Never Used   Substance and Sexual Activity    Alcohol use: No    Drug use: No    Sexual activity: Not Currently     Partners: Male     Birth control/protection: Surgical, Post-menopausal           Objective   Physical Exam  Constitutional:       Appearance: She is well-developed. She is  not toxic-appearing.   HENT:      Head: Normocephalic.      Mouth/Throat:      Mouth: Mucous membranes are moist.   Eyes:      Extraocular Movements: Extraocular movements intact.      Pupils: Pupils are equal, round, and reactive to light.   Cardiovascular:      Rate and Rhythm: Normal rate.   Pulmonary:      Effort: Pulmonary effort is normal.   Abdominal:      General: Bowel sounds are normal.      Palpations: Abdomen is soft.   Musculoskeletal:         General: Normal range of motion.      Cervical back: Normal range of motion.   Skin:     General: Skin is warm and dry.   Neurological:      Mental Status: She is alert.      GCS: GCS eye subscore is 4. GCS verbal subscore is 5. GCS motor subscore is 6.   Psychiatric:         Mood and Affect: Mood normal.         Behavior: Behavior normal.         Procedures           ED Course  ED Course as of 07/01/24 2014 Mon Jul 01, 2024   1457 Initial evaluation the patient ED pit area, transported by wheelchair, accompanied by her daughter. [JH]   1650 Patient's troponin elevated 73, in the setting chronic kidney disease and reduced clearance.  BNP also elevated, CBC without acute abnormality, CMP baseline chronic kidney disease similar to comparison. [JH]   1651 CT head without acute abnormality, plain film chest with small left pleural effusion, coarse interstitial markings which could be consistent with pulmonary edema versus atypical pneumonia. [JH]   1653 Urinalysis resulted, with TNTC pyuria, bacteriuria, positive nitrite, consistent with acute cystitis. [JH]   2008 Discussed with the patient's daughter and son-in-law at bedside, the patient's request that she go home, she has had urinary tract infections in the past that been treated successfully, we will trial that.  They endorse that they would return for any concerns. [JH]      ED Course User Index  [JH] Oscar Garcia, AKBAR                                             Medical Decision Making  Given the patient's  complaints, the daughter's observations, differential diagnosis includes episodic dyspnea, cannot exclude electrolyte derangement, urinary tract infection, intracranial abnormality, acute coronary syndrome, acute kidney injury although less likely.  Patient will have serum screen labs, urinalysis, twelve-lead ECG, plain film imaging chest, CT imaging of the brain.  Results to be communicated disposition considered.  Patient and family agree this plan.    Amount and/or Complexity of Data Reviewed  Labs: ordered.  Radiology: ordered.  ECG/medicine tests: ordered.        Final diagnoses:   UTI (urinary tract infection) with pyuria       ED Disposition  ED Disposition       ED Disposition   Discharge    Condition   Stable    Comment   --               Lizette Fitzpatrick PA  [address] [address]  [phone]      As needed    Teo Peoples MD  [address] [id number] [phone]               Medication List        New Prescriptions      cefdinir 300 MG capsule  Commonly known as: OMNICEF  Take 1 capsule by mouth 2 (Two) Times a Day for 7 days.               Where to Get Your Medications        These medications were sent to [place], [place] - [facility] [address] - [phone]  - [phone] [facility] [place] [address], [place] [address]      Phone: [phone]   cefdinir 300 MG capsule            Oscar Garcia, APRN  07/01/24 3935

## 2024-07-02 NOTE — DISCHARGE INSTRUCTIONS
Please follow-up with your primary care provider, as well as your cardiologist.  Take the antibiotics as prescribed, if your condition worsens or you have other new concerns return to the emergency department.

## 2024-07-04 LAB — BACTERIA SPEC AEROBE CULT: ABNORMAL

## 2024-07-05 ENCOUNTER — HOSPITAL ENCOUNTER (INPATIENT)
Facility: HOSPITAL | Age: 89
LOS: 1 days | Discharge: HOME HEALTH | End: 2024-07-08
Attending: EMERGENCY MEDICINE | Admitting: INTERNAL MEDICINE
Payer: MEDICARE

## 2024-07-05 ENCOUNTER — APPOINTMENT (OUTPATIENT)
Dept: GENERAL RADIOLOGY | Facility: HOSPITAL | Age: 89
End: 2024-07-05
Payer: MEDICARE

## 2024-07-05 DIAGNOSIS — R07.9 NONSPECIFIC CHEST PAIN: Primary | ICD-10-CM

## 2024-07-05 PROBLEM — N39.0 UTI (URINARY TRACT INFECTION): Status: ACTIVE | Noted: 2024-07-05

## 2024-07-05 LAB
ALBUMIN SERPL-MCNC: 3.8 G/DL (ref 3.5–5.2)
ALBUMIN/GLOB SERPL: 1 G/DL
ALP SERPL-CCNC: 124 U/L (ref 39–117)
ALT SERPL W P-5'-P-CCNC: 24 U/L (ref 1–33)
ANION GAP SERPL CALCULATED.3IONS-SCNC: 9 MMOL/L (ref 5–15)
AST SERPL-CCNC: 37 U/L (ref 1–32)
B PARAPERT DNA SPEC QL NAA+PROBE: NOT DETECTED
B PERT DNA SPEC QL NAA+PROBE: NOT DETECTED
BASOPHILS # BLD AUTO: 0.02 10*3/MM3 (ref 0–0.2)
BASOPHILS NFR BLD AUTO: 0.2 % (ref 0–1.5)
BILIRUB SERPL-MCNC: 0.2 MG/DL (ref 0–1.2)
BUN SERPL-MCNC: 29 MG/DL (ref 8–23)
BUN/CREAT SERPL: 22.1 (ref 7–25)
C PNEUM DNA NPH QL NAA+NON-PROBE: NOT DETECTED
CALCIUM SPEC-SCNC: 10.1 MG/DL (ref 8.2–9.6)
CHLORIDE SERPL-SCNC: 104 MMOL/L (ref 98–107)
CO2 SERPL-SCNC: 29 MMOL/L (ref 22–29)
CREAT SERPL-MCNC: 1.31 MG/DL (ref 0.57–1)
DEPRECATED RDW RBC AUTO: 48.5 FL (ref 37–54)
EGFRCR SERPLBLD CKD-EPI 2021: 38.1 ML/MIN/1.73
EOSINOPHIL # BLD AUTO: 0.13 10*3/MM3 (ref 0–0.4)
EOSINOPHIL NFR BLD AUTO: 1.5 % (ref 0.3–6.2)
ERYTHROCYTE [DISTWIDTH] IN BLOOD BY AUTOMATED COUNT: 16 % (ref 12.3–15.4)
FLUAV SUBTYP SPEC NAA+PROBE: NOT DETECTED
FLUBV RNA ISLT QL NAA+PROBE: NOT DETECTED
GEN 5 2HR TROPONIN T REFLEX: 63 NG/L
GLOBULIN UR ELPH-MCNC: 3.7 GM/DL
GLUCOSE BLDC GLUCOMTR-MCNC: 135 MG/DL (ref 70–130)
GLUCOSE BLDC GLUCOMTR-MCNC: 138 MG/DL (ref 70–130)
GLUCOSE SERPL-MCNC: 84 MG/DL (ref 65–99)
HADV DNA SPEC NAA+PROBE: NOT DETECTED
HCOV 229E RNA SPEC QL NAA+PROBE: NOT DETECTED
HCOV HKU1 RNA SPEC QL NAA+PROBE: NOT DETECTED
HCOV NL63 RNA SPEC QL NAA+PROBE: NOT DETECTED
HCOV OC43 RNA SPEC QL NAA+PROBE: NOT DETECTED
HCT VFR BLD AUTO: 37.1 % (ref 34–46.6)
HGB BLD-MCNC: 11.2 G/DL (ref 12–15.9)
HMPV RNA NPH QL NAA+NON-PROBE: NOT DETECTED
HOLD SPECIMEN: NORMAL
HPIV1 RNA ISLT QL NAA+PROBE: NOT DETECTED
HPIV2 RNA SPEC QL NAA+PROBE: NOT DETECTED
HPIV3 RNA NPH QL NAA+PROBE: NOT DETECTED
HPIV4 P GENE NPH QL NAA+PROBE: NOT DETECTED
IMM GRANULOCYTES # BLD AUTO: 0.03 10*3/MM3 (ref 0–0.05)
IMM GRANULOCYTES NFR BLD AUTO: 0.4 % (ref 0–0.5)
LIPASE SERPL-CCNC: 36 U/L (ref 13–60)
LYMPHOCYTES # BLD AUTO: 1.39 10*3/MM3 (ref 0.7–3.1)
LYMPHOCYTES NFR BLD AUTO: 16.4 % (ref 19.6–45.3)
M PNEUMO IGG SER IA-ACNC: NOT DETECTED
MCH RBC QN AUTO: 25 PG (ref 26.6–33)
MCHC RBC AUTO-ENTMCNC: 30.2 G/DL (ref 31.5–35.7)
MCV RBC AUTO: 82.8 FL (ref 79–97)
MONOCYTES # BLD AUTO: 0.78 10*3/MM3 (ref 0.1–0.9)
MONOCYTES NFR BLD AUTO: 9.2 % (ref 5–12)
NEUTROPHILS NFR BLD AUTO: 6.15 10*3/MM3 (ref 1.7–7)
NEUTROPHILS NFR BLD AUTO: 72.3 % (ref 42.7–76)
NRBC BLD AUTO-RTO: 0 /100 WBC (ref 0–0.2)
NT-PROBNP SERPL-MCNC: 3885 PG/ML (ref 0–1800)
PLATELET # BLD AUTO: 249 10*3/MM3 (ref 140–450)
PMV BLD AUTO: 10.1 FL (ref 6–12)
POTASSIUM SERPL-SCNC: 4.5 MMOL/L (ref 3.5–5.2)
PROT SERPL-MCNC: 7.5 G/DL (ref 6–8.5)
QT INTERVAL: 404 MS
QT INTERVAL: 428 MS
QTC INTERVAL: 426 MS
QTC INTERVAL: 462 MS
RBC # BLD AUTO: 4.48 10*6/MM3 (ref 3.77–5.28)
RHINOVIRUS RNA SPEC NAA+PROBE: NOT DETECTED
RSV RNA NPH QL NAA+NON-PROBE: NOT DETECTED
SARS-COV-2 RNA NPH QL NAA+NON-PROBE: NOT DETECTED
SODIUM SERPL-SCNC: 142 MMOL/L (ref 136–145)
TROPONIN T DELTA: -4 NG/L
TROPONIN T SERPL HS-MCNC: 67 NG/L
WBC NRBC COR # BLD AUTO: 8.5 10*3/MM3 (ref 3.4–10.8)
WHOLE BLOOD HOLD COAG: NORMAL
WHOLE BLOOD HOLD SPECIMEN: NORMAL

## 2024-07-05 PROCEDURE — 83880 ASSAY OF NATRIURETIC PEPTIDE: CPT | Performed by: EMERGENCY MEDICINE

## 2024-07-05 PROCEDURE — 25010000002 ENOXAPARIN PER 10 MG: Performed by: INTERNAL MEDICINE

## 2024-07-05 PROCEDURE — 84484 ASSAY OF TROPONIN QUANT: CPT | Performed by: EMERGENCY MEDICINE

## 2024-07-05 PROCEDURE — 82948 REAGENT STRIP/BLOOD GLUCOSE: CPT

## 2024-07-05 PROCEDURE — 36415 COLL VENOUS BLD VENIPUNCTURE: CPT

## 2024-07-05 PROCEDURE — 99223 1ST HOSP IP/OBS HIGH 75: CPT | Performed by: NURSE PRACTITIONER

## 2024-07-05 PROCEDURE — 83690 ASSAY OF LIPASE: CPT | Performed by: EMERGENCY MEDICINE

## 2024-07-05 PROCEDURE — 25010000002 NITROGLYCERIN 200 MCG/ML SOLUTION: Performed by: INTERNAL MEDICINE

## 2024-07-05 PROCEDURE — 80053 COMPREHEN METABOLIC PANEL: CPT | Performed by: EMERGENCY MEDICINE

## 2024-07-05 PROCEDURE — 93005 ELECTROCARDIOGRAM TRACING: CPT | Performed by: EMERGENCY MEDICINE

## 2024-07-05 PROCEDURE — G0378 HOSPITAL OBSERVATION PER HR: HCPCS

## 2024-07-05 PROCEDURE — 25010000002 FUROSEMIDE PER 20 MG: Performed by: INTERNAL MEDICINE

## 2024-07-05 PROCEDURE — 71045 X-RAY EXAM CHEST 1 VIEW: CPT

## 2024-07-05 PROCEDURE — 99291 CRITICAL CARE FIRST HOUR: CPT

## 2024-07-05 PROCEDURE — 99214 OFFICE O/P EST MOD 30 MIN: CPT | Performed by: INTERNAL MEDICINE

## 2024-07-05 PROCEDURE — 0202U NFCT DS 22 TRGT SARS-COV-2: CPT | Performed by: EMERGENCY MEDICINE

## 2024-07-05 PROCEDURE — 85025 COMPLETE CBC W/AUTO DIFF WBC: CPT | Performed by: EMERGENCY MEDICINE

## 2024-07-05 RX ORDER — SODIUM CHLORIDE 0.9 % (FLUSH) 0.9 %
10 SYRINGE (ML) INJECTION AS NEEDED
Status: DISCONTINUED | OUTPATIENT
Start: 2024-07-05 | End: 2024-07-08 | Stop reason: HOSPADM

## 2024-07-05 RX ORDER — NITROGLYCERIN 0.4 MG/1
0.4 TABLET SUBLINGUAL
Status: DISCONTINUED | OUTPATIENT
Start: 2024-07-05 | End: 2024-07-08 | Stop reason: HOSPADM

## 2024-07-05 RX ORDER — SUCRALFATE 1 G/1
1 TABLET ORAL ONCE
Status: COMPLETED | OUTPATIENT
Start: 2024-07-05 | End: 2024-07-05

## 2024-07-05 RX ORDER — FUROSEMIDE 10 MG/ML
20 INJECTION INTRAMUSCULAR; INTRAVENOUS ONCE
Status: COMPLETED | OUTPATIENT
Start: 2024-07-05 | End: 2024-07-05

## 2024-07-05 RX ORDER — BISACODYL 5 MG/1
5 TABLET, DELAYED RELEASE ORAL DAILY PRN
Status: DISCONTINUED | OUTPATIENT
Start: 2024-07-05 | End: 2024-07-08 | Stop reason: HOSPADM

## 2024-07-05 RX ORDER — CEFDINIR 300 MG/1
300 CAPSULE ORAL 2 TIMES DAILY
Status: DISCONTINUED | OUTPATIENT
Start: 2024-07-05 | End: 2024-07-06

## 2024-07-05 RX ORDER — SODIUM CHLORIDE 0.9 % (FLUSH) 0.9 %
10 SYRINGE (ML) INJECTION EVERY 12 HOURS SCHEDULED
Status: DISCONTINUED | OUTPATIENT
Start: 2024-07-05 | End: 2024-07-08 | Stop reason: HOSPADM

## 2024-07-05 RX ORDER — INSULIN LISPRO 100 [IU]/ML
2-9 INJECTION, SOLUTION INTRAVENOUS; SUBCUTANEOUS
Status: DISCONTINUED | OUTPATIENT
Start: 2024-07-05 | End: 2024-07-08 | Stop reason: HOSPADM

## 2024-07-05 RX ORDER — LEVOTHYROXINE SODIUM 88 UG/1
88 TABLET ORAL DAILY
Status: DISCONTINUED | OUTPATIENT
Start: 2024-07-06 | End: 2024-07-08 | Stop reason: HOSPADM

## 2024-07-05 RX ORDER — BISACODYL 10 MG
10 SUPPOSITORY, RECTAL RECTAL DAILY PRN
Status: DISCONTINUED | OUTPATIENT
Start: 2024-07-05 | End: 2024-07-08 | Stop reason: HOSPADM

## 2024-07-05 RX ORDER — LANOLIN ALCOHOL/MO/W.PET/CERES
1000 CREAM (GRAM) TOPICAL
Status: DISCONTINUED | OUTPATIENT
Start: 2024-07-06 | End: 2024-07-08 | Stop reason: HOSPADM

## 2024-07-05 RX ORDER — ALUMINA, MAGNESIA, AND SIMETHICONE 2400; 2400; 240 MG/30ML; MG/30ML; MG/30ML
30 SUSPENSION ORAL ONCE
Status: DISCONTINUED | OUTPATIENT
Start: 2024-07-05 | End: 2024-07-05

## 2024-07-05 RX ORDER — SODIUM CHLORIDE 9 MG/ML
40 INJECTION, SOLUTION INTRAVENOUS AS NEEDED
Status: DISCONTINUED | OUTPATIENT
Start: 2024-07-05 | End: 2024-07-08 | Stop reason: HOSPADM

## 2024-07-05 RX ORDER — ASPIRIN 81 MG/1
81 TABLET ORAL DAILY
Status: DISCONTINUED | OUTPATIENT
Start: 2024-07-06 | End: 2024-07-08 | Stop reason: HOSPADM

## 2024-07-05 RX ORDER — MULTIPLE VITAMINS W/ MINERALS TAB 9MG-400MCG
1 TAB ORAL DAILY
Status: DISCONTINUED | OUTPATIENT
Start: 2024-07-06 | End: 2024-07-08 | Stop reason: HOSPADM

## 2024-07-05 RX ORDER — PANTOPRAZOLE SODIUM 40 MG/1
40 TABLET, DELAYED RELEASE ORAL
Status: DISCONTINUED | OUTPATIENT
Start: 2024-07-06 | End: 2024-07-08 | Stop reason: HOSPADM

## 2024-07-05 RX ORDER — AMOXICILLIN 250 MG
2 CAPSULE ORAL 2 TIMES DAILY PRN
Status: DISCONTINUED | OUTPATIENT
Start: 2024-07-05 | End: 2024-07-08 | Stop reason: HOSPADM

## 2024-07-05 RX ORDER — ATORVASTATIN CALCIUM 40 MG/1
40 TABLET, FILM COATED ORAL NIGHTLY
Status: DISCONTINUED | OUTPATIENT
Start: 2024-07-05 | End: 2024-07-08 | Stop reason: HOSPADM

## 2024-07-05 RX ORDER — POLYETHYLENE GLYCOL 3350 17 G/17G
17 POWDER, FOR SOLUTION ORAL DAILY PRN
Status: DISCONTINUED | OUTPATIENT
Start: 2024-07-05 | End: 2024-07-08 | Stop reason: HOSPADM

## 2024-07-05 RX ORDER — ENOXAPARIN SODIUM 100 MG/ML
1 INJECTION SUBCUTANEOUS EVERY 24 HOURS
Status: DISCONTINUED | OUTPATIENT
Start: 2024-07-05 | End: 2024-07-06

## 2024-07-05 RX ORDER — IBUPROFEN 600 MG/1
1 TABLET ORAL
Status: DISCONTINUED | OUTPATIENT
Start: 2024-07-05 | End: 2024-07-08 | Stop reason: HOSPADM

## 2024-07-05 RX ORDER — NITROGLYCERIN 20 MG/100ML
10-50 INJECTION INTRAVENOUS
Status: DISCONTINUED | OUTPATIENT
Start: 2024-07-05 | End: 2024-07-07

## 2024-07-05 RX ORDER — NICOTINE POLACRILEX 4 MG
15 LOZENGE BUCCAL
Status: DISCONTINUED | OUTPATIENT
Start: 2024-07-05 | End: 2024-07-08 | Stop reason: HOSPADM

## 2024-07-05 RX ORDER — ACETAMINOPHEN 500 MG
1000 TABLET ORAL 2 TIMES DAILY PRN
Status: DISCONTINUED | OUTPATIENT
Start: 2024-07-05 | End: 2024-07-08 | Stop reason: HOSPADM

## 2024-07-05 RX ORDER — ASPIRIN 81 MG/1
324 TABLET, CHEWABLE ORAL ONCE
Status: COMPLETED | OUTPATIENT
Start: 2024-07-05 | End: 2024-07-05

## 2024-07-05 RX ORDER — DEXTROSE MONOHYDRATE 25 G/50ML
25 INJECTION, SOLUTION INTRAVENOUS
Status: DISCONTINUED | OUTPATIENT
Start: 2024-07-05 | End: 2024-07-08 | Stop reason: HOSPADM

## 2024-07-05 RX ADMIN — ENOXAPARIN SODIUM 60 MG: 60 INJECTION SUBCUTANEOUS at 18:33

## 2024-07-05 RX ADMIN — NITROGLYCERIN 10 MCG/MIN: 20 INJECTION INTRAVENOUS at 16:13

## 2024-07-05 RX ADMIN — NITROGLYCERIN 0.4 MG: 0.4 TABLET SUBLINGUAL at 13:52

## 2024-07-05 RX ADMIN — CEFDINIR 300 MG: 300 CAPSULE ORAL at 20:13

## 2024-07-05 RX ADMIN — ATORVASTATIN CALCIUM 40 MG: 40 TABLET, FILM COATED ORAL at 20:12

## 2024-07-05 RX ADMIN — FUROSEMIDE 20 MG: 10 INJECTION, SOLUTION INTRAMUSCULAR; INTRAVENOUS at 18:32

## 2024-07-05 RX ADMIN — ASPIRIN 324 MG: 81 TABLET, CHEWABLE ORAL at 13:52

## 2024-07-05 RX ADMIN — METOPROLOL TARTRATE 25 MG: 25 TABLET, FILM COATED ORAL at 20:12

## 2024-07-05 RX ADMIN — SUCRALFATE 1 G: 1 TABLET ORAL at 18:32

## 2024-07-05 RX ADMIN — Medication 10 ML: at 20:12

## 2024-07-05 NOTE — ED NOTES
" Ninoska Gloria    Nursing Report ED to Floor:  Mental status: alert and oriented  Ambulatory status: assist x1. Walker at home  Oxygen Therapy:  room air  Cardiac Rhythm: normal sinus rhythm with occasional PVCs.  Admitted from: ED   Safety Concerns:  none noted  Social Issues: none noted   ED Room #:  14    ED Nurse Phone Extension - [phone] or may call [phone].      HPI:   Chief Complaint   Patient presents with    Chest Pain       Past Medical History:  Past Medical History:   Diagnosis Date    Abnormal ECG     Anemia     Anxiety and depression     Arrhythmia     Atrial fibrillation     Daughter reported \"in the past\"    Back pain     Cancer     Daughter reported skin cancer removed from nose in the past    CHF (congestive heart failure)     Cirrhosis     CKD (chronic kidney disease), stage III     \"Renal failure stage 3\"    Constipation     Reported intermittent episodes    COPD (chronic obstructive pulmonary disease)     Coronary artery disease     Patient daughter reported 2 coronary stents    COVID-19 09/2021    Diabetes mellitus 1990's    Diarrhea     Reported intermittent episodes    Disease of thyroid gland     Elevated cholesterol     Full dentures     Patient's daughter advised no adhesives DOS    GERD (gastroesophageal reflux disease)     Headache     Heart murmur     High triglycerides     History of blood transfusion     Patient's daughter reported unsure but this was previously noted in patients record    History of bronchitis     History of pneumonia     [place] (hard of hearing)     Patient's daughter reported severely [place] and that patient has hearing aids but doesn't wear them    Hyperlipidemia     Hypertension 1948    Iron deficiency 1996    Myocardial infarction 1996    Patient's daughter reported placement of 2 stents    Osteoarthritis     Ovarian cyst     Pneumonia     Retinal detachment 2009    Shingles     about to finish treatment up     SOB (shortness of breath)     Vision problems         Past " "Surgical History:  Past Surgical History:   Procedure Laterality Date    CARDIAC CATHETERIZATION      Patient's daughter reported placement of 2 stents    CATARACT EXTRACTION Bilateral 2009     SECTION      x 5: 1948, 1950, 1951, 195, 196    COLONOSCOPY      CORONARY STENT PLACEMENT      ENDOSCOPY N/A 2021    Procedure: ESOPHAGOGASTRODUODENOSCOPY WITH BIOPSY;  Surgeon: Marlon Dixon MD;  Location: [facility] ENDOSCOPY;  Service: Gastroenterology;  Laterality: N/A;    MOUTH SURGERY      full mouth extraction    SKIN CANCER EXCISION      TUBAL ABDOMINAL LIGATION          Admitting Doctor:   Janet Correa DO    Consulting Provider(s):  Consults       No orders found from 2024 to 2024.             Admitting Diagnosis:   The encounter diagnosis was Nonspecific chest pain.    Most Recent Vitals:   Vitals:    24 1245 24 1329 24 1402   BP: 159/76 177/74 121/100   BP Location: Right arm     Patient Position: Sitting     Pulse: (!) 47 59 61   Resp: 18     Temp: 97.7 °F (36.5 °C)     TempSrc: Oral     SpO2: 94% 95%    Weight: 56.7 kg (125 lb)     Height: 149.9 cm (59\")         Active LDAs/IV Access:   Lines, Drains & Airways       Active LDAs       Name Placement date Placement time Site Days    Peripheral IV 24 1323 Right Antecubital 24  1323  Antecubital  less than 1                    Labs (abnormal labs have a star):   Labs Reviewed   TROPONIN - Abnormal; Notable for the following components:       Result Value    HS Troponin T 67 (*)     All other components within normal limits    Narrative:     High Sensitive Troponin T Reference Range:  <14.0 ng/L- Negative Female for AMI  <22.0 ng/L- Negative Male for AMI  >=14 - Abnormal Female indicating possible myocardial injury.  >=22 - Abnormal Male indicating possible myocardial injury.   Clinicians would have to utilize clinical acumen, EKG, Troponin, and serial changes to determine if it is an " Acute Myocardial Infarction or myocardial injury due to an underlying chronic condition.        COMPREHENSIVE METABOLIC PANEL - Abnormal; Notable for the following components:    BUN 29 (*)     Creatinine 1.31 (*)     Calcium 10.1 (*)     AST (SGOT) 37 (*)     Alkaline Phosphatase 124 (*)     eGFR 38.1 (*)     All other components within normal limits    Narrative:     GFR Normal >60  Chronic Kidney Disease <60  Kidney Failure <15    The GFR formula is only valid for adults with stable renal function between ages 18 and 70.   BNP (IN-HOUSE) - Abnormal; Notable for the following components:    proBNP 3,885.0 (*)     All other components within normal limits    Narrative:     This assay is used as an aid in the diagnosis of individuals suspected of having heart failure. It can be used as an aid in the diagnosis of acute decompensated heart failure (ADHF) in patients presenting with signs and symptoms of ADHF to the emergency department (ED). In addition, NT-proBNP of <300 pg/mL indicates ADHF is not likely.    Age Range Result Interpretation  NT-proBNP Concentration (pg/mL:      <50             Positive            >450                   Gray                 300-450                    Negative             <300    50-75           Positive            >900                  Gray                300-900                  Negative            <300      >75             Positive            >1800                  Gray                300-1800                  Negative            <300   CBC WITH AUTO DIFFERENTIAL - Abnormal; Notable for the following components:    Hemoglobin 11.2 (*)     MCH 25.0 (*)     MCHC 30.2 (*)     RDW 16.0 (*)     Lymphocyte % 16.4 (*)     All other components within normal limits   RESPIRATORY PANEL PCR W/ COVID-19 (SARS-COV-2), NP SWAB IN UTM/VTP, 2 HR TAT - Normal    Narrative:     In the setting of a positive respiratory panel with a viral infection PLUS a negative procalcitonin without other underlying  concern for bacterial infection, consider observing off antibiotics or discontinuation of antibiotics and continue supportive care. If the respiratory panel is positive for atypical bacterial infection (Bordetella pertussis, Chlamydophila pneumoniae, or Mycoplasma pneumoniae), consider antibiotic de-escalation to target atypical bacterial infection.   LIPASE - Normal   COVID PRE-OP / PRE-PROCEDURE SCREENING ORDER (NO ISOLATION)    Narrative:     The following orders were created for panel order COVID PRE-OP / PRE-PROCEDURE SCREENING ORDER (NO ISOLATION) - Swab, Nasopharynx.  Procedure                               Abnormality         Status                     ---------                               -----------         ------                     Respiratory Panel PCR w/...[[id number]]  Normal              Final result                 Please view results for these tests on the individual orders.   RAINBOW DRAW    Narrative:     The following orders were created for panel order [place] Draw.  Procedure                               Abnormality         Status                     ---------                               -----------         ------                     Green Top (Gel)[[id number]]                                  Final result               Lavender Top[[id number]]                                     Final result               Gold Top - SST[[id number]]                                   Final result               Gray Top[[id number]]                                         Final result               Light Blue Top[[id number]]                                   Final result                 Please view results for these tests on the individual orders.   HIGH SENSITIVITIY TROPONIN T 2HR   CBC AND DIFFERENTIAL    Narrative:     The following orders were created for panel order CBC & Differential.  Procedure                               Abnormality         Status                     ---------                                -----------         ------                     CBC Auto Differential[[id number]]        Abnormal            Final result                 Please view results for these tests on the individual orders.   GREEN TOP   LAVENDER TOP   GOLD TOP - SST   GRAY TOP   LIGHT BLUE TOP       Meds Given in ED:   Medications   sodium chloride 0.9 % flush 10 mL (has no administration in time range)   nitroglycerin (NITROSTAT) SL tablet 0.4 mg (0.4 mg Sublingual Given 7/5/24 1352)   nitroglycerin (TRIDIL) 200 mcg/ml infusion (has no administration in time range)   Enoxaparin Sodium (LOVENOX) syringe 60 mg (has no administration in time range)   furosemide (LASIX) injection 20 mg (has no administration in time range)   aspirin chewable tablet 324 mg (324 mg Oral Given 7/5/24 1352)     nitroglycerin, 10-50 mcg/min         Last NIH score:                                                          Dysphagia screening results:  Patient Factors Component (Dysphagia:Stroke or Rule-out)  Best Eye Response: 4-->(E4) spontaneous (07/05/24 1325)  Best Motor Response: 6-->(M6) obeys commands (07/05/24 1325)  Best Verbal Response: 5-->(V5) oriented (07/05/24 1325)  Sushma Coma Scale Score: 15 (07/05/24 1325)     Sushma Coma Scale:  No data recorded     CIWA:        Restraint Type:            Isolation Status:  No active isolations

## 2024-07-05 NOTE — CONSULTS
[facility]  Consultation History and Physical  Ninoska Gloria  2/8/1931      PCP:   Lizette Fitzpatrick PA        Date of  Consultation: 7/5/2024 16:20 EDT     Reason for Consultation: Chest pain:      Cardiology problem list:  CAD  Inferior MI 1996 s/p tpa  stent to RCA and LAD 1996 Dr. Velasquez  stress Echo 2006 - WNL  Echo 8/2012 - normal LV function, no significant valvular abnormalities  8/16/13 MPS: Abnormal large sized severely fixed inferior defect, moderate size moderately fixed lateral defect with no reversibility, EF 45%  Pulmonary hypertension  4/17/18 echo: EF 56%, mild concentric LVH, grade 1 diastolic dysfunction, mild calcification of the aortic valve, mild MR, mild TR, RVSP 79 mmHg, small pericardial effusion  CT chest - 2020 bronchiectasis  A-fib/flutter  [id number] no AC due to falls  Hypertension  Dyslipidemia  DM 2, on insulin  4/19/18 A1c 8.7  CKD  7/23 1.4 Dr Gonzalez  Hypothyroidism  GERD  History of retinal detachment  Frequent falls  Diverticulosis  ACUNA  Cholelithiasis  Surgical history  5 c section     History of Present Illness:  Ninoska Gloria  Is a 89 y.o. female with medical history detailed above.  She was actually here in the ER on Monday with some generalized weakness found to have a UTI for which she is currently on antibiotics.  She states she was having some chest pain at that time.  Troponins on Monday were 73 and 82.  She states starting last night she again had fairly severe central chest pain felt like someone sitting on her chest.  Last night and this morning she has taken a total of 4 sublingual nitroglycerin with improvement but without resolution of the chest pain.  Troponins here today are lower than Monday 67, 63.  EKG shows a chronic inferior infarct but no new ST changes.  There are frequent PVCs.  Patient also states she is having a hard time breathing.  proBNP is 3885.  She does have some bibasilar crackles.  Chest x-ray today actually looks  improved compared with 7/1.  Cardiology was consulted for further recommendations.  She has a history of PCI in 1996.  Last stress test showed a large inferior defect and a moderate lateral defect.  It has been sometime since her last cardiac testing.  She has been taking Imdur 30 mg daily at home for stable angina      Patient Active Problem List    Diagnosis Date Noted    *Chest pain 07/05/2024    Recurrent UTI 08/19/2022    Dementia 08/19/2022    Stage 3b chronic kidney disease 08/19/2022    Chronic diastolic CHF (congestive heart failure) 08/19/2022    GERD without esophagitis 08/19/2022    UTI (urinary tract infection) 08/19/2022    PMB (postmenopausal bleeding) 03/19/2021     Note Last Updated: 3/19/2021     Added automatically from request for surgery [id number]      Cirrhosis of liver without ascites 01/15/2021     Note Last Updated: 1/15/2021     Added automatically from request for surgery [id number]      Coronary artery disease involving native coronary artery of native heart without angina pectoris 05/24/2018    Essential hypertension 05/24/2018    Dyslipidemia 05/24/2018    Type 2 diabetes mellitus without complication, with long-term current use of insulin 05/24/2018    Paroxysmal atrial fibrillation 05/24/2018    Pneumonia of lower lobe due to infectious organism 04/16/2018       No Known Allergies    Social History     Socioeconomic History    Marital status:    Tobacco Use    Smoking status: Never     Passive exposure: Never    Smokeless tobacco: Never   Vaping Use    Vaping status: Never Used   Substance and Sexual Activity    Alcohol use: No    Drug use: No    Sexual activity: Not Currently     Partners: Male     Birth control/protection: Surgical, Post-menopausal       Family History   Problem Relation Age of Onset    COPD Mother     COPD Father     Heart attack Sister     Liver cancer Brother     No Known Problems Daughter     No Known Problems Son     No Known Problems Maternal Grandmother      No Known Problems Maternal Grandfather     No Known Problems Paternal Grandmother     No Known Problems Paternal Grandfather     Colon cancer Neg Hx     Liver disease Neg Hx     Cirrhosis Neg Hx        Current Medications:    Current Facility-Administered Medications:     aluminum-magnesium hydroxide-simethicone (MAALOX MAX) 400-400-40 MG/5ML suspension 30 mL, 30 mL, Oral, Once, Sadie Ley APRN    Enoxaparin Sodium (LOVENOX) syringe 60 mg, 1 mg/kg, Subcutaneous, Q24H, Gustavo Vazquez MD    furosemide (LASIX) injection 20 mg, 20 mg, Intravenous, Once, Gustavo Vazquez MD    nitroglycerin (NITROSTAT) SL tablet 0.4 mg, 0.4 mg, Sublingual, Q5 Min PRN, Shayne Contreras MD, 0.4 mg at 07/05/24 1352    nitroglycerin (TRIDIL) 200 mcg/ml infusion, 10-50 mcg/min, Intravenous, Titrated, Gustavo Vazquez MD, Last Rate: 3 mL/hr at 07/05/24 1613, 10 mcg/min at 07/05/24 1613    sodium chloride 0.9 % flush 10 mL, 10 mL, Intravenous, PRN, Shayne Contreras MD    Current Outpatient Medications:     acetaminophen (TYLENOL) 500 MG tablet, Take 2 tablets by mouth 2 (Two) Times a Day As Needed for Mild Pain., Disp: , Rfl:     aspirin 81 MG EC tablet, Take 1 tablet by mouth Daily., Disp: , Rfl:     cefdinir (OMNICEF) 300 MG capsule, Take 1 capsule by mouth 2 (Two) Times a Day for 7 days., Disp: 14 capsule, Rfl: 0    Continuous Glucose  (FreeStyle Rony 3 Dickey) device, , Disp: , Rfl:     Continuous Glucose Sensor (FreeStyle Rony 3 Sensor) misc, , Disp: , Rfl:     Cranberry 400 MG tablet, Take 1 tablet by mouth Every Night., Disp: , Rfl:     dilTIAZem CD (CARDIZEM CD) 240 MG 24 hr capsule, Take 1 capsule by mouth Daily., Disp: , Rfl:     Flaxseed, Linseed, (Flaxseed Oil) 1000 MG capsule, Take 2 capsules by mouth 2 (Two) Times a Day., Disp: , Rfl:     furosemide (LASIX) 40 MG tablet, Take 1 tablet by mouth Daily., Disp: , Rfl:     glipiZIDE (GLUCOTROL) 10 MG tablet, Take 2 tablets by mouth 2  (Two) Times a Day Before Meals., Disp: , Rfl:     hydrALAZINE (APRESOLINE) 50 MG tablet, Take 1 tablet by mouth 2 (Two) Times a Day., Disp: , Rfl:     insulin detemir (LEVEMIR) 100 UNIT/ML injection, Inject 70 Units under the skin into the appropriate area as directed Daily., Disp: , Rfl:     isosorbide mononitrate (IMDUR) 30 MG 24 hr tablet, Take 1 tablet by mouth Daily., Disp: 30 tablet, Rfl: 11    levothyroxine (SYNTHROID, LEVOTHROID) 88 MCG tablet, Take 1 tablet by mouth Daily., Disp: , Rfl:     Magnesium 500 MG capsule, Take 1 tablet by mouth As Needed., Disp: , Rfl:     metoprolol tartrate (LOPRESSOR) 25 MG tablet, Take 1 tablet by mouth 2 (Two) Times a Day., Disp: , Rfl:     multivitamin with minerals (MULTIVITAMIN ADULTS 50+ PO), Take 1 tablet by mouth Daily., Disp: , Rfl:     nitroglycerin (NITROSTAT) 0.4 MG SL tablet, , Disp: , Rfl:     Omega-3 Fatty Acids (fish oil) 1200 MG capsule capsule, Take 2 capsules by mouth Daily., Disp: , Rfl:     omeprazole (priLOSEC) 20 MG capsule, Take 1 capsule by mouth Daily. Every other day, Disp: , Rfl:     simvastatin (ZOCOR) 10 MG tablet, Take 1 tablet by mouth Every Night., Disp: , Rfl:     triamcinolone (KENALOG) 0.1 % ointment, Apply 1 Application topically to the appropriate area as directed Daily As Needed for Irritation or Rash., Disp: , Rfl:     vitamin B-12 (CYANOCOBALAMIN) 1000 MCG tablet, Take 1 tablet by mouth 4 (Four) Times a Week., Disp: , Rfl:      Review of Systems   Cardiovascular:  Positive for chest pain and dyspnea on exertion.   Respiratory:  Positive for shortness of breath.    Gastrointestinal:  Positive for diarrhea and heartburn.       OBJECTIVE:  Vitals:    07/05/24 1402 07/05/24 1611 07/05/24 1612 07/05/24 1614   BP: 121/100 144/73     BP Location:       Patient Position:       Pulse: 61 65 61 64   Resp:       Temp:       TempSrc:       SpO2:  93% 94% 95%   Weight:       Height:         No intake/output data recorded.  No intake/output data  recorded.  Intake & Output (last 3 days)       None               Physical Exam  Constitutional:       Appearance: Normal appearance.   Cardiovascular:      Rate and Rhythm: Normal rate and regular rhythm.      Heart sounds: Murmur (Soft systolic) heard.   Pulmonary:      Effort: Pulmonary effort is normal.      Breath sounds: Rales (Bibasilar crackles) present.   Musculoskeletal:      Right lower leg: No edema.      Left lower leg: No edema.   Neurological:      General: No focal deficit present.      Mental Status: She is alert.         Diagnostic Data:  Lab Results (last 24 hours)       Procedure Component Value Units Date/Time    High Sensitivity Troponin T 2Hr [[id number]]  (Abnormal) Collected: 07/05/24 1533    Specimen: Blood Updated: 07/05/24 1602     HS Troponin T 63 ng/L      Troponin T Delta -4 ng/L     Narrative:      High Sensitive Troponin T Reference Range:  <14.0 ng/L- Negative Female for AMI  <22.0 ng/L- Negative Male for AMI  >=14 - Abnormal Female indicating possible myocardial injury.  >=22 - Abnormal Male indicating possible myocardial injury.   Clinicians would have to utilize clinical acumen, EKG, Troponin, and serial changes to determine if it is an Acute Myocardial Infarction or myocardial injury due to an underlying chronic condition.         COVID PRE-OP / PRE-PROCEDURE SCREENING ORDER (NO ISOLATION) - Swab, Nasopharynx [[id number]]  (Normal) Collected: 07/05/24 1323    Specimen: Swab from Nasopharynx Updated: 07/05/24 1424    Narrative:      The following orders were created for panel order COVID PRE-OP / PRE-PROCEDURE SCREENING ORDER (NO ISOLATION) - Swab, Nasopharynx.  Procedure                               Abnormality         Status                     ---------                               -----------         ------                     Respiratory Panel PCR w/...[[id number]]  Normal              Final result                 Please view results for these tests on the individual orders.     Respiratory Panel PCR w/COVID-19(SARS-CoV-2) ADAM/NATY/SHASTA/PAD/COR/KARLA In-House, NP Swab in UTM/VTM, 2 HR TAT - Swab, Nasopharynx [[id number]]  (Normal) Collected: 07/05/24 1323    Specimen: Swab from Nasopharynx Updated: 07/05/24 1424     ADENOVIRUS, PCR Not Detected     Coronavirus 229E Not Detected     Coronavirus HKU1 Not Detected     Coronavirus NL63 Not Detected     Coronavirus OC43 Not Detected     COVID19 Not Detected     Human Metapneumovirus Not Detected     Human Rhinovirus/Enterovirus Not Detected     Influenza A PCR Not Detected     Influenza B PCR Not Detected     Parainfluenza Virus 1 Not Detected     Parainfluenza Virus 2 Not Detected     Parainfluenza Virus 3 Not Detected     Parainfluenza Virus 4 Not Detected     RSV, PCR Not Detected     Bordetella pertussis pcr Not Detected     Bordetella parapertussis PCR Not Detected     Chlamydophila pneumoniae PCR Not Detected     Mycoplasma pneumo by PCR Not Detected    Narrative:      In the setting of a positive respiratory panel with a viral infection PLUS a negative procalcitonin without other underlying concern for bacterial infection, consider observing off antibiotics or discontinuation of antibiotics and continue supportive care. If the respiratory panel is positive for atypical bacterial infection (Bordetella pertussis, Chlamydophila pneumoniae, or Mycoplasma pneumoniae), consider antibiotic de-escalation to target atypical bacterial infection.    High Sensitivity Troponin T [[id number]]  (Abnormal) Collected: 07/05/24 1323    Specimen: Blood Updated: 07/05/24 1353     HS Troponin T 67 ng/L     Narrative:      High Sensitive Troponin T Reference Range:  <14.0 ng/L- Negative Female for AMI  <22.0 ng/L- Negative Male for AMI  >=14 - Abnormal Female indicating possible myocardial injury.  >=22 - Abnormal Male indicating possible myocardial injury.   Clinicians would have to utilize clinical acumen, EKG, Troponin, and serial changes to determine if it is  an Acute Myocardial Infarction or myocardial injury due to an underlying chronic condition.         Comprehensive Metabolic Panel [[id number]]  (Abnormal) Collected: 07/05/24 1323    Specimen: Blood Updated: 07/05/24 1352     Glucose 84 mg/dL      BUN 29 mg/dL      Creatinine 1.31 mg/dL      Sodium 142 mmol/L      Potassium 4.5 mmol/L      Chloride 104 mmol/L      CO2 29.0 mmol/L      Calcium 10.1 mg/dL      Total Protein 7.5 g/dL      Albumin 3.8 g/dL      ALT (SGPT) 24 U/L      AST (SGOT) 37 U/L      Alkaline Phosphatase 124 U/L      Total Bilirubin 0.2 mg/dL      Globulin 3.7 gm/dL      Comment: Calculated Result        A/G Ratio 1.0 g/dL      BUN/Creatinine Ratio 22.1     Anion Gap 9.0 mmol/L      eGFR 38.1 mL/min/1.73     Narrative:      GFR Normal >60  Chronic Kidney Disease <60  Kidney Failure <15    The GFR formula is only valid for adults with stable renal function between ages 18 and 70.    Lipase [[id number]]  (Normal) Collected: 07/05/24 1323    Specimen: Blood Updated: 07/05/24 1352     Lipase 36 U/L     BNP [[id number]]  (Abnormal) Collected: 07/05/24 1323    Specimen: Blood Updated: 07/05/24 1352     proBNP 3,885.0 pg/mL     Narrative:      This assay is used as an aid in the diagnosis of individuals suspected of having heart failure. It can be used as an aid in the diagnosis of acute decompensated heart failure (ADHF) in patients presenting with signs and symptoms of ADHF to the emergency department (ED). In addition, NT-proBNP of <300 pg/mL indicates ADHF is not likely.    Age Range Result Interpretation  NT-proBNP Concentration (pg/mL:      <50             Positive            >450                   Gray                 300-450                    Negative             <300    50-75           Positive            >900                  Gray                300-900                  Negative            <300      >75             Positive            >1800                  Gray                300-1800                   Negative            <300    CBC & Differential [[id number]]  (Abnormal) Collected: 07/05/24 1323    Specimen: Blood Updated: 07/05/24 1333    Narrative:      The following orders were created for panel order CBC & Differential.  Procedure                               Abnormality         Status                     ---------                               -----------         ------                     CBC Auto Differential[[id number]]        Abnormal            Final result                 Please view results for these tests on the individual orders.    CBC Auto Differential [[id number]]  (Abnormal) Collected: 07/05/24 1323    Specimen: Blood Updated: 07/05/24 1333     WBC 8.50 10*3/mm3      RBC 4.48 10*6/mm3      Hemoglobin 11.2 g/dL      Hematocrit 37.1 %      MCV 82.8 fL      MCH 25.0 pg      MCHC 30.2 g/dL      RDW 16.0 %      RDW-SD 48.5 fl      MPV 10.1 fL      Platelets 249 10*3/mm3      Neutrophil % 72.3 %      Lymphocyte % 16.4 %      Monocyte % 9.2 %      Eosinophil % 1.5 %      Basophil % 0.2 %      Immature Grans % 0.4 %      Neutrophils, Absolute 6.15 10*3/mm3      Lymphocytes, Absolute 1.39 10*3/mm3      Monocytes, Absolute 0.78 10*3/mm3      Eosinophils, Absolute 0.13 10*3/mm3      Basophils, Absolute 0.02 10*3/mm3      Immature Grans, Absolute 0.03 10*3/mm3      nRBC 0.0 /100 WBC     Printer Draw [[id number]] Collected: 07/05/24 1323    Specimen: Blood Updated: 07/05/24 1330    Narrative:      The following orders were created for panel order Printer Draw.  Procedure                               Abnormality         Status                     ---------                               -----------         ------                     Green Top (Gel)[[id number]]                                  Final result               Lavender Top[[id number]]                                     Final result               Gold Top - SST[[id number]]                                   Final result               Arana  Top[[id number]]                                         Final result               Light Blue Top[[id number]]                                   Final result                 Please view results for these tests on the individual orders.    Green Top (Gel) [[id number]] Collected: 07/05/24 1323    Specimen: Blood Updated: 07/05/24 1330     Extra Tube Hold for add-ons.     Comment: Auto resulted.       Lavender Top [[id number]] Collected: 07/05/24 1323    Specimen: Blood Updated: 07/05/24 1330     Extra Tube hold for add-on     Comment: Auto resulted       Gold Top - SST [[id number]] Collected: 07/05/24 1323    Specimen: Blood Updated: 07/05/24 1330     Extra Tube Hold for add-ons.     Comment: Auto resulted.       Gray Top [[id number]] Collected: 07/05/24 1323    Specimen: Blood Updated: 07/05/24 1330     Extra Tube Hold for add-ons.     Comment: Auto resulted.       Light Blue Top [[id number]] Collected: 07/05/24 1323    Specimen: Blood Updated: 07/05/24 1330     Extra Tube Hold for add-ons.     Comment: Auto resulted             ECG/EMG Results (last 24 hours)       Procedure Component Value Units Date/Time    ECG 12 Lead ED Triage Standing Order; Chest Pain [[id number]] Collected: 07/05/24 1256     Updated: 07/05/24 1440     QT Interval 428 ms      QTC Interval 462 ms     Narrative:      Test Reason : ED Triage Standing Order~  Blood Pressure :   */*   mmHG  Vent. Rate :  70 BPM     Atrial Rate :  70 BPM     P-R Int : 152 ms          QRS Dur :  90 ms      QT Int : 428 ms       P-R-T Axes :  35  25  40 degrees     QTc Int : 462 ms    Sinus rhythm with occasional premature ventricular complexes  Left ventricular hypertrophy with repolarization abnormality  Possible Inferior infarct , age undetermined  Abnormal ECG    Confirmed by MADAY GUTIERREZ MD ([id number]) on 7/5/2024 2:40:28 PM    Referred By: EDMD           Confirmed By: MADAY GUTIERREZ MD    ECG 12 Lead ED Triage Standing Order; Chest Pain [[id number]] Collected: 07/05/24 1530      Updated: 07/05/24 1532     QT Interval 440 ms      QTC Interval 461 ms     Narrative:      Test Reason : ED Triage Standing Order~  Blood Pressure :   */*   mmHG  Vent. Rate :  66 BPM     Atrial Rate :  66 BPM     P-R Int : 142 ms          QRS Dur :  90 ms      QT Int : 440 ms       P-R-T Axes :  56  18  62 degrees     QTc Int : 461 ms    Sinus rhythm with frequent premature ventricular complexes  Minimal voltage criteria for LVH, may be normal variant  Inferior infarct (cited on or before 01-JUL-2024)  Abnormal ECG  When compared with ECG of 05-JUL-2024 12:56,  No significant change was found    Referred By: ED MD           Confirmed By:               Chest pain    Coronary artery disease involving native coronary artery of native heart without angina pectoris    Essential hypertension    Dyslipidemia    Type 2 diabetes mellitus without complication, with long-term current use of insulin    Paroxysmal atrial fibrillation    Cirrhosis of liver without ascites    Dementia    Stage 3b chronic kidney disease    Chronic diastolic CHF (congestive heart failure)    GERD without esophagitis      Assessment/Plan:    Chest pain/unstable angina  Troponins are elevated but not more so than recent baseline  EKG with chronic inferior infarct and frequent PVCs  With patient's advanced age we will try to treat medically.  Start nitroglycerin drip now for treatment of chest pain.  Continue aspirin.  Change statin to atorvastatin 40 mg  Will utilize therapeutic Lovenox for anticoagulation, plan for 48 hours.  Resume metoprolol  Obtain echocardiogram to reevaluate LV systolic function and wall motion.  Would defer cardiac catheterization unless patient has continued refractory chest pain.  Hypertension  Starting nitroglycerin drip  Will continue her home metoprolol.  Holding diltiazem and hydralazine at this time  Urinary tract infection  Is supposed to complete a cefdinir course on Tuesday.  CKD  Appears near  baseline  Diabetes  Appreciate hospitalist assistance.  She was on the home insulin regimen.    She will be admitted to telemetry.    We will follow.        Gustavo Vazquez MD [facility]

## 2024-07-05 NOTE — H&P
"    [facility] Medicine Services  HISTORY AND PHYSICAL    Patient Name: Ninoska Gloria  : 1931  MRN: [id number]  Primary Care Physician: Lizette Ftizpatrick PA  Date of admission: 2024    Subjective   Subjective     Chief Complaint:  Chest Pain     HPI:  Ninoska Gloria is a 89 y.o. female with past medical history significant for CAD, HTN, HLD, A-fib (not on anticoagulations,?  CHF, chronic O2 2 LNC, memory loss/mild dementia, DM type II, CKD 3, frequent UTIs, and ACUNA cirrhosis.  Patient is followed by Dr. Peoples with [facility] cardiology.  She awoke approx 1:30 AM with mid to lower chest pain relieved by nitroglycerin.  Awoke again at 5 AM with recurrent chest pain partially relieved by nitroglycerin.  Persistent chest pain and took 1 additional dose of nitroglycerin which really did not help.  Unable to accurately numerically rate her chest pain but states it was \"really bad\" early this morning and now it is mild.  Presents to [facility] ER for eval.  Of note patient also was seen here 2024 in ER and diagnosed with UTI.    HS troponin 67, BNP 3885, respiratory viral panel negative.  CXR no acute finding.  BP mildly elevated but improved at rest.  Seen by cardiology in ER.  They have started patient on Lasix x 1, nitroglycerin drip, Lovenox every 12 hrs SQ, and ordered echo.  On my exam patient complaining her pain is mostly in her stomach and she feels it is burning.  Agreeable to try GI cocktail.  Continue medications per cardiology orders.  Admit to hospital medicine service and cardiology to follow.      Review of Systems   Constitutional:  Positive for activity change and fatigue. Negative for chills, diaphoresis and fever.   HENT:  Negative for congestion, trouble swallowing and voice change.    Eyes: Negative.    Respiratory:  Positive for chest tightness. Negative for shortness of breath and wheezing.    Cardiovascular:  Positive for chest pain. Negative for palpitations and " "leg swelling.   Gastrointestinal: Negative.    Endocrine: Negative.    Genitourinary:  Negative for difficulty urinating, dysuria, flank pain, hematuria and urgency.   Musculoskeletal:  Positive for arthralgias and gait problem.   Skin:  Positive for pallor.   Allergic/Immunologic: Negative.    Neurological:  Negative for dizziness, speech difficulty, light-headedness and headaches.   Hematological: Negative.    Psychiatric/Behavioral: Negative.          Personal History     Past Medical History:   Diagnosis Date    Abnormal ECG     Anemia     Anxiety and depression     Arrhythmia     Atrial fibrillation     Daughter reported \"in the past\"    Back pain     Cancer     Daughter reported skin cancer removed from nose in the past    CHF (congestive heart failure)     Cirrhosis     CKD (chronic kidney disease), stage III     \"Renal failure stage 3\"    Constipation     Reported intermittent episodes    COPD (chronic obstructive pulmonary disease)     Coronary artery disease     Patient daughter reported 2 coronary stents    COVID-19 09/2021    Diabetes mellitus 1990's    Diarrhea     Reported intermittent episodes    Disease of thyroid gland     Elevated cholesterol     Full dentures     Patient's daughter advised no adhesives DOS    GERD (gastroesophageal reflux disease)     Headache     Heart murmur     High triglycerides     History of blood transfusion     Patient's daughter reported unsure but this was previously noted in patients record    History of bronchitis     History of pneumonia     [place] (hard of hearing)     Patient's daughter reported severely [place] and that patient has hearing aids but doesn't wear them    Hyperlipidemia     Hypertension 1948    Iron deficiency 1996    Myocardial infarction 1996    Patient's daughter reported placement of 2 stents    Osteoarthritis     Ovarian cyst     Pneumonia     Retinal detachment 2009    Shingles     about to finish treatment up     SOB (shortness of breath)     Vision " problems              Past Surgical History:   Procedure Laterality Date    CARDIAC CATHETERIZATION      Patient's daughter reported placement of 2 stents    CATARACT EXTRACTION Bilateral ,      SECTION      x 5: 1948, 1950, 195, 195,     COLONOSCOPY      CORONARY STENT PLACEMENT      ENDOSCOPY N/A 2021    Procedure: ESOPHAGOGASTRODUODENOSCOPY WITH BIOPSY;  Surgeon: Marlon Dixon MD;  Location: [facility] ENDOSCOPY;  Service: Gastroenterology;  Laterality: N/A;    MOUTH SURGERY      full mouth extraction    SKIN CANCER EXCISION      TUBAL ABDOMINAL LIGATION         Family History:  family history includes COPD in her father and mother; Heart attack in her sister; Liver cancer in her brother; No Known Problems in her daughter, maternal grandfather, maternal grandmother, paternal grandfather, paternal grandmother, and son.     Social History:  reports that she has never smoked. She has never been exposed to tobacco smoke. She has never used smokeless tobacco. She reports that she does not drink alcohol and does not use drugs.  Social History     Social History Narrative    .  Lives in her home and her adult daughters rotate staying with her around-the-clock.  Up with a walker at baseline and uses 2 LNC oxygen.       Medications:  Cranberry, Flaxseed Oil, FreeStyle Rony 3 [place], FreeStyle Rony 3 Sensor, Magnesium, acetaminophen, aspirin, cefdinir, dilTIAZem CD, fish oil, furosemide, glipizide, hydrALAZINE, insulin detemir, isosorbide mononitrate, levothyroxine, metoprolol tartrate, multivitamin with minerals, nitroglycerin, omeprazole, simvastatin, triamcinolone, and vitamin B-12    No Known Allergies    Objective   Objective     Vital Signs:   Temp:  [97.7 °F (36.5 °C)] 97.7 °F (36.5 °C)  Heart Rate:  [47-71] 71  Resp:  [18] 18  BP: (121-177)/() 149/72    Physical Exam   Constitutional: Awake, alert.  Resting back in no acute distress.  Chronically ill,  frail and debilitated elderly female.  Daughter at bedside.  Eyes: PERRLA, sclerae anicteric, no conjunctival injection  HENT: NCAT, mucous membranes moist  Neck: Supple, no thyromegaly, no lymphadenopathy, trachea midline  Respiratory: clear upper lobes, scattered fine crackles to bases.  No wheezes.  nonlabored respirations on room air with sats 95%  Cardiovascular: RRR, 2/6 systolic murmur, rubs, or gallops, palpable pedal pulses bilaterally  Gastrointestinal: Positive bowel sounds, soft, nontender, nondistended  Musculoskeletal: No bilateral ankle edema, no clubbing or cyanosis to extremities. JC spont   Psychiatric: Appropriate affect, cooperative  Neurologic: Oriented x 3 with obvious short term memory deficits, strength symmetric in all extremities but generally weak, Cranial Nerves grossly intact to confrontation, speech clear. Follows commands   Skin: No rashes      Result Review:  I have personally reviewed the results from the time of this admission to 7/5/2024 16:32 EDT and agree with these findings:  [x]  Laboratory list / accordion  []  Microbiology  [x]  Radiology  [x]  EKG/Telemetry   [x]  Cardiology/Vascular   []  Pathology  [x]  Old records  []  Other  Most notable findings include:     LAB RESULTS:      Lab 07/05/24  1323 07/01/24 1954 07/01/24  1517   WBC 8.50  --  8.15   HEMOGLOBIN 11.2*  --  10.3*   HEMATOCRIT 37.1  --  35.4   PLATELETS 249  --  235   NEUTROS ABS 6.15  --  5.91   IMMATURE GRANS (ABS) 0.03  --  0.02   LYMPHS ABS 1.39  --  1.38   MONOS ABS 0.78  --  0.78   EOS ABS 0.13  --  0.04   MCV 82.8  --  83.3   PROCALCITONIN  --   --  0.09   LACTATE  --  1.3  --          Lab 07/05/24  1323 07/01/24  1517   SODIUM 142 142   POTASSIUM 4.5 4.3   CHLORIDE 104 106   CO2 29.0 25.0   ANION GAP 9.0 11.0   BUN 29* 38*   CREATININE 1.31* 1.55*   EGFR 38.1* 31.1*   GLUCOSE 84 152*   CALCIUM 10.1* 9.3   MAGNESIUM  --  1.8   PHOSPHORUS  --  3.4   TSH  --  0.990         Lab 07/05/24  1323  07/01/24  1517   TOTAL PROTEIN 7.5 7.0   ALBUMIN 3.8 3.5   GLOBULIN 3.7 3.5   ALT (SGPT) 24 29   AST (SGOT) 37* 49*   BILIRUBIN 0.2 <0.2   ALK PHOS 124* 129*   LIPASE 36 38         Lab 07/05/24  1533 07/05/24  1323 07/01/24  1751 07/01/24  1517   PROBNP  --  3,885.0*  --  1,648.0   HSTROP T 63* 67* 82* 73*                 Brief Urine Lab Results  (Last result in the past 365 days)        Color   Clarity   Blood   Leuk Est   Nitrite   Protein   CREAT   Urine HCG        07/01/24 1629 Yellow   Clear   Negative   Small (1+)   Positive   100 mg/dL (2+)                 Microbiology Results (last 10 days)       Procedure Component Value - Date/Time    COVID PRE-OP / PRE-PROCEDURE SCREENING ORDER (NO ISOLATION) - Swab, Nasopharynx [[id number]]  (Normal) Collected: 07/05/24 1323    Lab Status: Final result Specimen: Swab from Nasopharynx Updated: 07/05/24 1424    Narrative:      The following orders were created for panel order COVID PRE-OP / PRE-PROCEDURE SCREENING ORDER (NO ISOLATION) - Swab, Nasopharynx.  Procedure                               Abnormality         Status                     ---------                               -----------         ------                     Respiratory Panel PCR w/...[[id number]]  Normal              Final result                 Please view results for these tests on the individual orders.    Respiratory Panel PCR w/COVID-19(SARS-CoV-2) ADAM/NATY/SHASTA/PAD/COR/KARLA In-House, NP Swab in UTM/VTM, 2 HR TAT - Swab, Nasopharynx [[id number]]  (Normal) Collected: 07/05/24 1323    Lab Status: Final result Specimen: Swab from Nasopharynx Updated: 07/05/24 1424     ADENOVIRUS, PCR Not Detected     Coronavirus 229E Not Detected     Coronavirus HKU1 Not Detected     Coronavirus NL63 Not Detected     Coronavirus OC43 Not Detected     COVID19 Not Detected     Human Metapneumovirus Not Detected     Human Rhinovirus/Enterovirus Not Detected     Influenza A PCR Not Detected     Influenza B PCR Not Detected      Parainfluenza Virus 1 Not Detected     Parainfluenza Virus 2 Not Detected     Parainfluenza Virus 3 Not Detected     Parainfluenza Virus 4 Not Detected     RSV, PCR Not Detected     Bordetella pertussis pcr Not Detected     Bordetella parapertussis PCR Not Detected     Chlamydophila pneumoniae PCR Not Detected     Mycoplasma pneumo by PCR Not Detected    Narrative:      In the setting of a positive respiratory panel with a viral infection PLUS a negative procalcitonin without other underlying concern for bacterial infection, consider observing off antibiotics or discontinuation of antibiotics and continue supportive care. If the respiratory panel is positive for atypical bacterial infection (Bordetella pertussis, Chlamydophila pneumoniae, or Mycoplasma pneumoniae), consider antibiotic de-escalation to target atypical bacterial infection.    Blood Culture - Blood, Arm, Right [[id number]]  (Normal) Collected: 07/01/24 1954    Lab Status: Preliminary result Specimen: Blood from Arm, Right Updated: 07/04/24 2030     Blood Culture No growth at 3 days    Blood Culture - Blood, Arm, Left [[id number]]  (Normal) Collected: 07/01/24 1954    Lab Status: Preliminary result Specimen: Blood from Arm, Left Updated: 07/04/24 2030     Blood Culture No growth at 3 days    Urine Culture - Urine, Urine, Clean Catch [[id number]]  (Abnormal)  (Susceptibility) Collected: 07/01/24 1629    Lab Status: Final result Specimen: Urine, Clean Catch Updated: 07/04/24 1206     Urine Culture 50,000 CFU/mL Escherichia coli    Narrative:      Colonization of the urinary tract without infection is common. Treatment is discouraged unless the patient is symptomatic, pregnant, or undergoing an invasive urologic procedure.    Susceptibility        Escherichia coli      MAYANK      Amoxicillin + Clavulanate Susceptible      Ampicillin Susceptible      Ampicillin + Sulbactam Susceptible      Cefazolin Susceptible      Cefepime Susceptible      Ceftazidime  Susceptible      Ceftriaxone Susceptible      Gentamicin Susceptible      Levofloxacin Resistant      Nitrofurantoin Susceptible      Piperacillin + Tazobactam Susceptible      Trimethoprim + Sulfamethoxazole Susceptible                                   XR Chest 1 View    Result Date: 7/5/2024  XR CHEST 1 VW Date of Exam: 7/5/2024 1:16 PM EDT Indication: Chest Pain Triage Protocol Comparison: 7/1/2024 Findings: Heart and pulmonary vessels appear within normal limits. Previously seen mild pulmonary edema has resolved. No acute infiltrates or effusions are identified.     Impression: Impression: No acute process. Electronically Signed: Elena Albrecht MD  7/5/2024 1:29 PM EDT  Workstation ID: [id number]     Results for orders placed during the hospital encounter of 04/16/18    Adult Transthoracic Echo Complete W/ Cont if Necessary Per Protocol    Interpretation Summary  · Left ventricular wall thickness is consistent with mild concentric hypertrophy.  · Left ventricular diastolic dysfunction (grade I a) consistent with impaired relaxation.  · There is mild calcification of the aortic valve.  · Mild mitral valve regurgitation is present  · Mild tricuspid valve regurgitation is present.  · Calculated right ventricular systolic pressure from tricuspid regurgitation is 79 mmHg.  · There is a small (<1cm) pericardial effusion.  · Calculated EF = 56%. Normal left ventricular cavity size noted. All left ventricular wall segments contract normally. asynchronous septal motion with mild hypokinesis of inferior wall Left ventricular wall thickness is consistent with mild concentric hypertrophy. Left ventricular diastolic dysfunction is noted (grade I a w/high LAP) consistent with impaired relaxation.      Assessment & Plan   Assessment & Plan       Chest pain    Coronary artery disease involving native coronary artery of native heart without angina pectoris    Essential hypertension    Dyslipidemia    Type 2 diabetes mellitus  "without complication, with long-term current use of insulin    Paroxysmal atrial fibrillation    Cirrhosis of liver without ascites    Dementia    Stage 3b chronic kidney disease    Chronic diastolic CHF (congestive heart failure)    GERD without esophagitis    UTI (urinary tract infection)      Ninoska Gloria is a 89 y.o. female with past medical history significant for CAD, HTN, HLD, A-fib (not on anticoagulations,?  CHF, chronic O2 2 LNC, memory loss/mild dementia, DM type II, CKD 3, frequent UTIs, and ACUNA cirrhosis.  Patient is followed by Dr. Peoples with [facility] cardiology.  She awoke approx 1:30 AM with mid to lower chest pain relieved by nitroglycerin.  Awoke again at 5 AM with recurrent chest pain partially relieved by nitroglycerin.  Persistent chest pain and took 1 additional dose of nitroglycerin which really did not help.  Unable to accurately numerically rate her chest pain but states it was \"really bad\" early this morning and now it is mild.  Presents to [facility] ER for eval.  Of note patient also was seen here 7/1/2024 in ER and diagnosed with UTI.    HS troponin 67, BNP 3885, respiratory viral panel negative.  CXR no acute finding.  BP mildly elevated but improved at rest.  Seen by cardiology in ER.  They have started patient on Lasix x 1, nitroglycerin drip, Lovenox every 12 hrs SQ, and ordered echo.  On my exam patient complaining her pain is mostly in her stomach and she feels it is burning.  Agreeable to try GI cocktail.  Continue medications per cardiology orders.  Admit to hospital medicine service and cardiology to follow.    Assessment/plan:    Chest pain  Hx CAD/HTN/HLD/Afib (no a/c)  ? CHF hx, BNP 3885  Chronic 2LNC use (data deficit)  -- Patient complaining of stomach and epigastric burning pain that began at 1:30 AM today.  Denies chest pain currently.  Reports pain all epigastric and stomach.  Hx reflux.  Will give one-time GI cocktail  -- Already seen by cards in ER.  Started on " nitroglycerin drip, Lovenox every 12 and Lasix x 1.  Continue per their orders.  -- Continue home oxygen  -- Echo pending  -- AM labs  -- Cardiology to follow  -- Will make n.p.o. after midnight tonight in the event cardiology chooses to do procedure.    UTI (dxd here 7/1/24)  Hx freq UTIs  --continue abx prescribed on 7/1 to complete course     DM type II   --last A1c on file from 8/2022= 6.2)  --pt takes 70 units basal insulin daily only.  Glucose currently 84.  -- Continue MD SSI and low-dose basal every morning only for now.  Further as needed.  --Check a.m. A1c to help guide possible need for dose reduction    CKD III  --Cr 1.31, appears near baseline     GERD  -- PPI    Hypothyroidism  -- Daughter denies hypothyroidism however patient takes 88 mcg of levothyroxine daily.  -- Fills every 3 months per chart review and last filled in April.  -- Check a.m. TSH, continue current thyroid dose for now    Mild memory loss/dementia  --(data deficit)  --Da reports never officially dxd as dementia.  Currently ox3 with mild STM deficits     ACUNA  --labs generally stable to her baseline     Chronic weakness/fatigue  Debility   --Lives in her home and her 2 da's take turns staying with her  --consult CM for dc planning   --PT/OT consults,  up with assist       DVT prophylaxis:  sequentials, lovenox per cards    CODE STATUS:    Level Of Support Discussed With: Patient; Next of Kin (If No Surrogate)  Code Status (Patient has no pulse and is not breathing): CPR (Attempt to Resuscitate)  Medical Interventions (Patient has pulse or is breathing): Full Support      Expected Discharge  Expected Discharge Date: 7/7/2024; Expected Discharge Time:      Signature: Electronically signed by AKBAR Correa, 07/05/24, 4:26 PM EDT

## 2024-07-05 NOTE — Clinical Note
Level of Care: Telemetry [5]   Diagnosis: Chest pain [[id number]]   Admitting Physician: QUIN PHELAN [[id number]]   Attending Physician: QUIN PHELAN [[id number]]

## 2024-07-05 NOTE — ED PROVIDER NOTES
" EMERGENCY DEPARTMENT ENCOUNTER    Pt Name: Ninoska Gloria  MRN: [id number]  Pt :   1931  Room Number:    Date of encounter:  2024  PCP: Lizette Fitzpatrick PA  ED Provider: Shayne Contreras MD    Historian: daughter and patient      HPI:  Chief Complaint: Chest discomfort        Context: Ninoska Gloria is a 89 y.o. female who presents to the ED c/o chest discomfort starting last evening.  The patient took a nitroglycerin around 11 PM and this resolved her discomfort.  Today she had recurrent discomfort and has now taken 3 sublingual nitroglycerin with the last one being at roughly 10:30 AM.  She did get partial relief of her chest heaviness/discomfort.  She has not done any heavy lifting.  He was recently evaluated in our emergency department secondary to UTI.  She is being treated for this at home.  The patient utilizes oxygen at night only.  The patient follows with Dr. Peoples, cardiology.      PAST MEDICAL HISTORY  Past Medical History:   Diagnosis Date    Abnormal ECG     Anemia     Anxiety and depression     Arrhythmia     Atrial fibrillation     Daughter reported \"in the past\"    Back pain     Cancer     Daughter reported skin cancer removed from nose in the past    CHF (congestive heart failure)     Cirrhosis     CKD (chronic kidney disease), stage III     \"Renal failure stage 3\"    Constipation     Reported intermittent episodes    COPD (chronic obstructive pulmonary disease)     Coronary artery disease     Patient daughter reported 2 coronary stents    COVID-19 2021    Diabetes mellitus 's    Diarrhea     Reported intermittent episodes    Disease of thyroid gland     Elevated cholesterol     Full dentures     Patient's daughter advised no adhesives DOS    GERD (gastroesophageal reflux disease)     Headache     Heart murmur     High triglycerides     History of blood transfusion     Patient's daughter reported unsure but this was previously noted in patients record    History of " bronchitis     History of pneumonia     [place] (hard of hearing)     Patient's daughter reported severely [place] and that patient has hearing aids but doesn't wear them    Hyperlipidemia     Hypertension     Iron deficiency     Myocardial infarction     Patient's daughter reported placement of 2 stents    Osteoarthritis     Ovarian cyst     Pneumonia     Retinal detachment     Shingles     about to finish treatment up     SOB (shortness of breath)     Vision problems          PAST SURGICAL HISTORY  Past Surgical History:   Procedure Laterality Date    CARDIAC CATHETERIZATION      Patient's daughter reported placement of 2 stents    CATARACT EXTRACTION Bilateral 2009     SECTION      x 5: 1948, 1950, , ,     COLONOSCOPY      CORONARY STENT PLACEMENT      ENDOSCOPY N/A 2021    Procedure: ESOPHAGOGASTRODUODENOSCOPY WITH BIOPSY;  Surgeon: Marlon Dixon MD;  Location: [facility] ENDOSCOPY;  Service: Gastroenterology;  Laterality: N/A;    MOUTH SURGERY      full mouth extraction    SKIN CANCER EXCISION      TUBAL ABDOMINAL LIGATION           FAMILY HISTORY  Family History   Problem Relation Age of Onset    COPD Mother     COPD Father     Heart attack Sister     Liver cancer Brother     No Known Problems Maternal Grandmother     No Known Problems Maternal Grandfather     No Known Problems Paternal Grandmother     No Known Problems Paternal Grandfather     Colon cancer Neg Hx     Liver disease Neg Hx     Cirrhosis Neg Hx          SOCIAL HISTORY  Social History     Socioeconomic History    Marital status:    Tobacco Use    Smoking status: Never     Passive exposure: Never    Smokeless tobacco: Never   Vaping Use    Vaping status: Never Used   Substance and Sexual Activity    Alcohol use: No    Drug use: No    Sexual activity: Not Currently     Partners: Male     Birth control/protection: Surgical, Post-menopausal         ALLERGIES  Patient has no known  allergies.        REVIEW OF SYSTEMS  Review of Systems       All systems reviewed and negative except for those discussed in HPI.       PHYSICAL EXAM    I have reviewed the triage vital signs and nursing notes.    ED Triage Vitals [07/05/24 1245]   Temp Heart Rate Resp BP SpO2   97.7 °F (36.5 °C) (!) 47 18 159/76 94 %      Temp src Heart Rate Source Patient Position BP Location FiO2 (%)   Oral Monitor Sitting Right arm --       Physical Exam  GENERAL:   Appears in no acute distress.  Pleasant, nontoxic, hard of hearing  HENT: Nares patent.  EYES: No scleral icterus.  CV: Regular underlying rhythm with extrasystoles, regular rate.  No murmurs gallops rubs.  RESPIRATORY: Normal effort.  No audible wheezes, rales or rhonchi.  ABDOMEN: Soft, nontender  MUSCULOSKELETAL: No deformities.  Very mild tenderness to palpation of the chest wall  NEURO: Alert, moves all extremities, follows commands.  SKIN: Warm, dry, no rash visualized.      LAB RESULTS  Recent Results (from the past 24 hour(s))   ECG 12 Lead ED Triage Standing Order; Chest Pain    Collection Time: 07/05/24 12:56 PM   Result Value Ref Range    QT Interval 428 ms    QTC Interval 462 ms   High Sensitivity Troponin T    Collection Time: 07/05/24  1:23 PM    Specimen: Blood   Result Value Ref Range    HS Troponin T 67 (C) <14 ng/L   Comprehensive Metabolic Panel    Collection Time: 07/05/24  1:23 PM    Specimen: Blood   Result Value Ref Range    Glucose 84 65 - 99 mg/dL    BUN 29 (H) 8 - 23 mg/dL    Creatinine 1.31 (H) 0.57 - 1.00 mg/dL    Sodium 142 136 - 145 mmol/L    Potassium 4.5 3.5 - 5.2 mmol/L    Chloride 104 98 - 107 mmol/L    CO2 29.0 22.0 - 29.0 mmol/L    Calcium 10.1 (H) 8.2 - 9.6 mg/dL    Total Protein 7.5 6.0 - 8.5 g/dL    Albumin 3.8 3.5 - 5.2 g/dL    ALT (SGPT) 24 1 - 33 U/L    AST (SGOT) 37 (H) 1 - 32 U/L    Alkaline Phosphatase 124 (H) 39 - 117 U/L    Total Bilirubin 0.2 0.0 - 1.2 mg/dL    Globulin 3.7 gm/dL    A/G Ratio 1.0 g/dL    BUN/Creatinine  Ratio 22.1 7.0 - 25.0    Anion Gap 9.0 5.0 - 15.0 mmol/L    eGFR 38.1 (L) >60.0 mL/min/1.73   Lipase    Collection Time: 07/05/24  1:23 PM    Specimen: Blood   Result Value Ref Range    Lipase 36 13 - 60 U/L   BNP    Collection Time: 07/05/24  1:23 PM    Specimen: Blood   Result Value Ref Range    proBNP 3,885.0 (H) 0.0 - 1,800.0 pg/mL   Green Top (Gel)    Collection Time: 07/05/24  1:23 PM   Result Value Ref Range    Extra Tube Hold for add-ons.    Lavender Top    Collection Time: 07/05/24  1:23 PM   Result Value Ref Range    Extra Tube hold for add-on    Gold Top - SST    Collection Time: 07/05/24  1:23 PM   Result Value Ref Range    Extra Tube Hold for add-ons.    Gray Top    Collection Time: 07/05/24  1:23 PM   Result Value Ref Range    Extra Tube Hold for add-ons.    Light Blue Top    Collection Time: 07/05/24  1:23 PM   Result Value Ref Range    Extra Tube Hold for add-ons.    CBC Auto Differential    Collection Time: 07/05/24  1:23 PM    Specimen: Blood   Result Value Ref Range    WBC 8.50 3.40 - 10.80 10*3/mm3    RBC 4.48 3.77 - 5.28 10*6/mm3    Hemoglobin 11.2 (L) 12.0 - 15.9 g/dL    Hematocrit 37.1 34.0 - 46.6 %    MCV 82.8 79.0 - 97.0 fL    MCH 25.0 (L) 26.6 - 33.0 pg    MCHC 30.2 (L) 31.5 - 35.7 g/dL    RDW 16.0 (H) 12.3 - 15.4 %    RDW-SD 48.5 37.0 - 54.0 fl    MPV 10.1 6.0 - 12.0 fL    Platelets 249 140 - 450 10*3/mm3    Neutrophil % 72.3 42.7 - 76.0 %    Lymphocyte % 16.4 (L) 19.6 - 45.3 %    Monocyte % 9.2 5.0 - 12.0 %    Eosinophil % 1.5 0.3 - 6.2 %    Basophil % 0.2 0.0 - 1.5 %    Immature Grans % 0.4 0.0 - 0.5 %    Neutrophils, Absolute 6.15 1.70 - 7.00 10*3/mm3    Lymphocytes, Absolute 1.39 0.70 - 3.10 10*3/mm3    Monocytes, Absolute 0.78 0.10 - 0.90 10*3/mm3    Eosinophils, Absolute 0.13 0.00 - 0.40 10*3/mm3    Basophils, Absolute 0.02 0.00 - 0.20 10*3/mm3    Immature Grans, Absolute 0.03 0.00 - 0.05 10*3/mm3    nRBC 0.0 0.0 - 0.2 /100 WBC   Respiratory Panel PCR w/COVID-19(SARS-CoV-2)  [facility]/NATY/SHASTA/PAD/COR/KARLA In-House, NP Swab in UTM/VTM, 2 HR TAT - Swab, Nasopharynx    Collection Time: 07/05/24  1:23 PM    Specimen: Nasopharynx; Swab   Result Value Ref Range    ADENOVIRUS, PCR Not Detected Not Detected    Coronavirus 229E Not Detected Not Detected    Coronavirus HKU1 Not Detected Not Detected    Coronavirus NL63 Not Detected Not Detected    Coronavirus OC43 Not Detected Not Detected    COVID19 Not Detected Not Detected - Ref. Range    Human Metapneumovirus Not Detected Not Detected    Human Rhinovirus/Enterovirus Not Detected Not Detected    Influenza A PCR Not Detected Not Detected    Influenza B PCR Not Detected Not Detected    Parainfluenza Virus 1 Not Detected Not Detected    Parainfluenza Virus 2 Not Detected Not Detected    Parainfluenza Virus 3 Not Detected Not Detected    Parainfluenza Virus 4 Not Detected Not Detected    RSV, PCR Not Detected Not Detected    Bordetella pertussis pcr Not Detected Not Detected    Bordetella parapertussis PCR Not Detected Not Detected    Chlamydophila pneumoniae PCR Not Detected Not Detected    Mycoplasma pneumo by PCR Not Detected Not Detected   ECG 12 Lead ED Triage Standing Order; Chest Pain    Collection Time: 07/05/24  3:30 PM   Result Value Ref Range    QT Interval 440 ms    QTC Interval 461 ms       If labs were ordered, I independently reviewed the results and considered them in treating the patient.        RADIOLOGY  XR Chest 1 View    Result Date: 7/5/2024  XR CHEST 1 VW Date of Exam: 7/5/2024 1:16 PM EDT Indication: Chest Pain Triage Protocol Comparison: 7/1/2024 Findings: Heart and pulmonary vessels appear within normal limits. Previously seen mild pulmonary edema has resolved. No acute infiltrates or effusions are identified.     Impression: No acute process. Electronically Signed: Elena Albrecht MD  7/5/2024 1:29 PM EDT  Workstation ID: [id number]     I ordered and independently reviewed the above noted radiographic studies.      I viewed  images of chest x-ray which showed no acute disease per my independent interpretation.    See radiologist's dictation for official interpretation.        PROCEDURES    Critical Care    Performed by: Shayne Contreras MD  Authorized by: Shayne Contreras MD    Critical care provider statement:     Critical care time (minutes):  35    Critical care time was exclusive of:  Separately billable procedures and treating other patients    Critical care was necessary to treat or prevent imminent or life-threatening deterioration of the following conditions:  Cardiac failure    Critical care was time spent personally by me on the following activities:  Ordering and performing treatments and interventions, ordering and review of laboratory studies, ordering and review of radiographic studies, pulse oximetry, re-evaluation of patient's condition, review of old charts, obtaining history from patient or surrogate, examination of patient, evaluation of patient's response to treatment, discussions with consultants and development of treatment plan with patient or surrogate      ECG 12 Lead ED Triage Standing Order; Chest Pain   Preliminary Result   Test Reason : ED Triage Standing Order~   Blood Pressure :   */*   mmHG   Vent. Rate :  66 BPM     Atrial Rate :  66 BPM      P-R Int : 142 ms          QRS Dur :  90 ms       QT Int : 440 ms       P-R-T Axes :  56  18  62 degrees      QTc Int : 461 ms      Sinus rhythm with frequent premature ventricular complexes   Minimal voltage criteria for LVH, may be normal variant   Inferior infarct (cited on or before 01-JUL-2024)   Abnormal ECG   When compared with ECG of 05-JUL-2024 12:56,   No significant change was found      Referred By: ED MD           Confirmed By:       ECG 12 Lead ED Triage Standing Order; Chest Pain   Final Result   Test Reason : ED Triage Standing Order~   Blood Pressure :   */*   mmHG   Vent. Rate :  70 BPM     Atrial Rate :  70 BPM      P-R Int : 152 ms          QRS  Dur :  90 ms       QT Int : 428 ms       P-R-T Axes :  35  25  40 degrees      QTc Int : 462 ms      Sinus rhythm with occasional premature ventricular complexes   Left ventricular hypertrophy with repolarization abnormality   Possible Inferior infarct , age undetermined   Abnormal ECG      Confirmed by MADAY GUTIERREZ MD ([id number]) on 7/5/2024 2:40:28 PM      Referred By: EDMD           Confirmed By: MADAY GUTIERREZ MD          MEDICATIONS GIVEN IN ER    Medications   sodium chloride 0.9 % flush 10 mL (has no administration in time range)   nitroglycerin (NITROSTAT) SL tablet 0.4 mg (0.4 mg Sublingual Given 7/5/24 1352)   nitroglycerin (TRIDIL) 200 mcg/ml infusion (has no administration in time range)   Enoxaparin Sodium (LOVENOX) syringe 60 mg (has no administration in time range)   furosemide (LASIX) injection 20 mg (has no administration in time range)   aspirin chewable tablet 324 mg (324 mg Oral Given 7/5/24 1352)         MEDICAL DECISION MAKING, PROGRESS, and CONSULTS    All labs, if obtained, have been independently reviewed by me.  All radiology studies, if obtained, have been reviewed by me and the radiologist dictating the report.  All EKG's, if obtained, have been independently viewed and interpreted by me/my attending physician.      Discussion below represents my analysis of pertinent findings related to patient's condition, differential diagnosis, treatment plan and final disposition.          HEART Score: 7                Differential diagnosis:    Acute coronary syndrome versus PE versus pneumonia versus CHF, etc.      Additional sources:    - Discussed/ obtained information from independent historians: Patient's daughter is with her and is a very good historian.    - External (non-ED) record review: Reviewed multiple outpatient records to include:    ECHO 4/18/2018 mild LV hypert  2006 normal stress test    - Chronic or social conditions impacting care: Elderly, hard of hearing    - Shared decision making:  Patient/patient representative in complete agreement with current plans for evaluation and management.      Orders placed during this visit:  Orders Placed This Encounter   Procedures    Critical Care    COVID PRE-OP / PRE-PROCEDURE SCREENING ORDER (NO ISOLATION) - Swab, Nasopharynx    Respiratory Panel PCR w/COVID-19(SARS-CoV-2) [facility]/NATY/SHASTA/PAD/COR/KARLA In-House, NP Swab in UTM/VTM, 2 HR TAT - Swab, Nasopharynx    XR Chest 1 View    [place] Draw    High Sensitivity Troponin T    Comprehensive Metabolic Panel    Lipase    BNP    CBC Auto Differential    High Sensitivity Troponin T 2Hr    NPO Diet NPO Type: Strict NPO    Undress & Gown    Continuous Pulse Oximetry    Oxygen Therapy- Nasal Cannula; Titrate 1-6 LPM Per SpO2; 90 - 95%    ECG 12 Lead ED Triage Standing Order; Chest Pain    ECG 12 Lead ED Triage Standing Order; Chest Pain    Insert Peripheral IV    CBC & Differential    Green Top (Gel)    Lavender Top    Gold Top - SST    Gray Top    Light Blue Top         Additional orders considered but not ordered:  Echocardiogram    ED Course:    Consultants:      ED Course as of 07/05/24 1548   Fri Jul 05, 2024   1400 We have administered aspirin 324 mg orally and are in the process of administering sublingual nitroglycerin. [MS]   1440 proBNP(!): 3,885.0  4 days ago BNP was 1600. [MS]   1440 HS Troponin T(!!): 67  4 days ago troponin was 82. [MS]   1441 Creatinine(!): 1.31  4 days ago the creatinine was 1.55. [MS]   1445 I have contacted [facility] [facility] cardiology consultation office and they will send a cardiologist for evaluation of this patient. [MS]   1542 Spoke with , cardiology on-call.  He has evaluated the patient and feels that she needs admission, nitroglycerin drip and Lovenox.  I am ensuring that those will be administered starting while in the emergency department.  Given the patient's age, health status he request hospitalist admission.  I am contacting them at this point. [MS]   1547 I have  contacted Dr. Correa, hospitalist for admission. [MS]      ED Course User Index  [MS] Shayne Contreras MD              Shared Decision Making:  After my consideration of clinical presentation and any laboratory/radiology studies obtained, I discussed the findings with the patient/patient representative who is in agreement with the treatment plan and the final disposition.   Risks and benefits of discharge and/or observation/admission were discussed.       AS OF 15:48 EDT VITALS:    BP - 121/100  HR - 61  TEMP - 97.7 °F (36.5 °C) (Oral)  O2 SATS - 95%                  DIAGNOSIS  Final diagnoses:   Nonspecific chest pain         DISPOSITION  Admission      Please note that portions of this document were completed with voice recognition software.        Shayne Contreras MD  07/08/24 6688

## 2024-07-06 ENCOUNTER — APPOINTMENT (OUTPATIENT)
Dept: CARDIOLOGY | Facility: HOSPITAL | Age: 89
End: 2024-07-06
Payer: MEDICARE

## 2024-07-06 LAB
ANION GAP SERPL CALCULATED.3IONS-SCNC: 10 MMOL/L (ref 5–15)
BACTERIA SPEC AEROBE CULT: NORMAL
BACTERIA SPEC AEROBE CULT: NORMAL
BASOPHILS # BLD AUTO: 0.02 10*3/MM3 (ref 0–0.2)
BASOPHILS NFR BLD AUTO: 0.3 % (ref 0–1.5)
BH CV ECHO MEAS - AO MAX PG: 5.9 MMHG
BH CV ECHO MEAS - AO MEAN PG: 3 MMHG
BH CV ECHO MEAS - AO ROOT DIAM: 3 CM
BH CV ECHO MEAS - AO V2 MAX: 121 CM/SEC
BH CV ECHO MEAS - AO V2 VTI: 29.4 CM
BH CV ECHO MEAS - AVA(I,D): 1.7 CM2
BH CV ECHO MEAS - EDV(CUBED): 125 ML
BH CV ECHO MEAS - EDV(MOD-SP2): 107 ML
BH CV ECHO MEAS - EDV(MOD-SP4): 125 ML
BH CV ECHO MEAS - EF(MOD-BP): 57.7 %
BH CV ECHO MEAS - EF(MOD-SP2): 62.4 %
BH CV ECHO MEAS - EF(MOD-SP4): 58.4 %
BH CV ECHO MEAS - ESV(CUBED): 42.9 ML
BH CV ECHO MEAS - ESV(MOD-SP2): 40.2 ML
BH CV ECHO MEAS - ESV(MOD-SP4): 52 ML
BH CV ECHO MEAS - FS: 30 %
BH CV ECHO MEAS - IVS/LVPW: 1 CM
BH CV ECHO MEAS - IVSD: 1.4 CM
BH CV ECHO MEAS - LA DIMENSION: 3.9 CM
BH CV ECHO MEAS - LAT PEAK E' VEL: 6.9 CM/SEC
BH CV ECHO MEAS - LV MASS(C)D: 291.4 GRAMS
BH CV ECHO MEAS - LV MAX PG: 1.93 MMHG
BH CV ECHO MEAS - LV MEAN PG: 1 MMHG
BH CV ECHO MEAS - LV V1 MAX: 69.5 CM/SEC
BH CV ECHO MEAS - LV V1 VTI: 15.9 CM
BH CV ECHO MEAS - LVIDD: 5 CM
BH CV ECHO MEAS - LVIDS: 3.5 CM
BH CV ECHO MEAS - LVOT AREA: 3.1 CM2
BH CV ECHO MEAS - LVOT DIAM: 2 CM
BH CV ECHO MEAS - LVPWD: 1.4 CM
BH CV ECHO MEAS - MED PEAK E' VEL: 6.1 CM/SEC
BH CV ECHO MEAS - MV A MAX VEL: 72.4 CM/SEC
BH CV ECHO MEAS - MV DEC TIME: 0.28 SEC
BH CV ECHO MEAS - MV E MAX VEL: 114 CM/SEC
BH CV ECHO MEAS - MV E/A: 1.57
BH CV ECHO MEAS - MV MAX PG: 5.6 MMHG
BH CV ECHO MEAS - MV MEAN PG: 1.67 MMHG
BH CV ECHO MEAS - MV V2 VTI: 35.5 CM
BH CV ECHO MEAS - MVA(VTI): 1.41 CM2
BH CV ECHO MEAS - RAP SYSTOLE: 3 MMHG
BH CV ECHO MEAS - RVSP: 41 MMHG
BH CV ECHO MEAS - SV(LVOT): 50 ML
BH CV ECHO MEAS - SV(MOD-SP2): 66.8 ML
BH CV ECHO MEAS - SV(MOD-SP4): 73 ML
BH CV ECHO MEAS - TAPSE (>1.6): 1.52 CM
BH CV ECHO MEAS - TR MAX PG: 37.9 MMHG
BH CV ECHO MEAS - TR MAX VEL: 308 CM/SEC
BH CV ECHO MEASUREMENTS AVERAGE E/E' RATIO: 17.54
BH CV VAS BP LEFT ARM: NORMAL MMHG
BH CV XLRA - TDI S': 13.8 CM/SEC
BUN SERPL-MCNC: 31 MG/DL (ref 8–23)
BUN/CREAT SERPL: 23.3 (ref 7–25)
CALCIUM SPEC-SCNC: 9.3 MG/DL (ref 8.2–9.6)
CHLORIDE SERPL-SCNC: 99 MMOL/L (ref 98–107)
CHOLEST SERPL-MCNC: 180 MG/DL (ref 0–200)
CO2 SERPL-SCNC: 26 MMOL/L (ref 22–29)
CREAT SERPL-MCNC: 1.33 MG/DL (ref 0.57–1)
DEPRECATED RDW RBC AUTO: 47.6 FL (ref 37–54)
EGFRCR SERPLBLD CKD-EPI 2021: 37.4 ML/MIN/1.73
EOSINOPHIL # BLD AUTO: 0.2 10*3/MM3 (ref 0–0.4)
EOSINOPHIL NFR BLD AUTO: 2.8 % (ref 0.3–6.2)
ERYTHROCYTE [DISTWIDTH] IN BLOOD BY AUTOMATED COUNT: 15.9 % (ref 12.3–15.4)
GLUCOSE BLDC GLUCOMTR-MCNC: 109 MG/DL (ref 70–130)
GLUCOSE BLDC GLUCOMTR-MCNC: 129 MG/DL (ref 70–130)
GLUCOSE BLDC GLUCOMTR-MCNC: 135 MG/DL (ref 70–130)
GLUCOSE BLDC GLUCOMTR-MCNC: 88 MG/DL (ref 70–130)
GLUCOSE SERPL-MCNC: 99 MG/DL (ref 65–99)
HBA1C MFR BLD: 5.9 % (ref 4.8–5.6)
HCT VFR BLD AUTO: 33.7 % (ref 34–46.6)
HDLC SERPL-MCNC: 37 MG/DL (ref 40–60)
HGB BLD-MCNC: 10.1 G/DL (ref 12–15.9)
IMM GRANULOCYTES # BLD AUTO: 0.01 10*3/MM3 (ref 0–0.05)
IMM GRANULOCYTES NFR BLD AUTO: 0.1 % (ref 0–0.5)
LDLC SERPL CALC-MCNC: 100 MG/DL (ref 0–100)
LDLC/HDLC SERPL: 2.51 {RATIO}
LEFT ATRIUM VOLUME INDEX: 48.5 ML/M2
LYMPHOCYTES # BLD AUTO: 1.62 10*3/MM3 (ref 0.7–3.1)
LYMPHOCYTES NFR BLD AUTO: 23 % (ref 19.6–45.3)
MCH RBC QN AUTO: 24.5 PG (ref 26.6–33)
MCHC RBC AUTO-ENTMCNC: 30 G/DL (ref 31.5–35.7)
MCV RBC AUTO: 81.6 FL (ref 79–97)
MONOCYTES # BLD AUTO: 1.15 10*3/MM3 (ref 0.1–0.9)
MONOCYTES NFR BLD AUTO: 16.4 % (ref 5–12)
NEUTROPHILS NFR BLD AUTO: 4.03 10*3/MM3 (ref 1.7–7)
NEUTROPHILS NFR BLD AUTO: 57.4 % (ref 42.7–76)
NRBC BLD AUTO-RTO: 0 /100 WBC (ref 0–0.2)
PLATELET # BLD AUTO: 209 10*3/MM3 (ref 140–450)
PMV BLD AUTO: 10.6 FL (ref 6–12)
POTASSIUM SERPL-SCNC: 4.3 MMOL/L (ref 3.5–5.2)
RBC # BLD AUTO: 4.13 10*6/MM3 (ref 3.77–5.28)
SODIUM SERPL-SCNC: 135 MMOL/L (ref 136–145)
TRIGL SERPL-MCNC: 251 MG/DL (ref 0–150)
TSH SERPL DL<=0.05 MIU/L-ACNC: 1.8 UIU/ML (ref 0.27–4.2)
VLDLC SERPL-MCNC: 43 MG/DL (ref 5–40)
WBC NRBC COR # BLD AUTO: 7.03 10*3/MM3 (ref 3.4–10.8)

## 2024-07-06 PROCEDURE — 63710000001 INSULIN GLARGINE PER 5 UNITS: Performed by: NURSE PRACTITIONER

## 2024-07-06 PROCEDURE — 25010000002 ENOXAPARIN PER 10 MG: Performed by: INTERNAL MEDICINE

## 2024-07-06 PROCEDURE — 83036 HEMOGLOBIN GLYCOSYLATED A1C: CPT | Performed by: NURSE PRACTITIONER

## 2024-07-06 PROCEDURE — 85025 COMPLETE CBC W/AUTO DIFF WBC: CPT | Performed by: NURSE PRACTITIONER

## 2024-07-06 PROCEDURE — 80048 BASIC METABOLIC PNL TOTAL CA: CPT | Performed by: NURSE PRACTITIONER

## 2024-07-06 PROCEDURE — G0378 HOSPITAL OBSERVATION PER HR: HCPCS

## 2024-07-06 PROCEDURE — 93306 TTE W/DOPPLER COMPLETE: CPT | Performed by: INTERNAL MEDICINE

## 2024-07-06 PROCEDURE — 82948 REAGENT STRIP/BLOOD GLUCOSE: CPT

## 2024-07-06 PROCEDURE — 80061 LIPID PANEL: CPT | Performed by: NURSE PRACTITIONER

## 2024-07-06 PROCEDURE — 97165 OT EVAL LOW COMPLEX 30 MIN: CPT

## 2024-07-06 PROCEDURE — 99214 OFFICE O/P EST MOD 30 MIN: CPT | Performed by: INTERNAL MEDICINE

## 2024-07-06 PROCEDURE — 99232 SBSQ HOSP IP/OBS MODERATE 35: CPT | Performed by: INTERNAL MEDICINE

## 2024-07-06 PROCEDURE — 93306 TTE W/DOPPLER COMPLETE: CPT

## 2024-07-06 PROCEDURE — 84443 ASSAY THYROID STIM HORMONE: CPT | Performed by: NURSE PRACTITIONER

## 2024-07-06 RX ORDER — ENOXAPARIN SODIUM 100 MG/ML
1 INJECTION SUBCUTANEOUS EVERY 24 HOURS
Status: DISCONTINUED | OUTPATIENT
Start: 2024-07-06 | End: 2024-07-07

## 2024-07-06 RX ORDER — CEFDINIR 300 MG/1
300 CAPSULE ORAL
Status: COMPLETED | OUTPATIENT
Start: 2024-07-07 | End: 2024-07-08

## 2024-07-06 RX ORDER — HYDRALAZINE HYDROCHLORIDE 50 MG/1
50 TABLET, FILM COATED ORAL EVERY 12 HOURS SCHEDULED
Status: DISCONTINUED | OUTPATIENT
Start: 2024-07-06 | End: 2024-07-08 | Stop reason: HOSPADM

## 2024-07-06 RX ADMIN — ATORVASTATIN CALCIUM 40 MG: 40 TABLET, FILM COATED ORAL at 20:12

## 2024-07-06 RX ADMIN — HYDRALAZINE HYDROCHLORIDE 50 MG: 50 TABLET ORAL at 15:09

## 2024-07-06 RX ADMIN — LEVOTHYROXINE SODIUM 88 MCG: 88 TABLET ORAL at 05:48

## 2024-07-06 RX ADMIN — CEFDINIR 300 MG: 300 CAPSULE ORAL at 08:11

## 2024-07-06 RX ADMIN — Medication 10 ML: at 08:13

## 2024-07-06 RX ADMIN — Medication 10 ML: at 20:11

## 2024-07-06 RX ADMIN — CYANOCOBALAMIN TAB 1000 MCG 1000 MCG: 1000 TAB at 08:12

## 2024-07-06 RX ADMIN — ENOXAPARIN SODIUM 60 MG: 60 INJECTION SUBCUTANEOUS at 18:26

## 2024-07-06 RX ADMIN — INSULIN GLARGINE 25 UNITS: 100 INJECTION, SOLUTION SUBCUTANEOUS at 08:11

## 2024-07-06 RX ADMIN — METOPROLOL TARTRATE 25 MG: 25 TABLET, FILM COATED ORAL at 08:12

## 2024-07-06 RX ADMIN — ASPIRIN 81 MG: 81 TABLET, COATED ORAL at 08:12

## 2024-07-06 RX ADMIN — Medication 1 TABLET: at 08:12

## 2024-07-06 RX ADMIN — METOPROLOL TARTRATE 25 MG: 25 TABLET, FILM COATED ORAL at 20:12

## 2024-07-06 RX ADMIN — PANTOPRAZOLE SODIUM 40 MG: 40 TABLET, DELAYED RELEASE ORAL at 05:48

## 2024-07-06 NOTE — PLAN OF CARE
Goal Outcome Evaluation:  Plan of Care Reviewed With: patient        Progress: no change  Outcome Evaluation: Patient presents with impaired activity tolerance, strength, balance and ROM along with L hip pain impacting PLOF ADLs.  Pt. is appropriate for skilled OT services to promote return to PLOF.  Due to patient not being far from baseline recommend home with 24/7 assist with home OT/PT services at discharge for home safety check and to further address deficit areas pending progress.  Elevated BP post session.  Nursing alerted.      Anticipated Discharge Disposition (OT): home with 24/7 care, home with home health

## 2024-07-06 NOTE — PROGRESS NOTES
[facility] Medicine Services  PROGRESS NOTE    Patient Name: Ninoska Gloria  : 1931  MRN: [id number]    Date of Admission: 2024  Primary Care Physician: Lizette Fitzpatrick PA    Subjective   Subjective     CC:  F/u chest pain    HPI:  TTE being obtained. Chest pain feels better this morning. She is hungry      Objective   Objective     Vital Signs:   Temp:  [97.3 °F (36.3 °C)-98.6 °F (37 °C)] 98.3 °F (36.8 °C)  Heart Rate:  [47-71] 60  Resp:  [16-18] 18  BP: (121-200)/() 180/76     Physical Exam:  Constitutional: Awake, alert, laying in bed in NAD  Respiratory: Clear to auscultation bilaterally, respiratory effort normal   Cardiovascular: RRR, palpable radial pulse  Gastrointestinal: Positive bowel sounds, soft, nontender, nondistended  Musculoskeletal: Trace LE edema  Psychiatric: Appropriate affect, cooperative  Neurologic: Hard of hearing    Results Reviewed:  LAB RESULTS:      Lab 24  1517   WBC 7.03 8.50  --  8.15   HEMOGLOBIN 10.1* 11.2*  --  10.3*   HEMATOCRIT 33.7* 37.1  --  35.4   PLATELETS 209 249  --  235   NEUTROS ABS 4.03 6.15  --  5.91   IMMATURE GRANS (ABS) 0.01 0.03  --  0.02   LYMPHS ABS 1.62 1.39  --  1.38   MONOS ABS 1.15* 0.78  --  0.78   EOS ABS 0.20 0.13  --  0.04   MCV 81.6 82.8  --  83.3   PROCALCITONIN  --   --   --  0.09   LACTATE  --   --  1.3  --          Lab 24  1517   SODIUM 135* 142 142   POTASSIUM 4.3 4.5 4.3   CHLORIDE 99 104 106   CO2 26.0 29.0 25.0   ANION GAP 10.0 9.0 11.0   BUN 31* 29* 38*   CREATININE 1.33* 1.31* 1.55*   EGFR 37.4* 38.1* 31.1*   GLUCOSE 99 84 152*   CALCIUM 9.3 10.1* 9.3   MAGNESIUM  --   --  1.8   PHOSPHORUS  --   --  3.4   HEMOGLOBIN A1C 5.90*  --   --    TSH 1.800  --  0.990         Lab 24  1323 24  1517   TOTAL PROTEIN 7.5 7.0   ALBUMIN 3.8 3.5   GLOBULIN 3.7 3.5   ALT (SGPT) 24 29   AST (SGOT) 37* 49*   BILIRUBIN 0.2  <0.2   ALK PHOS 124* 129*   LIPASE 36 38         Lab 07/05/24  1533 07/05/24  1323 07/01/24  1751 07/01/24  1517   PROBNP  --  3,885.0*  --  1,648.0   HSTROP T 63* 67* 82* 73*         Lab 07/06/24  0423   CHOLESTEROL 180   LDL CHOL 100   HDL CHOL 37*   TRIGLYCERIDES 251*             Brief Urine Lab Results  (Last result in the past 365 days)        Color   Clarity   Blood   Leuk Est   Nitrite   Protein   CREAT   Urine HCG        07/01/24 1629 Yellow   Clear   Negative   Small (1+)   Positive   100 mg/dL (2+)                   Microbiology Results Abnormal       Procedure Component Value - Date/Time    COVID PRE-OP / PRE-PROCEDURE SCREENING ORDER (NO ISOLATION) - Swab, Nasopharynx [[id number]]  (Normal) Collected: 07/05/24 1323    Lab Status: Final result Specimen: Swab from Nasopharynx Updated: 07/05/24 1424    Narrative:      The following orders were created for panel order COVID PRE-OP / PRE-PROCEDURE SCREENING ORDER (NO ISOLATION) - Swab, Nasopharynx.  Procedure                               Abnormality         Status                     ---------                               -----------         ------                     Respiratory Panel PCR w/...[[id number]]  Normal              Final result                 Please view results for these tests on the individual orders.    Respiratory Panel PCR w/COVID-19(SARS-CoV-2) ADAM/NATY/SHASTA/PAD/COR/KARLA In-House, NP Swab in UTM/VTM, 2 HR TAT - Swab, Nasopharynx [[id number]]  (Normal) Collected: 07/05/24 1323    Lab Status: Final result Specimen: Swab from Nasopharynx Updated: 07/05/24 1424     ADENOVIRUS, PCR Not Detected     Coronavirus 229E Not Detected     Coronavirus HKU1 Not Detected     Coronavirus NL63 Not Detected     Coronavirus OC43 Not Detected     COVID19 Not Detected     Human Metapneumovirus Not Detected     Human Rhinovirus/Enterovirus Not Detected     Influenza A PCR Not Detected     Influenza B PCR Not Detected     Parainfluenza Virus 1 Not Detected      Parainfluenza Virus 2 Not Detected     Parainfluenza Virus 3 Not Detected     Parainfluenza Virus 4 Not Detected     RSV, PCR Not Detected     Bordetella pertussis pcr Not Detected     Bordetella parapertussis PCR Not Detected     Chlamydophila pneumoniae PCR Not Detected     Mycoplasma pneumo by PCR Not Detected    Narrative:      In the setting of a positive respiratory panel with a viral infection PLUS a negative procalcitonin without other underlying concern for bacterial infection, consider observing off antibiotics or discontinuation of antibiotics and continue supportive care. If the respiratory panel is positive for atypical bacterial infection (Bordetella pertussis, Chlamydophila pneumoniae, or Mycoplasma pneumoniae), consider antibiotic de-escalation to target atypical bacterial infection.            XR Chest 1 View    Result Date: 7/5/2024  XR CHEST 1 VW Date of Exam: 7/5/2024 1:16 PM EDT Indication: Chest Pain Triage Protocol Comparison: 7/1/2024 Findings: Heart and pulmonary vessels appear within normal limits. Previously seen mild pulmonary edema has resolved. No acute infiltrates or effusions are identified.     Impression: Impression: No acute process. Electronically Signed: Elena Albrecht MD  7/5/2024 1:29 PM EDT  Workstation ID: [id number]     Results for orders placed during the hospital encounter of 04/16/18    Adult Transthoracic Echo Complete W/ Cont if Necessary Per Protocol    Interpretation Summary  · Left ventricular wall thickness is consistent with mild concentric hypertrophy.  · Left ventricular diastolic dysfunction (grade I a) consistent with impaired relaxation.  · There is mild calcification of the aortic valve.  · Mild mitral valve regurgitation is present  · Mild tricuspid valve regurgitation is present.  · Calculated right ventricular systolic pressure from tricuspid regurgitation is 79 mmHg.  · There is a small (<1cm) pericardial effusion.  · Calculated EF = 56%. Normal left  ventricular cavity size noted. All left ventricular wall segments contract normally. asynchronous septal motion with mild hypokinesis of inferior wall Left ventricular wall thickness is consistent with mild concentric hypertrophy. Left ventricular diastolic dysfunction is noted (grade I a w/high LAP) consistent with impaired relaxation.      Current medications:  Scheduled Meds:aspirin, 81 mg, Oral, Daily  atorvastatin, 40 mg, Oral, Nightly  [START ON 7/7/2024] cefdinir, 300 mg, Oral, Q24H  enoxaparin, 1 mg/kg, Subcutaneous, Q24H  insulin glargine, 25 Units, Subcutaneous, Daily  insulin lispro, 2-9 Units, Subcutaneous, 4x Daily AC & at Bedtime  levothyroxine, 88 mcg, Oral, Daily  metoprolol tartrate, 25 mg, Oral, BID  multivitamin with minerals, 1 tablet, Oral, Daily  pantoprazole, 40 mg, Oral, Q AM  sodium chloride, 10 mL, Intravenous, Q12H  vitamin B-12, 1,000 mcg, Oral, Once per day on Sunday Tuesday Thursday Saturday      Continuous Infusions:nitroglycerin, 10-50 mcg/min, Last Rate: 15 mcg/min (07/06/24 0548)      PRN Meds:.  acetaminophen    senna-docusate sodium **AND** polyethylene glycol **AND** bisacodyl **AND** bisacodyl    dextrose    dextrose    glucagon (human recombinant)    nitroglycerin    sodium chloride    sodium chloride    sodium chloride    Assessment & Plan   Assessment & Plan     Active Hospital Problems    Diagnosis  POA    **Chest pain [R07.9]  Yes    Stage 3b chronic kidney disease [N18.32]  Yes    Chronic diastolic CHF (congestive heart failure) [I50.32]  Yes    GERD without esophagitis [K21.9]  Yes    Cirrhosis of liver without ascites [K74.60]  Yes    Coronary artery disease involving native coronary artery of native heart without angina pectoris [I25.10]  Yes    Essential hypertension [I10]  Yes    Dyslipidemia [E78.5]  Yes    Type 2 diabetes mellitus without complication, with long-term current use of insulin [E11.9, Z79.4]  Not Applicable    Paroxysmal atrial fibrillation [I48.0]  Yes       Resolved Hospital Problems   No resolved problems to display.        Brief Hospital Course to date:  Ninoska Gloria is a 89 y.o. female w/ CAD, HTN, HLD, Afib not on AC, HFpEF, DM2, CKD3, ACUNA cirrhosis, who presented w/ acute onset chest/epigastric pain initially improved w/ nitro then refractory to the same; troponin was minimally elevated on arrival and level on recheck; cardiology recommended medical management    The following problems are new to me today    Assessment/Plan    Acute chest/epigastric pain  CAD  HTN  HLD  Afib not on oral AC  Chronic HFpEF, compensated  Chronic O2 use  -seen by cardiology, rec'd medical management w/ nitro gtt and Tx dose lovenox  -repeat TTE pending  -cont asa, statin, lopressor  -cards following  -pain improved this AM    Abnormal UA  -seen in the ED 7/1 for generalized weakness, Dx'ed w/ UTI and Rx'ed oral Abx; Cx is 50k CFU E Coli  -doubt clinically significant UTI, to avoid partial Tx will cont cefdinir as prev Rx    DM type 2, A1c 5.9%, w/ insulin use  -lantus w/ SSI    Cirrhosis w/o known ascites  CKD stage 3 - baseline 1.1-1.6; eGFR 30's  GERD - ppi  Hypothyroidism - levothyroxine  Dementia    *Patient lives w/ family who help assist with her care       Expected Discharge Location and Transportation: tbd pending clinical course, potentially home w/ family  Expected Discharge   Expected Discharge Date: 7/7/2024; Expected Discharge Time:      VTE Prophylaxis:  Pharmacologic & mechanical VTE prophylaxis orders are present.         AM-PAC 6 Clicks Score (PT): 18 (07/05/24 2057)    CODE STATUS:   Code Status and Medical Interventions:   Ordered at: 07/05/24 1625     Level Of Support Discussed With:    Patient    Next of Kin (If No Surrogate)     Code Status (Patient has no pulse and is not breathing):    CPR (Attempt to Resuscitate)     Medical Interventions (Patient has pulse or is breathing):    Full Support       Jose Robertson, DO  07/06/24

## 2024-07-06 NOTE — THERAPY EVALUATION
"Patient Name: Ninoska Gloria  : 1931    MRN: [id number]                              Today's Date: 2024       Admit Date: 2024    Visit Dx:     ICD-10-CM ICD-9-CM   1. Nonspecific chest pain  R07.9 786.50     Patient Active Problem List   Diagnosis    Pneumonia of lower lobe due to infectious organism    Coronary artery disease involving native coronary artery of native heart without angina pectoris    Essential hypertension    Dyslipidemia    Type 2 diabetes mellitus without complication, with long-term current use of insulin    Paroxysmal atrial fibrillation    Cirrhosis of liver without ascites    PMB (postmenopausal bleeding)    Recurrent UTI    Stage 3b chronic kidney disease    Chronic diastolic CHF (congestive heart failure)    GERD without esophagitis    UTI (urinary tract infection)    Chest pain     Past Medical History:   Diagnosis Date    Abnormal ECG     Anemia     Anxiety and depression     Arrhythmia     Atrial fibrillation     Daughter reported \"in the past\"    Back pain     Cancer     Daughter reported skin cancer removed from nose in the past    CHF (congestive heart failure)     Cirrhosis     CKD (chronic kidney disease), stage III     \"Renal failure stage 3\"    Constipation     Reported intermittent episodes    COPD (chronic obstructive pulmonary disease)     Coronary artery disease     Patient daughter reported 2 coronary stents    COVID-19 2021    Diabetes mellitus 's    Diarrhea     Reported intermittent episodes    Disease of thyroid gland     Elevated cholesterol     Full dentures     Patient's daughter advised no adhesives DOS    GERD (gastroesophageal reflux disease)     Headache     Heart murmur     High triglycerides     History of blood transfusion     Patient's daughter reported unsure but this was previously noted in patients record    History of bronchitis     History of pneumonia     [place] (hard of hearing)     Patient's daughter reported severely [place] and that " patient has hearing aids but doesn't wear them    Hyperlipidemia     Hypertension     Iron deficiency     Myocardial infarction     Patient's daughter reported placement of 2 stents    Osteoarthritis     Ovarian cyst     Pneumonia     Retinal detachment     Shingles     about to finish treatment up     SOB (shortness of breath)     Vision problems      Past Surgical History:   Procedure Laterality Date    CARDIAC CATHETERIZATION      Patient's daughter reported placement of 2 stents    CATARACT EXTRACTION Bilateral 2009     SECTION      x 5: 1948, 1950, 195, 195, 196    COLONOSCOPY      CORONARY STENT PLACEMENT      ENDOSCOPY N/A 2021    Procedure: ESOPHAGOGASTRODUODENOSCOPY WITH BIOPSY;  Surgeon: Marlon Dixon MD;  Location: [facility] ENDOSCOPY;  Service: Gastroenterology;  Laterality: N/A;    MOUTH SURGERY      full mouth extraction    SKIN CANCER EXCISION      TUBAL ABDOMINAL LIGATION        General Information       Row Name 24 1508          OT Time and Intention    Document Type evaluation  -AMY     Mode of Treatment individual therapy;occupational therapy  -AMY       Row Name 24 1508          General Information    Patient Profile Reviewed yes  -AMY     Prior Level of Function independent:;all household mobility;dependent:;w/c or scooter;cooking;cleaning;driving;min assist:;dressing;bathing  pt. able to feed and groom, pt. typically sponge baths and then leans over tub to wash hair, recently tried to tub bath and feel to rear, family sometimes assist with bath and LBD, if pt. shops or leaves home typically uses a w/c  -AMY     Existing Precautions/Restrictions fall  -AMY     Barriers to Rehab previous functional deficit  -AMY       Row Name 24 1503          Occupational Profile    Environmental Supports and Barriers (Occupational Profile) tub shower with shower chair, pt., unsure if has any grab bars in her bathroom, per pt. she uses her  rollator in home and on sidewalk, but a standard walker in yard and w/c for community  -AMY       Row Name 07/06/24 1508          Living Environment    People in Home child(ildefonso), adult  per pt. her 2 daughters stay with her at all times each staying 2-3 weeks at a time  -AMY       Row Name 07/06/24 1508          Home Main Entrance    Number of Stairs, Main Entrance other (see comments)  ramp  -AMY       Row Name 07/06/24 1508          Cognition    Orientation Status (Cognition) oriented x 3  pt. some difficulty recalling home setup or giving specific details about PLOF  -AMY       Row Name 07/06/24 1508          Safety Issues, Functional Mobility    Safety Issues Affecting Function (Mobility) safety precaution awareness;insight into deficits/self-awareness  -AMY     Impairments Affecting Function (Mobility) balance;cognition;endurance/activity tolerance;pain;sensation/sensory awareness;strength  -AMY     Cognitive Impairments, Mobility Safety/Performance insight into deficits/self-awareness;safety precaution awareness  -AMY               User Key  (r) = Recorded By, (t) = Taken By, (c) = Cosigned By      Initials Name Provider Type    Alecia Finney, OT Occupational Therapist                     Mobility/ADL's       Row Name 07/06/24 1513          Bed Mobility    Comment, (Bed Mobility) pt. up in bathroom with nurse and family present, nurse turned pt. over to OT and family left to get lunch  -       Row Name 07/06/24 1513          Transfers    Transfers sit-stand transfer;stand-sit transfer;toilet transfer  -AMY       Row Name 07/06/24 1513          Sit-Stand Transfer    Sit-Stand [place] (Transfers) contact guard  -AMY     Assistive Device (Sit-Stand Transfers) walker, front-wheeled  -AMY       Row Name 07/06/24 1513          Stand-Sit Transfer    Stand-Sit [place] (Transfers) contact guard  -AMY     Assistive Device (Stand-Sit Transfers) walker, front-wheeled  -AMY       Row Name 07/06/24 1513          Toilet  Transfer    Type (Toilet Transfer) sit-stand  -AMY     [place] Level (Toilet Transfer) standby assist  -AMY     Assistive Device (Toilet Transfer) commode;grab bars/safety frame  -       Row Name 07/06/24 1513          Functional Mobility    Functional Mobility- Ind. Level contact guard assist  -AMY     Functional Mobility- Device walker, front-wheeled;other (see comments)  RUE support toilet to sink and sink to EOB, rw wx use bed to recliner  -     Functional Mobility-Distance (Feet) 4  + 8 + 50 ft. grossly  -AMY     Functional Mobility- Safety Issues step length decreased  -AMY     Functional Mobility- Comment unsteady without walker use, pt. reports R hip pain  -       Row Name 07/06/24 1513          Activities of Daily Living    BADL Assessment/Intervention upper body dressing;lower body dressing;grooming;toileting  -       Row Name 07/06/24 1513          Upper Body Dressing Assessment/Training    [place] Level (Upper Body Dressing) don;pajama/robe;maximum assist (25% patient effort)  -AMY     Position (Upper Body Dressing) edge of bed sitting  -AMY     Comment, (Upper Body Dressing) limited by lines  -       Row Name 07/06/24 1513          Lower Body Dressing Assessment/Training    [place] Level (Lower Body Dressing) doff;don;socks;contact guard assist  -AMY     Position (Lower Body Dressing) edge of bed sitting  -AMY     Comment, (Lower Body Dressing) extra time and effort needed  -       Row Name 07/06/24 1513          Grooming Assessment/Training    [place] Level (Grooming) minimum assist (75% patient effort)  -AMY     Position (Grooming) sink side;unsupported standing  -AMY     Comment, (Grooming) pt. needed assist to dispense soap and reach paper towels to dry hands post toileting  -       Row Name 07/06/24 1513          Toileting Assessment/Training    [place] Level (Toileting) adjust/manage clothing;perform perineal hygiene;standby assist  -AMY     Position (Toileting)  unsupported sitting;unsupported standing  -AMY               User Key  (r) = Recorded By, (t) = Taken By, (c) = Cosigned By      Initials Name Provider Type    Alecia Finney OT Occupational Therapist                   Obj/Interventions       Row Name 07/06/24 1517          Sensory Assessment (Somatosensory)    Sensory Assessment pt. reported h/o  diminished sensation in hands and feet with some intermittent tingling and burning  -       Row Name 07/06/24 1517          Vision Assessment/Intervention    Visual Impairment/Limitations corrective lenses full-time  -       Row Name 07/06/24 1517          Range of Motion Comprehensive    General Range of Motion upper extremity range of motion deficits identified  -AMY     Comment, General Range of Motion B shoulder limited grossly 50% PROM  -[facility] Name 07/06/24 1517          Strength Comprehensive (MMT)    General Manual Muscle Testing (MMT) Assessment upper extremity strength deficits identified  -AMY     Comment, General Manual Muscle Testing (MMT) Assessment B shoulders grossly 3+ to 4/5, distal UE's grossly 4 to 4+/5  -       Row Name 07/06/24 1517          Motor Skills    Motor Skills functional endurance  -AMY     Functional Endurance fair  -[facility] Name 07/06/24 1517          Balance    Balance Assessment sitting static balance;sitting dynamic balance;standing static balance;standing dynamic balance  -AMY     Static Sitting Balance standby assist  -AMY     Dynamic Sitting Balance contact guard  LBD  -AMY     Position, Sitting Balance unsupported;sitting edge of bed  toilet  -AMY     Static Standing Balance standby assist  -AMY     Dynamic Standing Balance contact guard  -AMY     Position/Device Used, Standing Balance walker, front-wheeled;supported;unsupported  -AMY     Balance Interventions sit to stand;occupation based/functional task  -AMY               User Key  (r) = Recorded By, (t) = Taken By, (c) = Cosigned By      Initials Name Provider Type     AMY Alecia Martell, OT Occupational Therapist                   Goals/Plan       Row Name 07/06/24 1526          Bed Mobility Goal 1 (OT)    Activity/Assistive Device (Bed Mobility Goal 1, OT) bed mobility activities, all  -AMY     [place] Level/Cues Needed (Bed Mobility Goal 1, OT) modified independence  -AMY     Time Frame (Bed Mobility Goal 1, OT) short term goal (STG);5 days  -AMY     Progress/Outcomes (Bed Mobility Goal 1, OT) new goal  -AMY       Row Name 07/06/24 1526          Transfer Goal 1 (OT)    Activity/Assistive Device (Transfer Goal 1, OT) sit-to-stand/stand-to-sit;bed-to-chair/chair-to-bed;toilet;walker, rolling;commode  -AMY     [place] Level/Cues Needed (Transfer Goal 1, OT) modified independence  -AMY     Time Frame (Transfer Goal 1, OT) long term goal (LTG);10 days  -AMY     Strategies/Barriers (Transfers Goal 1, OT) good safety demonstrated  -AMY     Progress/Outcome (Transfer Goal 1, OT) new goal  -AMY       Row Name 07/06/24 1526          Bathing Goal 1 (OT)    Activity/Device (Bathing Goal 1, OT) upper body bathing;lower body bathing;shower chair  -AMY     [place] Level/Cues Needed (Bathing Goal 1, OT) minimum assist (75% or more patient effort)  -AMY     Time Frame (Bathing Goal 1, OT) long term goal (LTG);10 days  -AMY     Progress/Outcomes (Bathing Goal 1, OT) new goal  -AMY       Row Name 07/06/24 1526          Dressing Goal 1 (OT)    Activity/Device (Dressing Goal 1, OT) lower body dressing  -AMY     [place]/Cues Needed (Dressing Goal 1, OT) standby assist  -AMY     Time Frame (Dressing Goal 1, OT) long term goal (LTG);10 days  -AMY     Strategies/Barriers (Dressing Goal 1, OT) undergarment/brief  -AMY     Progress/Outcome (Dressing Goal 1, OT) new goal  -AMY       Row Name 07/06/24 1526          Therapy Assessment/Plan (OT)    Planned Therapy Interventions (OT) activity tolerance training;BADL retraining;cognitive/visual perception retraining;functional balance  retraining;occupation/activity based interventions;patient/caregiver education/training;ROM/therapeutic exercise  -               User Key  (r) = Recorded By, (t) = Taken By, (c) = Cosigned By      Initials Name Provider Type    Alecia Finney, OT Occupational Therapist                   Clinical Impression       Row Name 07/06/24 1519          Pain Assessment    Pretreatment Pain Rating 0/10 - no pain  at rest  -AMY     Posttreatment Pain Rating 0/10 - no pain  -AMY     Pre/Posttreatment Pain Comment minus some L hip soreness with activity per pt.  -AMY     Pain Intervention(s) Repositioned  -AMY       Row Name 07/06/24 1519          Plan of Care Review    Plan of Care Reviewed With patient  -AMY     Progress no change  -     Outcome Evaluation Patient presents with impaired activity tolerance, strength, balance and ROM along with L hip pain impacting PLOF ADLs.  Pt. is appropriate for skilled OT services to promote return to PLOF.  Due to patient not being far from baseline recommend home with 24/7 assist with home OT/PT services at discharge for home safety check and to further address deficit areas pending progress.  Elevated BP post session.  Nursing alerted.  -AMY       Row Name 07/06/24 1519          Therapy Assessment/Plan (OT)    Patient/Family Therapy Goal Statement (OT) be able to return home to PLOF ADLs  -     Rehab Potential (OT) good, to achieve stated therapy goals  -     Criteria for Skilled Therapeutic Interventions Met (OT) yes;meets criteria  -     Therapy Frequency (OT) daily  -AMY       Row Name 07/06/24 1519          Therapy Plan Review/Discharge Plan (OT)    Equipment Needs Upon Discharge (OT) other (see comments)  TBD when family present to report actual bathroom setup and AD  -AMY     Anticipated Discharge Disposition (OT) home with 24/7 care;home with home health  -AMY       Row Name 07/06/24 1519          Vital Signs    Post Systolic BP Rehab 192  -AMY     Post Treatment Diastolic BP  75  -AMY     Pretreatment Heart Rate (beats/min) 67  -AMY     Posttreatment Heart Rate (beats/min) 56  -AMY     O2 Delivery Pre Treatment room air  -AMY     O2 Delivery Intra Treatment room air  -AMY     Post SpO2 (%) 96  -AMY     O2 Delivery Post Treatment room air  -AMY     Pre Patient Position Sitting  -AMY     Intra Patient Position Standing  -AMY     Post Patient Position Sitting  -AMY       Row Name 07/06/24 1519          Positioning and Restraints    Pre-Treatment Position bathroom  -AMY     Post Treatment Position chair  -AMY     In Chair notified nsg;reclined;call light within reach;encouraged to call for assist;exit alarm on;legs elevated;waffle cushion  -AMY               User Key  (r) = Recorded By, (t) = Taken By, (c) = Cosigned By      Initials Name Provider Type    Alecia Finney OT Occupational Therapist                   Outcome Measures       Row Name 07/06/24 1528          How much help from another is currently needed...    Putting on and taking off regular lower body clothing? 3  -AMY     Bathing (including washing, rinsing, and drying) 3  -AMY     Toileting (which includes using toilet bed pan or urinal) 3  -AMY     Putting on and taking off regular upper body clothing 3  -AMY     Taking care of personal grooming (such as brushing teeth) 3  -AMY     Eating meals 3  -AMY     AM-PAC 6 Clicks Score (OT) 18  -AMY       Row Name 07/06/24 1528          Functional Assessment    Outcome Measure Options AM-PAC 6 Clicks Daily Activity (OT)  -AMY               User Key  (r) = Recorded By, (t) = Taken By, (c) = Cosigned By      Initials Name Provider Type    Alecia Finney OT Occupational Therapist                    Occupational Therapy Education       Title: PT OT SLP Therapies (In Progress)       Topic: Occupational Therapy (In Progress)       Point: ADL training (Done)       Description:   Instruct learner(s) on proper safety adaptation and remediation techniques during self care or transfers.   Instruct in  proper use of assistive devices.                  Learning Progress Summary             Patient Acceptance, E, VU by AMY at 7/6/2024 1528    Comment: reason for consult, evaluation results, transfer safety                         Point: Home exercise program (Not Started)       Description:   Instruct learner(s) on appropriate technique for monitoring, assisting and/or progressing therapeutic exercises/activities.                  Learner Progress:  Not documented in this visit.              Point: Precautions (Done)       Description:   Instruct learner(s) on prescribed precautions during self-care and functional transfers.                  Learning Progress Summary             Patient Acceptance, E, VU by AMY at 7/6/2024 1528    Comment: reason for consult, evaluation results, transfer safety                         Point: Body mechanics (Not Started)       Description:   Instruct learner(s) on proper positioning and spine alignment during self-care, functional mobility activities and/or exercises.                  Learner Progress:  Not documented in this visit.                              User Key       Initials Effective Dates Name Provider Type Discipline     07/11/23 -  Alecia Martell, OT Occupational Therapist OT                  OT Recommendation and Plan  Planned Therapy Interventions (OT): activity tolerance training, BADL retraining, cognitive/visual perception retraining, functional balance retraining, occupation/activity based interventions, patient/caregiver education/training, ROM/therapeutic exercise  Therapy Frequency (OT): daily  Plan of Care Review  Plan of Care Reviewed With: patient  Progress: no change  Outcome Evaluation: Patient presents with impaired activity tolerance, strength, balance and ROM along with L hip pain impacting PLOF ADLs.  Pt. is appropriate for skilled OT services to promote return to PLOF.  Due to patient not being far from baseline recommend home with 24/7 assist with  home OT/PT services at discharge for home safety check and to further address deficit areas pending progress.  Elevated BP post session.  Nursing alerted.     Time Calculation:   Evaluation Complexity (OT)  Review Occupational Profile/Medical/Therapy History Complexity: brief/low complexity  Assessment, Occupational Performance/Identification of Deficit Complexity: 1-3 performance deficits  Clinical Decision Making Complexity (OT): problem focused assessment/low complexity  Overall Complexity of Evaluation (OT): low complexity     Time Calculation- OT       Row Name 07/06/24 1530             Time Calculation- OT    OT Start Time 1332  -AMY      OT Received On 07/06/24  -AMY      OT Goal Re-Cert Due Date 07/16/24  -AMY         Untimed Charges    OT Eval/Re-eval Minutes 56  -AMY         Total Minutes    Untimed Charges Total Minutes 56  -AMY       Total Minutes 56  -AMY                User Key  (r) = Recorded By, (t) = Taken By, (c) = Cosigned By      Initials Name Provider Type    Alecia Finney, OT Occupational Therapist                  Therapy Charges for Today       Code Description Service Date Service Provider Modifiers Qty    12073961881 HC OT EVAL LOW COMPLEXITY 4 7/6/2024 Alecia Martell OT GO 1                 Alecia Martell OT  7/6/2024

## 2024-07-06 NOTE — PROGRESS NOTES
Subjective:     Encounter Date:07/05/2024      Patient ID: Ninoska Gloria is a 89 y.o. female.    Chief Complaint:  Chest Pain     Update 7/6/24  No chest pain overnight.  No complaints.      Review of Systems   Cardiovascular:  Positive for chest pain.       Procedures       Objective:     Constitutional:       Appearance: Healthy appearance. Not in distress.   Neck:      Vascular: No JVR. JVD normal.   Pulmonary:      Effort: Pulmonary effort is normal.      Breath sounds: Normal breath sounds. No wheezing. No rhonchi. No rales.   Chest:      Chest wall: Not tender to palpatation.   Cardiovascular:      PMI at left midclavicular line. Normal rate. Regular rhythm. Normal S1. Normal S2.       Murmurs: There is no murmur.      No gallop.  No click. No rub.   Abdominal:      Palpations: Abdomen is soft.      Tenderness: There is no abdominal tenderness.   Musculoskeletal: Normal range of motion.         General: No tenderness. Skin:     General: Skin is warm and dry.   Neurological:      General: No focal deficit present.      Mental Status: Alert and oriented to person, place and time.         Lab Review:       Assessment:       -NSTEMI, hx of MI in 1996 s/p TPA, PCI to RCA and LAD in 1996  -Pulmonary HTN  -Afib/flutter, no long term anticoagulation secondary to fass  -HTN  -HLD  -DM  -CKD  -hypothyroidism  -Hx of retinal detachment  -KALPANA  -echo, vj served LV function, EKG NSR         Plan:       -lovenox 1mg/kg (for 48 hours) BID, aspirin, statin.  Tolerating conservative treatment.    -nitroglycerin drip, will likely convert to orals tomorrow  -BB  -likely start ARB tomorrow

## 2024-07-06 NOTE — PLAN OF CARE
Goal Outcome Evaluation:      PT progressing toward goal. No needs voiced at this time. Family at bsd. No c/o pain or discomfort. Assisted to bathroom and back to bed. Call bell in reach and PT verbalized understanding of use.

## 2024-07-07 PROBLEM — I21.4 NSTEMI (NON-ST ELEVATED MYOCARDIAL INFARCTION): Status: ACTIVE | Noted: 2024-07-07

## 2024-07-07 LAB
ANION GAP SERPL CALCULATED.3IONS-SCNC: 9 MMOL/L (ref 5–15)
BUN SERPL-MCNC: 29 MG/DL (ref 8–23)
BUN/CREAT SERPL: 22.3 (ref 7–25)
CALCIUM SPEC-SCNC: 9.2 MG/DL (ref 8.2–9.6)
CHLORIDE SERPL-SCNC: 103 MMOL/L (ref 98–107)
CO2 SERPL-SCNC: 26 MMOL/L (ref 22–29)
CREAT SERPL-MCNC: 1.3 MG/DL (ref 0.57–1)
EGFRCR SERPLBLD CKD-EPI 2021: 38.4 ML/MIN/1.73
GLUCOSE BLDC GLUCOMTR-MCNC: 140 MG/DL (ref 70–130)
GLUCOSE BLDC GLUCOMTR-MCNC: 159 MG/DL (ref 70–130)
GLUCOSE BLDC GLUCOMTR-MCNC: 165 MG/DL (ref 70–130)
GLUCOSE BLDC GLUCOMTR-MCNC: 81 MG/DL (ref 70–130)
GLUCOSE SERPL-MCNC: 76 MG/DL (ref 65–99)
POTASSIUM SERPL-SCNC: 4.3 MMOL/L (ref 3.5–5.2)
SODIUM SERPL-SCNC: 138 MMOL/L (ref 136–145)

## 2024-07-07 PROCEDURE — 63710000001 INSULIN LISPRO (HUMAN) PER 5 UNITS: Performed by: NURSE PRACTITIONER

## 2024-07-07 PROCEDURE — 82948 REAGENT STRIP/BLOOD GLUCOSE: CPT

## 2024-07-07 PROCEDURE — 97116 GAIT TRAINING THERAPY: CPT

## 2024-07-07 PROCEDURE — 97161 PT EVAL LOW COMPLEX 20 MIN: CPT

## 2024-07-07 PROCEDURE — 80048 BASIC METABOLIC PNL TOTAL CA: CPT | Performed by: INTERNAL MEDICINE

## 2024-07-07 PROCEDURE — 99232 SBSQ HOSP IP/OBS MODERATE 35: CPT | Performed by: INTERNAL MEDICINE

## 2024-07-07 PROCEDURE — 63710000001 INSULIN GLARGINE PER 5 UNITS: Performed by: INTERNAL MEDICINE

## 2024-07-07 RX ORDER — VALSARTAN 80 MG/1
40 TABLET ORAL
Status: DISCONTINUED | OUTPATIENT
Start: 2024-07-07 | End: 2024-07-08 | Stop reason: HOSPADM

## 2024-07-07 RX ORDER — ISOSORBIDE MONONITRATE 60 MG/1
60 TABLET, EXTENDED RELEASE ORAL
Status: DISCONTINUED | OUTPATIENT
Start: 2024-07-07 | End: 2024-07-08 | Stop reason: HOSPADM

## 2024-07-07 RX ORDER — DILTIAZEM HYDROCHLORIDE 240 MG/1
240 CAPSULE, COATED, EXTENDED RELEASE ORAL
Status: DISCONTINUED | OUTPATIENT
Start: 2024-07-07 | End: 2024-07-08

## 2024-07-07 RX ADMIN — CEFDINIR 300 MG: 300 CAPSULE ORAL at 09:09

## 2024-07-07 RX ADMIN — Medication 10 ML: at 20:10

## 2024-07-07 RX ADMIN — INSULIN LISPRO 2 UNITS: 100 INJECTION, SOLUTION INTRAVENOUS; SUBCUTANEOUS at 22:07

## 2024-07-07 RX ADMIN — INSULIN GLARGINE 20 UNITS: 100 INJECTION, SOLUTION SUBCUTANEOUS at 09:09

## 2024-07-07 RX ADMIN — HYDRALAZINE HYDROCHLORIDE 50 MG: 50 TABLET ORAL at 09:08

## 2024-07-07 RX ADMIN — CYANOCOBALAMIN TAB 1000 MCG 1000 MCG: 1000 TAB at 09:08

## 2024-07-07 RX ADMIN — Medication 1 TABLET: at 09:08

## 2024-07-07 RX ADMIN — VALSARTAN 40 MG: 80 TABLET, FILM COATED ORAL at 09:08

## 2024-07-07 RX ADMIN — PANTOPRAZOLE SODIUM 40 MG: 40 TABLET, DELAYED RELEASE ORAL at 05:15

## 2024-07-07 RX ADMIN — LEVOTHYROXINE SODIUM 88 MCG: 88 TABLET ORAL at 05:15

## 2024-07-07 RX ADMIN — DILTIAZEM HYDROCHLORIDE 240 MG: 240 CAPSULE, COATED, EXTENDED RELEASE ORAL at 09:08

## 2024-07-07 RX ADMIN — ATORVASTATIN CALCIUM 40 MG: 40 TABLET, FILM COATED ORAL at 20:08

## 2024-07-07 RX ADMIN — INSULIN LISPRO 2 UNITS: 100 INJECTION, SOLUTION INTRAVENOUS; SUBCUTANEOUS at 18:46

## 2024-07-07 RX ADMIN — ISOSORBIDE MONONITRATE 60 MG: 60 TABLET, EXTENDED RELEASE ORAL at 09:08

## 2024-07-07 RX ADMIN — ASPIRIN 81 MG: 81 TABLET, COATED ORAL at 09:08

## 2024-07-07 RX ADMIN — METOPROLOL TARTRATE 25 MG: 25 TABLET, FILM COATED ORAL at 09:08

## 2024-07-07 RX ADMIN — Medication 10 ML: at 09:09

## 2024-07-07 NOTE — PLAN OF CARE
Goal Outcome Evaluation:  Plan of Care Reviewed With: patient, family           Outcome Evaluation: PT eval complete. Pt presents with mild balance deficits, weakness, and decreased activity tolerance below baseline warranting skilled IP PT services. Pt amb 50' w/ FWW CGA. Pt will benefit from skilled IP PT to address impairments and return to PLOF. PT rec home w/ 24/7 assist and HHPT.      Anticipated Discharge Disposition (PT): home with 24/7 care, home with home health

## 2024-07-07 NOTE — THERAPY EVALUATION
"Patient Name: Ninoska Gloria  : 1931    MRN: [id number]                              Today's Date: 2024       Admit Date: 2024    Visit Dx:     ICD-10-CM ICD-9-CM   1. Nonspecific chest pain  R07.9 786.50     Patient Active Problem List   Diagnosis    Pneumonia of lower lobe due to infectious organism    Coronary artery disease involving native coronary artery of native heart without angina pectoris    Essential hypertension    Dyslipidemia    Type 2 diabetes mellitus without complication, with long-term current use of insulin    Paroxysmal atrial fibrillation    Cirrhosis of liver without ascites    PMB (postmenopausal bleeding)    Recurrent UTI    Stage 3b chronic kidney disease    Chronic diastolic CHF (congestive heart failure)    GERD without esophagitis    UTI (urinary tract infection)    Chest pain    NSTEMI (non-ST elevated myocardial infarction)     Past Medical History:   Diagnosis Date    Abnormal ECG     Anemia     Anxiety and depression     Arrhythmia     Atrial fibrillation     Daughter reported \"in the past\"    Back pain     Cancer     Daughter reported skin cancer removed from nose in the past    CHF (congestive heart failure)     Cirrhosis     CKD (chronic kidney disease), stage III     \"Renal failure stage 3\"    Constipation     Reported intermittent episodes    COPD (chronic obstructive pulmonary disease)     Coronary artery disease     Patient daughter reported 2 coronary stents    COVID-19 2021    Diabetes mellitus 's    Diarrhea     Reported intermittent episodes    Disease of thyroid gland     Elevated cholesterol     Full dentures     Patient's daughter advised no adhesives DOS    GERD (gastroesophageal reflux disease)     Headache     Heart murmur     High triglycerides     History of blood transfusion     Patient's daughter reported unsure but this was previously noted in patients record    History of bronchitis     History of pneumonia     [place] (hard of hearing)     " Patient's daughter reported severely [place] and that patient has hearing aids but doesn't wear them    Hyperlipidemia     Hypertension     Iron deficiency     Myocardial infarction     Patient's daughter reported placement of 2 stents    Osteoarthritis     Ovarian cyst     Pneumonia     Retinal detachment     Shingles     about to finish treatment up     SOB (shortness of breath)     Vision problems      Past Surgical History:   Procedure Laterality Date    CARDIAC CATHETERIZATION      Patient's daughter reported placement of 2 stents    CATARACT EXTRACTION Bilateral 2009     SECTION      x 5: 1948, 1950, 195, 195, 196    COLONOSCOPY      CORONARY STENT PLACEMENT      ENDOSCOPY N/A 2021    Procedure: ESOPHAGOGASTRODUODENOSCOPY WITH BIOPSY;  Surgeon: Marlon Dixon MD;  Location: [facility];  Service: Gastroenterology;  Laterality: N/A;    MOUTH SURGERY      full mouth extraction    SKIN CANCER EXCISION      TUBAL ABDOMINAL LIGATION        General Information       Row Name 24 1540          Physical Therapy Time and Intention    Document Type evaluation  -KE     Mode of Treatment physical therapy  -KE       Row Name 24 1540          General Information    Patient Profile Reviewed yes  -KE     Prior Level of Function independent:;all household mobility;min assist:;ADL's;community mobility  ind w/ rollator for household mobility; rollator vs w/c for community distances. Dtrs live w/ and assist w/ ADLs as needed  -KE     Existing Precautions/Restrictions fall  -KE     Barriers to Rehab hearing deficit;previous functional deficit  -KE       Row Name 24 1540          Living Environment    People in Home child(ildefonso), adult  daughters take turns staying w/ pt  -KE       Row Name 24 1540          Home Main Entrance    Number of Stairs, Main Entrance other (see comments)  ramp  -KE       Row Name 24 1540          Stairs Within Home,  Primary    Number of Stairs, Within Home, Primary none  -KE       Row Name 07/07/24 1540          Cognition    Orientation Status (Cognition) oriented x 3  -KE       Row Name 07/07/24 1540          Safety Issues, Functional Mobility    Safety Issues Affecting Function (Mobility) awareness of need for assistance;insight into deficits/self-awareness;safety precaution awareness;safety precautions follow-through/compliance;positioning of assistive device  -KE     Impairments Affecting Function (Mobility) balance;cognition;endurance/activity tolerance;pain;sensation/sensory awareness;strength  -KE               User Key  (r) = Recorded By, (t) = Taken By, (c) = Cosigned By      Initials Name Provider Type    Nandini Howe PT Physical Therapist                   Mobility       Row Name 07/07/24 1543          Bed Mobility    Comment, (Bed Mobility) UIC  -KE       Row Name 07/07/24 1543          Sit-Stand Transfer    Sit-Stand [place] (Transfers) contact guard  -KE     Assistive Device (Sit-Stand Transfers) walker, front-wheeled  -KE       Row Name 07/07/24 1543          Gait/Stairs (Locomotion)    [place] Level (Gait) contact guard  -KE     Assistive Device (Gait) walker, front-wheeled  -KE     Patient was able to Ambulate yes  -KE     Distance in Feet (Gait) 50  -KE     Deviations/Abnormal Patterns (Gait) bilateral deviations;stride length decreased;gait speed decreased  -KE     Right Sided Gait Deviations weight shift ability decreased  -KE     Comment, (Gait/Stairs) Pt amb 50' w/ FWW, CGA. Pt demo step through gait pattern with slow gait speed. Noted decreased weight shift ability of R LE d/t R hip pain; first few steps antalgic however this improved with further distances. Further mobility limited by fatigue.  -KE               User Key  (r) = Recorded By, (t) = Taken By, (c) = Cosigned By      Initials Name Provider Type    Nandini Howe PT Physical Therapist                   Obj/Interventions        Row Name 07/07/24 1545          Range of Motion Comprehensive    General Range of Motion bilateral lower extremity ROM WFL  -KE       Row Name 07/07/24 1545          Strength Comprehensive (MMT)    General Manual Muscle Testing (MMT) Assessment lower extremity strength deficits identified  -KE     Comment, General Manual Muscle Testing (MMT) Assessment B LEs grossly 4+/5  -KE       Row Name 07/07/24 1545          Balance    Balance Assessment sitting static balance;sitting dynamic balance;sit to stand dynamic balance;standing static balance;standing dynamic balance  -KE     Static Sitting Balance standby assist  -KE     Dynamic Sitting Balance standby assist  -KE     Position, Sitting Balance sitting in chair  -KE     Sit to Stand Dynamic Balance contact guard  -KE     Static Standing Balance standby assist  -KE     Dynamic Standing Balance contact guard  -KE     Position/Device Used, Standing Balance supported;walker, front-wheeled  -KE     Balance Interventions sitting;standing;sit to stand;supported;static;dynamic  -KE     Comment, Balance no overt LOB noted  -KE       Row Name 07/07/24 1545          Sensory Assessment (Somatosensory)    Sensory Assessment (Somatosensory) LE sensation intact  -               User Key  (r) = Recorded By, (t) = Taken By, (c) = Cosigned By      Initials Name Provider Type    Nandini Howe, PT Physical Therapist                   Goals/Plan       Row Name 07/07/24 1552          Bed Mobility Goal 1 (PT)    Activity/Assistive Device (Bed Mobility Goal 1, PT) sit to supine/supine to sit  -KE     [place] Level/Cues Needed (Bed Mobility Goal 1, PT) independent  -KE     Time Frame (Bed Mobility Goal 1, PT) short term goal (STG);3 days  -KE     Progress/Outcomes (Bed Mobility Goal 1, PT) new goal  -KE       Row Name 07/07/24 1552          Transfer Goal 1 (PT)    Activity/Assistive Device (Transfer Goal 1, PT) sit-to-stand/stand-to-sit;bed-to-chair/chair-to-bed  -KE      [place] Level/Cues Needed (Transfer Goal 1, PT) independent  -KE     Time Frame (Transfer Goal 1, PT) long term goal (LTG);10 days  -KE     Progress/Outcome (Transfer Goal 1, PT) new goal  -KE       Row Name 07/07/24 1552          Gait Training Goal 1 (PT)    Activity/Assistive Device (Gait Training Goal 1, PT) gait (walking locomotion);assistive device use;increase endurance/gait distance;decrease fall risk  -KE     [place] Level (Gait Training Goal 1, PT) modified independence  -KE     Distance (Gait Training Goal 1, PT) 150'  -KE     Time Frame (Gait Training Goal 1, PT) long term goal (LTG);10 days  -KE     Progress/Outcome (Gait Training Goal 1, PT) new goal  -KE       Row Name 07/07/24 9368          Therapy Assessment/Plan (PT)    Planned Therapy Interventions (PT) balance training;bed mobility training;neuromuscular re-education;home exercise program;gait training;postural re-education;patient/family education;transfer training;stretching;strengthening;stair training;ROM (range of motion)  -KE               User Key  (r) = Recorded By, (t) = Taken By, (c) = Cosigned By      Initials Name Provider Type    Nandini Howe, PT Physical Therapist                   Clinical Impression       Row Name 07/07/24 2927          Pain    Pretreatment Pain Rating 0/10 - no pain  -KE     Posttreatment Pain Rating 0/10 - no pain  -KE     Pre/Posttreatment Pain Comment denies pain; R hip notably painful w/ gait  -KE     Pain Intervention(s) Ambulation/increased activity;Repositioned  -KE       Row Name 07/07/24 5522          Plan of Care Review    Plan of Care Reviewed With patient;family  -     Outcome Evaluation PT eval complete. Pt presents with mild balance deficits, weakness, and decreased activity tolerance below baseline warranting skilled IP PT services. Pt amb 50' w/ FWW CGA. Pt will benefit from skilled IP PT to address impairments and return to PLOF. PT rec home w/ 24/7 assist and HHPT.  -GISELLE        Row Name 07/07/24 1547          Therapy Assessment/Plan (PT)    Patient/Family Therapy Goals Statement (PT) to go home  -KE     Rehab Potential (PT) good, to achieve stated therapy goals  -KE     Criteria for Skilled Interventions Met (PT) yes;skilled treatment is necessary;meets criteria  -KE     Therapy Frequency (PT) daily  -KE     Predicted Duration of Therapy Intervention (PT) 5 days  -KE       Row Name 07/07/24 1547          Vital Signs    Pretreatment Heart Rate (beats/min) 55  -KE     Intratreatment Heart Rate (beats/min) 69  -KE     Posttreatment Heart Rate (beats/min) 64  -KE     O2 Delivery Pre Treatment room air  -KE     O2 Delivery Intra Treatment room air  -KE     O2 Delivery Post Treatment room air  -KE     Pre Patient Position Sitting  -KE     Intra Patient Position Standing  -KE     Post Patient Position Sitting  -KE       Row Name 07/07/24 1547          Positioning and Restraints    Pre-Treatment Position sitting in chair/recliner  -KE     Post Treatment Position chair  -KE     In Chair notified nsg;reclined;waffle cushion;with family/caregiver;exit alarm on;legs elevated;encouraged to call for assist;call light within reach  -KE               User Key  (r) = Recorded By, (t) = Taken By, (c) = Cosigned By      Initials Name Provider Type    Nandini Howe, PT Physical Therapist                   Outcome Measures       Row Name 07/07/24 1553 07/07/24 0800       How much help from another person do you currently need...    Turning from your back to your side while in flat bed without using bedrails? 4  -KE 4  -DA    Moving from lying on back to sitting on the side of a flat bed without bedrails? 3  -KE 3  -DA    Moving to and from a bed to a chair (including a wheelchair)? 3  -KE 3  -DA    Standing up from a chair using your arms (e.g., wheelchair, bedside chair)? 3  -KE 3  -DA    Climbing 3-5 steps with a railing? 3  -KE 2  -DA    To walk in hospital room? 3  -KE 3  -DA    AM-PAC 6 Clicks Score  (PT) 19  -KE 18  -DA    Highest Level of Mobility Goal 6 --> Walk 10 steps or more  -GISELLE 6 --> Walk 10 steps or more  -DA      Row Name 07/07/24 1553          Functional Assessment    Outcome Measure Options AM-PAC 6 Clicks Basic Mobility (PT)  -GISELLE               User Key  (r) = Recorded By, (t) = Taken By, (c) = Cosigned By      Initials Name Provider Type    [place], RN Registered Nurse    Nandini Howe PT Physical Therapist                                 Physical Therapy Education       Title: PT OT SLP Therapies (In Progress)       Topic: Physical Therapy (In Progress)       Point: Mobility training (In Progress)       Learning Progress Summary             Patient Acceptance, E, NR by GISELLE at 7/7/2024 1455                         Point: Home exercise program (Not Started)       Learner Progress:  Not documented in this visit.              Point: Body mechanics (In Progress)       Learning Progress Summary             Patient Acceptance, E, NR by GISELLE at 7/7/2024 1455                         Point: Precautions (In Progress)       Learning Progress Summary             Patient Acceptance, E, NR by GISELLE at 7/7/2024 1455                                         User Key       Initials Effective Dates Name Provider Type Stefani DESAI 11/16/23 -  Nandini Murdock, PAZ Physical Therapist PT                  PT Recommendation and Plan  Planned Therapy Interventions (PT): balance training, bed mobility training, neuromuscular re-education, home exercise program, gait training, postural re-education, patient/family education, transfer training, stretching, strengthening, stair training, ROM (range of motion)  Plan of Care Reviewed With: patient, family  Outcome Evaluation: PT eval complete. Pt presents with mild balance deficits, weakness, and decreased activity tolerance below baseline warranting skilled IP PT services. Pt amb 50' w/ FWW CGA. Pt will benefit from skilled IP PT to address impairments and  return to PLOF. PT rec home w/ 24/7 assist and HHPT.     Time Calculation:   PT Evaluation Complexity  History, PT Evaluation Complexity: 1-2 personal factors and/or comorbidities  Examination of Body Systems (PT Eval Complexity): total of 3 or more elements  Clinical Presentation (PT Evaluation Complexity): stable  Clinical Decision Making (PT Evaluation Complexity): low complexity  Overall Complexity (PT Evaluation Complexity): low complexity     PT Charges       Row Name 07/07/24 1553             Time Calculation    Start Time 1455  -KE      PT Received On 07/07/24  -KE      PT Goal Re-Cert Due Date 07/17/24  -KE         Timed Charges    41090 - Gait Training Minutes  8  -KE         Untimed Charges    PT Eval/Re-eval Minutes 34  -KE         Total Minutes    Timed Charges Total Minutes 8  -KE      Untimed Charges Total Minutes 34  -KE       Total Minutes 42  -KE                User Key  (r) = Recorded By, (t) = Taken By, (c) = Cosigned By      Initials Name Provider Type    Nandini Howe, PT Physical Therapist                  Therapy Charges for Today       Code Description Service Date Service Provider Modifiers Qty    77520518070 HC GAIT TRAINING EA 15 MIN 7/7/2024 Nandini Murdock, PT GP 1    [id number] HC PT EVAL LOW COMPLEXITY 3 7/7/2024 Nandini Murdock, PT GP 1            PT G-Codes  Outcome Measure Options: AM-PAC 6 Clicks Basic Mobility (PT)  AM-PAC 6 Clicks Score (PT): 19  AM-PAC 6 Clicks Score (OT): 18  PT Discharge Summary  Anticipated Discharge Disposition (PT): home with 24/7 care, home with home health    Nandini Murdock PT  7/7/2024

## 2024-07-07 NOTE — PLAN OF CARE
Goal Outcome Evaluation:      PT progressing towards goals. Nitro gtt infusing per provider order. No c/o pain or discomfort. No s/s of acute distress. Call bell in reach and PT verbalized understanding of use.

## 2024-07-07 NOTE — PAYOR COMM NOTE
"Ninoska Gloria (89 y.o. Female)       Date of Birth   1931    Social Security Number       Address   [address] [address]    Home Phone   [phone]    MRN   [id number]       Sikhism   Nondenominational of [place]    Marital Status                               Admission Date   24    Admission Type   Emergency    Admitting Provider   Janet Correa DO    Attending Provider   Jose Robertson DO    Department, Room/Bed   Psychiatric 6A, N607/1       Discharge Date       Discharge Disposition       Discharge Destination                                 Attending Provider: Jose Robertson DO    Allergies: No Known Allergies    Isolation: None   Infection: None   Code Status: CPR    Ht: 149.9 cm (59\")   Wt: 55.1 kg (121 lb 7.6 oz)    Admission Cmt: None   Principal Problem: Chest pain [R07.9]                   Active Insurance as of 2024       Primary Coverage       Payor Plan Insurance Group Employer/Plan Group    ANTHEM MEDICARE REPLACEMENT ANTHEM MEDICARE ADVANTAGE [id number]       Payor Plan Address Payor Plan Phone Number Payor Plan Fax Number Effective Dates    [address] [phone]  2019 - None Entered    [facility] [address]         Subscriber Name Subscriber Birth Date Member ID       NINOSKA GLORIA 1931 [id number]                     Emergency Contacts        (Rel.) Home Phone Work Phone Mobile Phone    Ira Morales (Daughter) [phone] -- [phone]    massimo valero (Daughter) [phone] -- --                 History & Physical        Sadie Ley APRN at 24 1605       Attestation signed by Janet Correa DO at 24 1705    I have reviewed this documentation and agree. Patient evaluated independently by APC. I was available for questions.                       [facility] Medicine Services  HISTORY AND PHYSICAL    Patient Name: Ninoska Gloria  : " "2/8/1931  MRN: [id number]  Primary Care Physician: Lizette Fitzpatrick PA  Date of admission: 7/5/2024    Subjective  Subjective     Chief Complaint:  Chest Pain     HPI:  Ninoska Gloria is a 89 y.o. female with past medical history significant for CAD, HTN, HLD, A-fib (not on anticoagulations,?  CHF, chronic O2 2 LNC, memory loss/mild dementia, DM type II, CKD 3, frequent UTIs, and ACUNA cirrhosis.  Patient is followed by Dr. Peoples with [facility] cardiology.  She awoke approx 1:30 AM with mid to lower chest pain relieved by nitroglycerin.  Awoke again at 5 AM with recurrent chest pain partially relieved by nitroglycerin.  Persistent chest pain and took 1 additional dose of nitroglycerin which really did not help.  Unable to accurately numerically rate her chest pain but states it was \"really bad\" early this morning and now it is mild.  Presents to [facility] ER for eval.  Of note patient also was seen here 7/1/2024 in ER and diagnosed with UTI.    HS troponin 67, BNP 3885, respiratory viral panel negative.  CXR no acute finding.  BP mildly elevated but improved at rest.  Seen by cardiology in ER.  They have started patient on Lasix x 1, nitroglycerin drip, Lovenox every 12 hrs SQ, and ordered echo.  On my exam patient complaining her pain is mostly in her stomach and she feels it is burning.  Agreeable to try GI cocktail.  Continue medications per cardiology orders.  Admit to hospital medicine service and cardiology to follow.      Review of Systems   Constitutional:  Positive for activity change and fatigue. Negative for chills, diaphoresis and fever.   HENT:  Negative for congestion, trouble swallowing and voice change.    Eyes: Negative.    Respiratory:  Positive for chest tightness. Negative for shortness of breath and wheezing.    Cardiovascular:  Positive for chest pain. Negative for palpitations and leg swelling.   Gastrointestinal: Negative.    Endocrine: Negative.    Genitourinary:  Negative for difficulty " "urinating, dysuria, flank pain, hematuria and urgency.   Musculoskeletal:  Positive for arthralgias and gait problem.   Skin:  Positive for pallor.   Allergic/Immunologic: Negative.    Neurological:  Negative for dizziness, speech difficulty, light-headedness and headaches.   Hematological: Negative.    Psychiatric/Behavioral: Negative.          Personal History     Past Medical History:   Diagnosis Date    Abnormal ECG     Anemia     Anxiety and depression     Arrhythmia     Atrial fibrillation     Daughter reported \"in the past\"    Back pain     Cancer     Daughter reported skin cancer removed from nose in the past    CHF (congestive heart failure)     Cirrhosis     CKD (chronic kidney disease), stage III     \"Renal failure stage 3\"    Constipation     Reported intermittent episodes    COPD (chronic obstructive pulmonary disease)     Coronary artery disease     Patient daughter reported 2 coronary stents    COVID-19 09/2021    Diabetes mellitus 1990's    Diarrhea     Reported intermittent episodes    Disease of thyroid gland     Elevated cholesterol     Full dentures     Patient's daughter advised no adhesives DOS    GERD (gastroesophageal reflux disease)     Headache     Heart murmur     High triglycerides     History of blood transfusion     Patient's daughter reported unsure but this was previously noted in patients record    History of bronchitis     History of pneumonia     Sycuan (hard of hearing)     Patient's daughter reported severely Sycuan and that patient has hearing aids but doesn't wear them    Hyperlipidemia     Hypertension 1948    Iron deficiency 1996    Myocardial infarction 1996    Patient's daughter reported placement of 2 stents    Osteoarthritis     Ovarian cyst     Pneumonia     Retinal detachment 2009    Shingles     about to finish treatment up     SOB (shortness of breath)     Vision problems              Past Surgical History:   Procedure Laterality Date    CARDIAC CATHETERIZATION  1996    " Patient's daughter reported placement of 2 stents    CATARACT EXTRACTION Bilateral ,      SECTION      x 5: 1948, 1950, 195, ,     COLONOSCOPY      CORONARY STENT PLACEMENT      ENDOSCOPY N/A 2021    Procedure: ESOPHAGOGASTRODUODENOSCOPY WITH BIOPSY;  Surgeon: Marlon Dixon MD;  Location: [facility] ENDOSCOPY;  Service: Gastroenterology;  Laterality: N/A;    MOUTH SURGERY      full mouth extraction    SKIN CANCER EXCISION      TUBAL ABDOMINAL LIGATION         Family History:  family history includes COPD in her father and mother; Heart attack in her sister; Liver cancer in her brother; No Known Problems in her daughter, maternal grandfather, maternal grandmother, paternal grandfather, paternal grandmother, and son.     Social History:  reports that she has never smoked. She has never been exposed to tobacco smoke. She has never used smokeless tobacco. She reports that she does not drink alcohol and does not use drugs.  Social History     Social History Narrative    .  Lives in her home and her adult daughters rotate staying with her around-the-clock.  Up with a walker at baseline and uses 2 LNC oxygen.       Medications:  Cranberry, Flaxseed Oil, FreeStyle Rony 3 [place], FreeStyle Rony 3 Sensor, Magnesium, acetaminophen, aspirin, cefdinir, dilTIAZem CD, fish oil, furosemide, glipizide, hydrALAZINE, insulin detemir, isosorbide mononitrate, levothyroxine, metoprolol tartrate, multivitamin with minerals, nitroglycerin, omeprazole, simvastatin, triamcinolone, and vitamin B-12    No Known Allergies    Objective  Objective     Vital Signs:   Temp:  [97.7 °F (36.5 °C)] 97.7 °F (36.5 °C)  Heart Rate:  [47-71] 71  Resp:  [18] 18  BP: (121-177)/() 149/72    Physical Exam   Constitutional: Awake, alert.  Resting back in no acute distress.  Chronically ill, frail and debilitated elderly female.  Daughter at bedside.  Eyes: PERRLA, sclerae anicteric, no conjunctival  injection  HENT: NCAT, mucous membranes moist  Neck: Supple, no thyromegaly, no lymphadenopathy, trachea midline  Respiratory: clear upper lobes, scattered fine crackles to bases.  No wheezes.  nonlabored respirations on room air with sats 95%  Cardiovascular: RRR, 2/6 systolic murmur, rubs, or gallops, palpable pedal pulses bilaterally  Gastrointestinal: Positive bowel sounds, soft, nontender, nondistended  Musculoskeletal: No bilateral ankle edema, no clubbing or cyanosis to extremities. JC spont   Psychiatric: Appropriate affect, cooperative  Neurologic: Oriented x 3 with obvious short term memory deficits, strength symmetric in all extremities but generally weak, Cranial Nerves grossly intact to confrontation, speech clear. Follows commands   Skin: No rashes      Result Review:  I have personally reviewed the results from the time of this admission to 7/5/2024 16:32 EDT and agree with these findings:  [x]  Laboratory list / accordion  []  Microbiology  [x]  Radiology  [x]  EKG/Telemetry   [x]  Cardiology/Vascular   []  Pathology  [x]  Old records  []  Other  Most notable findings include:     LAB RESULTS:      Lab 07/05/24 1323 07/01/24 1954 07/01/24  1517   WBC 8.50  --  8.15   HEMOGLOBIN 11.2*  --  10.3*   HEMATOCRIT 37.1  --  35.4   PLATELETS 249  --  235   NEUTROS ABS 6.15  --  5.91   IMMATURE GRANS (ABS) 0.03  --  0.02   LYMPHS ABS 1.39  --  1.38   MONOS ABS 0.78  --  0.78   EOS ABS 0.13  --  0.04   MCV 82.8  --  83.3   PROCALCITONIN  --   --  0.09   LACTATE  --  1.3  --          Lab 07/05/24  1323 07/01/24  1517   SODIUM 142 142   POTASSIUM 4.5 4.3   CHLORIDE 104 106   CO2 29.0 25.0   ANION GAP 9.0 11.0   BUN 29* 38*   CREATININE 1.31* 1.55*   EGFR 38.1* 31.1*   GLUCOSE 84 152*   CALCIUM 10.1* 9.3   MAGNESIUM  --  1.8   PHOSPHORUS  --  3.4   TSH  --  0.990         Lab 07/05/24  1323 07/01/24  1517   TOTAL PROTEIN 7.5 7.0   ALBUMIN 3.8 3.5   GLOBULIN 3.7 3.5   ALT (SGPT) 24 29   AST (SGOT) 37* 49*    BILIRUBIN 0.2 <0.2   ALK PHOS 124* 129*   LIPASE 36 38         Lab 07/05/24  1533 07/05/24  1323 07/01/24  1751 07/01/24  1517   PROBNP  --  3,885.0*  --  1,648.0   HSTROP T 63* 67* 82* 73*                 Brief Urine Lab Results  (Last result in the past 365 days)        Color   Clarity   Blood   Leuk Est   Nitrite   Protein   CREAT   Urine HCG        07/01/24 1629 Yellow   Clear   Negative   Small (1+)   Positive   100 mg/dL (2+)                 Microbiology Results (last 10 days)       Procedure Component Value - Date/Time    COVID PRE-OP / PRE-PROCEDURE SCREENING ORDER (NO ISOLATION) - Swab, Nasopharynx [[id number]]  (Normal) Collected: 07/05/24 1323    Lab Status: Final result Specimen: Swab from Nasopharynx Updated: 07/05/24 1424    Narrative:      The following orders were created for panel order COVID PRE-OP / PRE-PROCEDURE SCREENING ORDER (NO ISOLATION) - Swab, Nasopharynx.  Procedure                               Abnormality         Status                     ---------                               -----------         ------                     Respiratory Panel PCR w/...[[id number]]  Normal              Final result                 Please view results for these tests on the individual orders.    Respiratory Panel PCR w/COVID-19(SARS-CoV-2) ADAM/NATY/SHASTA/PAD/COR/KARLA In-House, NP Swab in UTM/VTM, 2 HR TAT - Swab, Nasopharynx [[id number]]  (Normal) Collected: 07/05/24 1323    Lab Status: Final result Specimen: Swab from Nasopharynx Updated: 07/05/24 1424     ADENOVIRUS, PCR Not Detected     Coronavirus 229E Not Detected     Coronavirus HKU1 Not Detected     Coronavirus NL63 Not Detected     Coronavirus OC43 Not Detected     COVID19 Not Detected     Human Metapneumovirus Not Detected     Human Rhinovirus/Enterovirus Not Detected     Influenza A PCR Not Detected     Influenza B PCR Not Detected     Parainfluenza Virus 1 Not Detected     Parainfluenza Virus 2 Not Detected     Parainfluenza Virus 3 Not Detected      Parainfluenza Virus 4 Not Detected     RSV, PCR Not Detected     Bordetella pertussis pcr Not Detected     Bordetella parapertussis PCR Not Detected     Chlamydophila pneumoniae PCR Not Detected     Mycoplasma pneumo by PCR Not Detected    Narrative:      In the setting of a positive respiratory panel with a viral infection PLUS a negative procalcitonin without other underlying concern for bacterial infection, consider observing off antibiotics or discontinuation of antibiotics and continue supportive care. If the respiratory panel is positive for atypical bacterial infection (Bordetella pertussis, Chlamydophila pneumoniae, or Mycoplasma pneumoniae), consider antibiotic de-escalation to target atypical bacterial infection.    Blood Culture - Blood, Arm, Right [[id number]]  (Normal) Collected: 07/01/24 1954    Lab Status: Preliminary result Specimen: Blood from Arm, Right Updated: 07/04/24 2030     Blood Culture No growth at 3 days    Blood Culture - Blood, Arm, Left [[id number]]  (Normal) Collected: 07/01/24 1954    Lab Status: Preliminary result Specimen: Blood from Arm, Left Updated: 07/04/24 2030     Blood Culture No growth at 3 days    Urine Culture - Urine, Urine, Clean Catch [[id number]]  (Abnormal)  (Susceptibility) Collected: 07/01/24 1629    Lab Status: Final result Specimen: Urine, Clean Catch Updated: 07/04/24 1206     Urine Culture 50,000 CFU/mL Escherichia coli    Narrative:      Colonization of the urinary tract without infection is common. Treatment is discouraged unless the patient is symptomatic, pregnant, or undergoing an invasive urologic procedure.    Susceptibility        Escherichia coli      MAYANK      Amoxicillin + Clavulanate Susceptible      Ampicillin Susceptible      Ampicillin + Sulbactam Susceptible      Cefazolin Susceptible      Cefepime Susceptible      Ceftazidime Susceptible      Ceftriaxone Susceptible      Gentamicin Susceptible      Levofloxacin Resistant      Nitrofurantoin  Susceptible      Piperacillin + Tazobactam Susceptible      Trimethoprim + Sulfamethoxazole Susceptible                                   XR Chest 1 View    Result Date: 7/5/2024  XR CHEST 1 VW Date of Exam: 7/5/2024 1:16 PM EDT Indication: Chest Pain Triage Protocol Comparison: 7/1/2024 Findings: Heart and pulmonary vessels appear within normal limits. Previously seen mild pulmonary edema has resolved. No acute infiltrates or effusions are identified.     Impression: Impression: No acute process. Electronically Signed: Elena Albrecht MD  7/5/2024 1:29 PM EDT  Workstation ID: [id number]     Results for orders placed during the hospital encounter of 04/16/18    Adult Transthoracic Echo Complete W/ Cont if Necessary Per Protocol    Interpretation Summary  · Left ventricular wall thickness is consistent with mild concentric hypertrophy.  · Left ventricular diastolic dysfunction (grade I a) consistent with impaired relaxation.  · There is mild calcification of the aortic valve.  · Mild mitral valve regurgitation is present  · Mild tricuspid valve regurgitation is present.  · Calculated right ventricular systolic pressure from tricuspid regurgitation is 79 mmHg.  · There is a small (<1cm) pericardial effusion.  · Calculated EF = 56%. Normal left ventricular cavity size noted. All left ventricular wall segments contract normally. asynchronous septal motion with mild hypokinesis of inferior wall Left ventricular wall thickness is consistent with mild concentric hypertrophy. Left ventricular diastolic dysfunction is noted (grade I a w/high LAP) consistent with impaired relaxation.      Assessment & Plan  Assessment & Plan       Chest pain    Coronary artery disease involving native coronary artery of native heart without angina pectoris    Essential hypertension    Dyslipidemia    Type 2 diabetes mellitus without complication, with long-term current use of insulin    Paroxysmal atrial fibrillation    Cirrhosis of liver  "without ascites    Dementia    Stage 3b chronic kidney disease    Chronic diastolic CHF (congestive heart failure)    GERD without esophagitis    UTI (urinary tract infection)      Ninoska Gloria is a 89 y.o. female with past medical history significant for CAD, HTN, HLD, A-fib (not on anticoagulations,?  CHF, chronic O2 2 LNC, memory loss/mild dementia, DM type II, CKD 3, frequent UTIs, and ACUNA cirrhosis.  Patient is followed by Dr. Peoples with [facility] cardiology.  She awoke approx 1:30 AM with mid to lower chest pain relieved by nitroglycerin.  Awoke again at 5 AM with recurrent chest pain partially relieved by nitroglycerin.  Persistent chest pain and took 1 additional dose of nitroglycerin which really did not help.  Unable to accurately numerically rate her chest pain but states it was \"really bad\" early this morning and now it is mild.  Presents to [facility] ER for eval.  Of note patient also was seen here 7/1/2024 in ER and diagnosed with UTI.    HS troponin 67, BNP 3885, respiratory viral panel negative.  CXR no acute finding.  BP mildly elevated but improved at rest.  Seen by cardiology in ER.  They have started patient on Lasix x 1, nitroglycerin drip, Lovenox every 12 hrs SQ, and ordered echo.  On my exam patient complaining her pain is mostly in her stomach and she feels it is burning.  Agreeable to try GI cocktail.  Continue medications per cardiology orders.  Admit to hospital medicine service and cardiology to follow.    Assessment/plan:    Chest pain  Hx CAD/HTN/HLD/Afib (no a/c)  ? CHF hx, BNP 3885  Chronic 2LNC use (data deficit)  -- Patient complaining of stomach and epigastric burning pain that began at 1:30 AM today.  Denies chest pain currently.  Reports pain all epigastric and stomach.  Hx reflux.  Will give one-time GI cocktail  -- Already seen by cards in ER.  Started on nitroglycerin drip, Lovenox every 12 and Lasix x 1.  Continue per their orders.  -- Continue home oxygen  -- Echo " pending  -- AM labs  -- Cardiology to follow  -- Will make n.p.o. after midnight tonight in the event cardiology chooses to do procedure.    UTI (dxd here 7/1/24)  Hx freq UTIs  --continue abx prescribed on 7/1 to complete course     DM type II   --last A1c on file from 8/2022= 6.2)  --pt takes 70 units basal insulin daily only.  Glucose currently 84.  -- Continue MD SSI and low-dose basal every morning only for now.  Further as needed.  --Check a.m. A1c to help guide possible need for dose reduction    CKD III  --Cr 1.31, appears near baseline     GERD  -- PPI    Hypothyroidism  -- Daughter denies hypothyroidism however patient takes 88 mcg of levothyroxine daily.  -- Fills every 3 months per chart review and last filled in April.  -- Check a.m. TSH, continue current thyroid dose for now    Mild memory loss/dementia  --(data deficit)  --Da reports never officially dxd as dementia.  Currently ox3 with mild STM deficits     ACUNA  --labs generally stable to her baseline     Chronic weakness/fatigue  Debility   --Lives in her home and her 2 da's take turns staying with her  --consult CM for dc planning   --PT/OT consults,  up with assist       DVT prophylaxis:  sequentials, lovenox per cards    CODE STATUS:    Level Of Support Discussed With: Patient; Next of Kin (If No Surrogate)  Code Status (Patient has no pulse and is not breathing): CPR (Attempt to Resuscitate)  Medical Interventions (Patient has pulse or is breathing): Full Support      Expected Discharge  Expected Discharge Date: 7/7/2024; Expected Discharge Time:      Signature: Electronically signed by AKBAR Correa, 07/05/24, 4:26 PM EDT                Electronically signed by Janet Correa DO at 07/05/24 1703          Physician Progress Notes (all)        Dez Webb MD at 07/07/24 0830              Subjective:     Encounter Date:07/05/2024    Subjective  Patient ID: Ninoska Gloria is a 89 y.o. female.    Chief  Complaint:  Chest Pain     Update 7/6/24  No chest pain overnight.  No complaints.      Update 7/724  No acute changes    Review of Systems   Cardiovascular:  Positive for chest pain.       Procedures      Objective:   Objective  Constitutional:       Appearance: Healthy appearance. Not in distress.   Neck:      Vascular: No JVR. JVD normal.   Pulmonary:      Effort: Pulmonary effort is normal.      Breath sounds: Normal breath sounds. No wheezing. No rhonchi. No rales.   Chest:      Chest wall: Not tender to palpatation.   Cardiovascular:      PMI at left midclavicular line. Normal rate. Regular rhythm. Normal S1. Normal S2.       Murmurs: There is no murmur.      No gallop.  No click. No rub.   Abdominal:      Palpations: Abdomen is soft.      Tenderness: There is no abdominal tenderness.   Musculoskeletal: Normal range of motion.         General: No tenderness. Skin:     General: Skin is warm and dry.   Neurological:      General: No focal deficit present.      Mental Status: Alert and oriented to person, place and time.         Lab Review:      Assessment:   Assessment    -NSTEMI, hx of MI in 1996 s/p TPA, PCI to RCA and LAD in 1996  -Pulmonary HTN  -Afib/flutter, no long term anticoagulation secondary to fass  -HTN  -HLD  -DM  -CKD  -hypothyroidism  -Hx of retinal detachment  -ACUNA  -echo, preserved LV function, EKG NSR        Plan:   Plan    -lovenox 1mg/kg (for 48 hours) daily (kidney function), aspirin, statin. Will DC lovenox today.  Tolerating conservative treatment.    -stop drip and start imdur 60 mg.    -BB  -starting valsartan today  -will see how symptomatic when gets out of bed.  PT ordered.          Electronically signed by Dez Webb MD at 07/07/24 0834       Dez Webb MD at 07/06/24 1155              Subjective:     Encounter Date:07/05/2024    Subjective  Patient ID: Ninoska Gloria is a 89 y.o. female.    Chief Complaint:  Chest Pain     Update 7/6/24  No chest pain  overnight.  No complaints.      Review of Systems   Cardiovascular:  Positive for chest pain.       Procedures      Objective:   Objective  Constitutional:       Appearance: Healthy appearance. Not in distress.   Neck:      Vascular: No JVR. JVD normal.   Pulmonary:      Effort: Pulmonary effort is normal.      Breath sounds: Normal breath sounds. No wheezing. No rhonchi. No rales.   Chest:      Chest wall: Not tender to palpatation.   Cardiovascular:      PMI at left midclavicular line. Normal rate. Regular rhythm. Normal S1. Normal S2.       Murmurs: There is no murmur.      No gallop.  No click. No rub.   Abdominal:      Palpations: Abdomen is soft.      Tenderness: There is no abdominal tenderness.   Musculoskeletal: Normal range of motion.         General: No tenderness. Skin:     General: Skin is warm and dry.   Neurological:      General: No focal deficit present.      Mental Status: Alert and oriented to person, place and time.         Lab Review:      Assessment:   Assessment    -NSTEMI, hx of MI in  s/p TPA, PCI to RCA and LAD in   -Pulmonary HTN  -Afib/flutter, no long term anticoagulation secondary to fass  -HTN  -HLD  -DM  -CKD  -hypothyroidism  -Hx of retinal detachment  -ACUNA  -echo, vj served LV function, EKG NSR        Plan:   Plan    -lovenox 1mg/kg (for 48 hours) BID, aspirin, statin.  Tolerating conservative treatment.    -nitroglycerin drip, will likely convert to orals tomorrow  -BB  -likely start ARB tomorrow        Electronically signed by Dez Webb MD at 24 1159       Jose Robertson DO at 24 0844              [facility]  PROGRESS NOTE    Patient Name: Ninoska Gloria  : 1931  MRN: [id number]    Date of Admission: 2024  Primary Care Physician: Lizette Fitzpatrick PA    Subjective   Subjective     CC:  F/u chest pain    HPI:  TTE being obtained. Chest pain feels better this morning. She is  hungry      Objective   Objective     Vital Signs:   Temp:  [97.3 °F (36.3 °C)-98.6 °F (37 °C)] 98.3 °F (36.8 °C)  Heart Rate:  [47-71] 60  Resp:  [16-18] 18  BP: (121-200)/() 180/76     Physical Exam:  Constitutional: Awake, alert, laying in bed in NAD  Respiratory: Clear to auscultation bilaterally, respiratory effort normal   Cardiovascular: RRR, palpable radial pulse  Gastrointestinal: Positive bowel sounds, soft, nontender, nondistended  Musculoskeletal: Trace LE edema  Psychiatric: Appropriate affect, cooperative  Neurologic: Hard of hearing    Results Reviewed:  LAB RESULTS:      Lab 07/06/24 0423 07/05/24 1323 07/01/24 1954 07/01/24  1517   WBC 7.03 8.50  --  8.15   HEMOGLOBIN 10.1* 11.2*  --  10.3*   HEMATOCRIT 33.7* 37.1  --  35.4   PLATELETS 209 249  --  235   NEUTROS ABS 4.03 6.15  --  5.91   IMMATURE GRANS (ABS) 0.01 0.03  --  0.02   LYMPHS ABS 1.62 1.39  --  1.38   MONOS ABS 1.15* 0.78  --  0.78   EOS ABS 0.20 0.13  --  0.04   MCV 81.6 82.8  --  83.3   PROCALCITONIN  --   --   --  0.09   LACTATE  --   --  1.3  --          Lab 07/06/24 0423 07/05/24 1323 07/01/24  1517   SODIUM 135* 142 142   POTASSIUM 4.3 4.5 4.3   CHLORIDE 99 104 106   CO2 26.0 29.0 25.0   ANION GAP 10.0 9.0 11.0   BUN 31* 29* 38*   CREATININE 1.33* 1.31* 1.55*   EGFR 37.4* 38.1* 31.1*   GLUCOSE 99 84 152*   CALCIUM 9.3 10.1* 9.3   MAGNESIUM  --   --  1.8   PHOSPHORUS  --   --  3.4   HEMOGLOBIN A1C 5.90*  --   --    TSH 1.800  --  0.990         Lab 07/05/24 1323 07/01/24  1517   TOTAL PROTEIN 7.5 7.0   ALBUMIN 3.8 3.5   GLOBULIN 3.7 3.5   ALT (SGPT) 24 29   AST (SGOT) 37* 49*   BILIRUBIN 0.2 <0.2   ALK PHOS 124* 129*   LIPASE 36 38         Lab 07/05/24  1533 07/05/24  1323 07/01/24  1751 07/01/24  1517   PROBNP  --  3,885.0*  --  1,648.0   HSTROP T 63* 67* 82* 73*         Lab 07/06/24  0423   CHOLESTEROL 180   LDL CHOL 100   HDL CHOL 37*   TRIGLYCERIDES 251*             Brief Urine Lab Results  (Last result in the past 365  days)        Color   Clarity   Blood   Leuk Est   Nitrite   Protein   CREAT   Urine HCG        07/01/24 1629 Yellow   Clear   Negative   Small (1+)   Positive   100 mg/dL (2+)                   Microbiology Results Abnormal       Procedure Component Value - Date/Time    COVID PRE-OP / PRE-PROCEDURE SCREENING ORDER (NO ISOLATION) - Swab, Nasopharynx [[id number]]  (Normal) Collected: 07/05/24 1323    Lab Status: Final result Specimen: Swab from Nasopharynx Updated: 07/05/24 1424    Narrative:      The following orders were created for panel order COVID PRE-OP / PRE-PROCEDURE SCREENING ORDER (NO ISOLATION) - Swab, Nasopharynx.  Procedure                               Abnormality         Status                     ---------                               -----------         ------                     Respiratory Panel PCR w/...[[id number]]  Normal              Final result                 Please view results for these tests on the individual orders.    Respiratory Panel PCR w/COVID-19(SARS-CoV-2) ADAM/NATY/SHASTA/PAD/COR/KARLA In-House, NP Swab in UTM/VTM, 2 HR TAT - Swab, Nasopharynx [[id number]]  (Normal) Collected: 07/05/24 1323    Lab Status: Final result Specimen: Swab from Nasopharynx Updated: 07/05/24 1424     ADENOVIRUS, PCR Not Detected     Coronavirus 229E Not Detected     Coronavirus HKU1 Not Detected     Coronavirus NL63 Not Detected     Coronavirus OC43 Not Detected     COVID19 Not Detected     Human Metapneumovirus Not Detected     Human Rhinovirus/Enterovirus Not Detected     Influenza A PCR Not Detected     Influenza B PCR Not Detected     Parainfluenza Virus 1 Not Detected     Parainfluenza Virus 2 Not Detected     Parainfluenza Virus 3 Not Detected     Parainfluenza Virus 4 Not Detected     RSV, PCR Not Detected     Bordetella pertussis pcr Not Detected     Bordetella parapertussis PCR Not Detected     Chlamydophila pneumoniae PCR Not Detected     Mycoplasma pneumo by PCR Not Detected    Narrative:      In the  setting of a positive respiratory panel with a viral infection PLUS a negative procalcitonin without other underlying concern for bacterial infection, consider observing off antibiotics or discontinuation of antibiotics and continue supportive care. If the respiratory panel is positive for atypical bacterial infection (Bordetella pertussis, Chlamydophila pneumoniae, or Mycoplasma pneumoniae), consider antibiotic de-escalation to target atypical bacterial infection.            XR Chest 1 View    Result Date: 7/5/2024  XR CHEST 1 VW Date of Exam: 7/5/2024 1:16 PM EDT Indication: Chest Pain Triage Protocol Comparison: 7/1/2024 Findings: Heart and pulmonary vessels appear within normal limits. Previously seen mild pulmonary edema has resolved. No acute infiltrates or effusions are identified.     Impression: Impression: No acute process. Electronically Signed: Elena Albrecht MD  7/5/2024 1:29 PM EDT  Workstation ID: [id number]     Results for orders placed during the hospital encounter of 04/16/18    Adult Transthoracic Echo Complete W/ Cont if Necessary Per Protocol    Interpretation Summary  · Left ventricular wall thickness is consistent with mild concentric hypertrophy.  · Left ventricular diastolic dysfunction (grade I a) consistent with impaired relaxation.  · There is mild calcification of the aortic valve.  · Mild mitral valve regurgitation is present  · Mild tricuspid valve regurgitation is present.  · Calculated right ventricular systolic pressure from tricuspid regurgitation is 79 mmHg.  · There is a small (<1cm) pericardial effusion.  · Calculated EF = 56%. Normal left ventricular cavity size noted. All left ventricular wall segments contract normally. asynchronous septal motion with mild hypokinesis of inferior wall Left ventricular wall thickness is consistent with mild concentric hypertrophy. Left ventricular diastolic dysfunction is noted (grade I a w/high LAP) consistent with impaired  relaxation.      Current medications:  Scheduled Meds:aspirin, 81 mg, Oral, Daily  atorvastatin, 40 mg, Oral, Nightly  [START ON 7/7/2024] cefdinir, 300 mg, Oral, Q24H  enoxaparin, 1 mg/kg, Subcutaneous, Q24H  insulin glargine, 25 Units, Subcutaneous, Daily  insulin lispro, 2-9 Units, Subcutaneous, 4x Daily AC & at Bedtime  levothyroxine, 88 mcg, Oral, Daily  metoprolol tartrate, 25 mg, Oral, BID  multivitamin with minerals, 1 tablet, Oral, Daily  pantoprazole, 40 mg, Oral, Q AM  sodium chloride, 10 mL, Intravenous, Q12H  vitamin B-12, 1,000 mcg, Oral, Once per day on Sunday Tuesday Thursday Saturday      Continuous Infusions:nitroglycerin, 10-50 mcg/min, Last Rate: 15 mcg/min (07/06/24 0548)      PRN Meds:.  acetaminophen    senna-docusate sodium **AND** polyethylene glycol **AND** bisacodyl **AND** bisacodyl    dextrose    dextrose    glucagon (human recombinant)    nitroglycerin    sodium chloride    sodium chloride    sodium chloride    Assessment & Plan   Assessment & Plan     Active Hospital Problems    Diagnosis  POA    **Chest pain [R07.9]  Yes    Stage 3b chronic kidney disease [N18.32]  Yes    Chronic diastolic CHF (congestive heart failure) [I50.32]  Yes    GERD without esophagitis [K21.9]  Yes    Cirrhosis of liver without ascites [K74.60]  Yes    Coronary artery disease involving native coronary artery of native heart without angina pectoris [I25.10]  Yes    Essential hypertension [I10]  Yes    Dyslipidemia [E78.5]  Yes    Type 2 diabetes mellitus without complication, with long-term current use of insulin [E11.9, Z79.4]  Not Applicable    Paroxysmal atrial fibrillation [I48.0]  Yes      Resolved Hospital Problems   No resolved problems to display.        Brief Hospital Course to date:  Ninoska Gloria is a 89 y.o. female w/ CAD, HTN, HLD, Afib not on AC, HFpEF, DM2, CKD3, ACUNA cirrhosis, who presented w/ acute onset chest/epigastric pain initially improved w/ nitro then refractory to the same; troponin  was minimally elevated on arrival and level on recheck; cardiology recommended medical management    The following problems are new to me today    Assessment/Plan    Acute chest/epigastric pain  CAD  HTN  HLD  Afib not on oral AC  Chronic HFpEF, compensated  Chronic O2 use  -seen by cardiology, rec'd medical management w/ nitro gtt and Tx dose lovenox  -repeat TTE pending  -cont asa, statin, lopressor  -cards following  -pain improved this AM    Abnormal UA  -seen in the ED 7/1 for generalized weakness, Dx'ed w/ UTI and Rx'ed oral Abx; Cx is 50k CFU E Coli  -doubt clinically significant UTI, to avoid partial Tx will cont cefdinir as prev Rx    DM type 2, A1c 5.9%, w/ insulin use  -lantus w/ SSI    Cirrhosis w/o known ascites  CKD stage 3 - baseline 1.1-1.6; eGFR 30's  GERD - ppi  Hypothyroidism - levothyroxine  Dementia    *Patient lives w/ family who help assist with her care       Expected Discharge Location and Transportation: tbd pending clinical course, potentially home w/ family  Expected Discharge   Expected Discharge Date: 7/7/2024; Expected Discharge Time:      VTE Prophylaxis:  Pharmacologic & mechanical VTE prophylaxis orders are present.         AM-PAC 6 Clicks Score (PT): 18 (07/05/24 2057)    CODE STATUS:   Code Status and Medical Interventions:   Ordered at: 07/05/24 1625     Level Of Support Discussed With:    Patient    Next of Kin (If No Surrogate)     Code Status (Patient has no pulse and is not breathing):    CPR (Attempt to Resuscitate)     Medical Interventions (Patient has pulse or is breathing):    Full Support       Jose Robertson DO  07/06/24        Electronically signed by Jose Robertson DO at 07/06/24 1153          Consult Notes (all)        Gustavo Vazquez MD at 07/05/24 1620          [facility] Cardiology  Consultation History and Physical  Ninoska Thomaslourdes  2/8/1931      PCP:   Lizette Fitzpatrick PA        Date of  Consultation: 7/5/2024 16:20 EDT      Reason for Consultation: Chest pain:      Cardiology problem list:  CAD  Inferior MI 1996 s/p tpa  stent to RCA and LAD 1996 Dr. Velasquez  stress Echo 2006 - WNL  Echo 8/2012 - normal LV function, no significant valvular abnormalities  8/16/13 MPS: Abnormal large sized severely fixed inferior defect, moderate size moderately fixed lateral defect with no reversibility, EF 45%  Pulmonary hypertension  4/17/18 echo: EF 56%, mild concentric LVH, grade 1 diastolic dysfunction, mild calcification of the aortic valve, mild MR, mild TR, RVSP 79 mmHg, small pericardial effusion  CT chest - 2020 bronchiectasis  A-fib/flutter  [id number] 6 no AC due to falls  Hypertension  Dyslipidemia  DM 2, on insulin  4/19/18 A1c 8.7  CKD  7/23 1.4 Dr Gonzalez  Hypothyroidism  GERD  History of retinal detachment  Frequent falls  Diverticulosis  ACUNA  Cholelithiasis  Surgical history  5 c section     History of Present Illness:  Ninoska Gloria  Is a 89 y.o. female with medical history detailed above.  She was actually here in the ER on Monday with some generalized weakness found to have a UTI for which she is currently on antibiotics.  She states she was having some chest pain at that time.  Troponins on Monday were 73 and 82.  She states starting last night she again had fairly severe central chest pain felt like someone sitting on her chest.  Last night and this morning she has taken a total of 4 sublingual nitroglycerin with improvement but without resolution of the chest pain.  Troponins here today are lower than Monday 67, 63.  EKG shows a chronic inferior infarct but no new ST changes.  There are frequent PVCs.  Patient also states she is having a hard time breathing.  proBNP is 3885.  She does have some bibasilar crackles.  Chest x-ray today actually looks improved compared with 7/1.  Cardiology was consulted for further recommendations.  She has a history of PCI in 1996.  Last stress test showed a large inferior defect and a  moderate lateral defect.  It has been sometime since her last cardiac testing.  She has been taking Imdur 30 mg daily at home for stable angina      Patient Active Problem List    Diagnosis Date Noted    *Chest pain 07/05/2024    Recurrent UTI 08/19/2022    Dementia 08/19/2022    Stage 3b chronic kidney disease 08/19/2022    Chronic diastolic CHF (congestive heart failure) 08/19/2022    GERD without esophagitis 08/19/2022    UTI (urinary tract infection) 08/19/2022    PMB (postmenopausal bleeding) 03/19/2021     Note Last Updated: 3/19/2021     Added automatically from request for surgery [id number]      Cirrhosis of liver without ascites 01/15/2021     Note Last Updated: 1/15/2021     Added automatically from request for surgery [id number]      Coronary artery disease involving native coronary artery of native heart without angina pectoris 05/24/2018    Essential hypertension 05/24/2018    Dyslipidemia 05/24/2018    Type 2 diabetes mellitus without complication, with long-term current use of insulin 05/24/2018    Paroxysmal atrial fibrillation 05/24/2018    Pneumonia of lower lobe due to infectious organism 04/16/2018       No Known Allergies    Social History     Socioeconomic History    Marital status:    Tobacco Use    Smoking status: Never     Passive exposure: Never    Smokeless tobacco: Never   Vaping Use    Vaping status: Never Used   Substance and Sexual Activity    Alcohol use: No    Drug use: No    Sexual activity: Not Currently     Partners: Male     Birth control/protection: Surgical, Post-menopausal       Family History   Problem Relation Age of Onset    COPD Mother     COPD Father     Heart attack Sister     Liver cancer Brother     No Known Problems Daughter     No Known Problems Son     No Known Problems Maternal Grandmother     No Known Problems Maternal Grandfather     No Known Problems Paternal Grandmother     No Known Problems Paternal Grandfather     Colon cancer Neg Hx     Liver disease Neg  Hx     Cirrhosis Neg Hx        Current Medications:    Current Facility-Administered Medications:     aluminum-magnesium hydroxide-simethicone (MAALOX MAX) 400-400-40 MG/5ML suspension 30 mL, 30 mL, Oral, Once, Sadie Ley APRN    Enoxaparin Sodium (LOVENOX) syringe 60 mg, 1 mg/kg, Subcutaneous, Q24H, Gustavo Vazquez MD    furosemide (LASIX) injection 20 mg, 20 mg, Intravenous, Once, Gustavo Vazquez MD    nitroglycerin (NITROSTAT) SL tablet 0.4 mg, 0.4 mg, Sublingual, Q5 Min PRN, Shayne Contreras MD, 0.4 mg at 07/05/24 1352    nitroglycerin (TRIDIL) 200 mcg/ml infusion, 10-50 mcg/min, Intravenous, Titrated, Gustavo Vazquez MD, Last Rate: 3 mL/hr at 07/05/24 1613, 10 mcg/min at 07/05/24 1613    sodium chloride 0.9 % flush 10 mL, 10 mL, Intravenous, PRN, Shayne Contreras MD    Current Outpatient Medications:     acetaminophen (TYLENOL) 500 MG tablet, Take 2 tablets by mouth 2 (Two) Times a Day As Needed for Mild Pain., Disp: , Rfl:     aspirin 81 MG EC tablet, Take 1 tablet by mouth Daily., Disp: , Rfl:     cefdinir (OMNICEF) 300 MG capsule, Take 1 capsule by mouth 2 (Two) Times a Day for 7 days., Disp: 14 capsule, Rfl: 0    Continuous Glucose  (FreeStyle Rony 3 Sod) device, , Disp: , Rfl:     Continuous Glucose Sensor (FreeStyle Rony 3 Sensor) misc, , Disp: , Rfl:     Cranberry 400 MG tablet, Take 1 tablet by mouth Every Night., Disp: , Rfl:     dilTIAZem CD (CARDIZEM CD) 240 MG 24 hr capsule, Take 1 capsule by mouth Daily., Disp: , Rfl:     Flaxseed, Linseed, (Flaxseed Oil) 1000 MG capsule, Take 2 capsules by mouth 2 (Two) Times a Day., Disp: , Rfl:     furosemide (LASIX) 40 MG tablet, Take 1 tablet by mouth Daily., Disp: , Rfl:     glipiZIDE (GLUCOTROL) 10 MG tablet, Take 2 tablets by mouth 2 (Two) Times a Day Before Meals., Disp: , Rfl:     hydrALAZINE (APRESOLINE) 50 MG tablet, Take 1 tablet by mouth 2 (Two) Times a Day., Disp: , Rfl:     insulin detemir  (LEVEMIR) 100 UNIT/ML injection, Inject 70 Units under the skin into the appropriate area as directed Daily., Disp: , Rfl:     isosorbide mononitrate (IMDUR) 30 MG 24 hr tablet, Take 1 tablet by mouth Daily., Disp: 30 tablet, Rfl: 11    levothyroxine (SYNTHROID, LEVOTHROID) 88 MCG tablet, Take 1 tablet by mouth Daily., Disp: , Rfl:     Magnesium 500 MG capsule, Take 1 tablet by mouth As Needed., Disp: , Rfl:     metoprolol tartrate (LOPRESSOR) 25 MG tablet, Take 1 tablet by mouth 2 (Two) Times a Day., Disp: , Rfl:     multivitamin with minerals (MULTIVITAMIN ADULTS 50+ PO), Take 1 tablet by mouth Daily., Disp: , Rfl:     nitroglycerin (NITROSTAT) 0.4 MG SL tablet, , Disp: , Rfl:     Omega-3 Fatty Acids (fish oil) 1200 MG capsule capsule, Take 2 capsules by mouth Daily., Disp: , Rfl:     omeprazole (priLOSEC) 20 MG capsule, Take 1 capsule by mouth Daily. Every other day, Disp: , Rfl:     simvastatin (ZOCOR) 10 MG tablet, Take 1 tablet by mouth Every Night., Disp: , Rfl:     triamcinolone (KENALOG) 0.1 % ointment, Apply 1 Application topically to the appropriate area as directed Daily As Needed for Irritation or Rash., Disp: , Rfl:     vitamin B-12 (CYANOCOBALAMIN) 1000 MCG tablet, Take 1 tablet by mouth 4 (Four) Times a Week., Disp: , Rfl:      Review of Systems   Cardiovascular:  Positive for chest pain and dyspnea on exertion.   Respiratory:  Positive for shortness of breath.    Gastrointestinal:  Positive for diarrhea and heartburn.       OBJECTIVE:  Vitals:    07/05/24 1402 07/05/24 1611 07/05/24 1612 07/05/24 1614   BP: 121/100 144/73     BP Location:       Patient Position:       Pulse: 61 65 61 64   Resp:       Temp:       TempSrc:       SpO2:  93% 94% 95%   Weight:       Height:         No intake/output data recorded.  No intake/output data recorded.  Intake & Output (last 3 days)       None               Physical Exam  Constitutional:       Appearance: Normal appearance.   Cardiovascular:      Rate and  Rhythm: Normal rate and regular rhythm.      Heart sounds: Murmur (Soft systolic) heard.   Pulmonary:      Effort: Pulmonary effort is normal.      Breath sounds: Rales (Bibasilar crackles) present.   Musculoskeletal:      Right lower leg: No edema.      Left lower leg: No edema.   Neurological:      General: No focal deficit present.      Mental Status: She is alert.         Diagnostic Data:  Lab Results (last 24 hours)       Procedure Component Value Units Date/Time    High Sensitivity Troponin T 2Hr [[id number]]  (Abnormal) Collected: 07/05/24 1533    Specimen: Blood Updated: 07/05/24 1602     HS Troponin T 63 ng/L      Troponin T Delta -4 ng/L     Narrative:      High Sensitive Troponin T Reference Range:  <14.0 ng/L- Negative Female for AMI  <22.0 ng/L- Negative Male for AMI  >=14 - Abnormal Female indicating possible myocardial injury.  >=22 - Abnormal Male indicating possible myocardial injury.   Clinicians would have to utilize clinical acumen, EKG, Troponin, and serial changes to determine if it is an Acute Myocardial Infarction or myocardial injury due to an underlying chronic condition.         COVID PRE-OP / PRE-PROCEDURE SCREENING ORDER (NO ISOLATION) - Swab, Nasopharynx [[id number]]  (Normal) Collected: 07/05/24 1323    Specimen: Swab from Nasopharynx Updated: 07/05/24 1424    Narrative:      The following orders were created for panel order COVID PRE-OP / PRE-PROCEDURE SCREENING ORDER (NO ISOLATION) - Swab, Nasopharynx.  Procedure                               Abnormality         Status                     ---------                               -----------         ------                     Respiratory Panel PCR w/...[[id number]]  Normal              Final result                 Please view results for these tests on the individual orders.    Respiratory Panel PCR w/COVID-19(SARS-CoV-2) ADAM/NATY/SHASTA/PAD/COR/KARLA In-House, NP Swab in UTM/VTM, 2 HR TAT - Swab, Nasopharynx [[id number]]  (Normal) Collected:  07/05/24 1323    Specimen: Swab from Nasopharynx Updated: 07/05/24 1424     ADENOVIRUS, PCR Not Detected     Coronavirus 229E Not Detected     Coronavirus HKU1 Not Detected     Coronavirus NL63 Not Detected     Coronavirus OC43 Not Detected     COVID19 Not Detected     Human Metapneumovirus Not Detected     Human Rhinovirus/Enterovirus Not Detected     Influenza A PCR Not Detected     Influenza B PCR Not Detected     Parainfluenza Virus 1 Not Detected     Parainfluenza Virus 2 Not Detected     Parainfluenza Virus 3 Not Detected     Parainfluenza Virus 4 Not Detected     RSV, PCR Not Detected     Bordetella pertussis pcr Not Detected     Bordetella parapertussis PCR Not Detected     Chlamydophila pneumoniae PCR Not Detected     Mycoplasma pneumo by PCR Not Detected    Narrative:      In the setting of a positive respiratory panel with a viral infection PLUS a negative procalcitonin without other underlying concern for bacterial infection, consider observing off antibiotics or discontinuation of antibiotics and continue supportive care. If the respiratory panel is positive for atypical bacterial infection (Bordetella pertussis, Chlamydophila pneumoniae, or Mycoplasma pneumoniae), consider antibiotic de-escalation to target atypical bacterial infection.    High Sensitivity Troponin T [[id number]]  (Abnormal) Collected: 07/05/24 1323    Specimen: Blood Updated: 07/05/24 1353     HS Troponin T 67 ng/L     Narrative:      High Sensitive Troponin T Reference Range:  <14.0 ng/L- Negative Female for AMI  <22.0 ng/L- Negative Male for AMI  >=14 - Abnormal Female indicating possible myocardial injury.  >=22 - Abnormal Male indicating possible myocardial injury.   Clinicians would have to utilize clinical acumen, EKG, Troponin, and serial changes to determine if it is an Acute Myocardial Infarction or myocardial injury due to an underlying chronic condition.         Comprehensive Metabolic Panel [[id number]]  (Abnormal)  Collected: 07/05/24 1323    Specimen: Blood Updated: 07/05/24 1352     Glucose 84 mg/dL      BUN 29 mg/dL      Creatinine 1.31 mg/dL      Sodium 142 mmol/L      Potassium 4.5 mmol/L      Chloride 104 mmol/L      CO2 29.0 mmol/L      Calcium 10.1 mg/dL      Total Protein 7.5 g/dL      Albumin 3.8 g/dL      ALT (SGPT) 24 U/L      AST (SGOT) 37 U/L      Alkaline Phosphatase 124 U/L      Total Bilirubin 0.2 mg/dL      Globulin 3.7 gm/dL      Comment: Calculated Result        A/G Ratio 1.0 g/dL      BUN/Creatinine Ratio 22.1     Anion Gap 9.0 mmol/L      eGFR 38.1 mL/min/1.73     Narrative:      GFR Normal >60  Chronic Kidney Disease <60  Kidney Failure <15    The GFR formula is only valid for adults with stable renal function between ages 18 and 70.    Lipase [[id number]]  (Normal) Collected: 07/05/24 1323    Specimen: Blood Updated: 07/05/24 1352     Lipase 36 U/L     BNP [[id number]]  (Abnormal) Collected: 07/05/24 1323    Specimen: Blood Updated: 07/05/24 1352     proBNP 3,885.0 pg/mL     Narrative:      This assay is used as an aid in the diagnosis of individuals suspected of having heart failure. It can be used as an aid in the diagnosis of acute decompensated heart failure (ADHF) in patients presenting with signs and symptoms of ADHF to the emergency department (ED). In addition, NT-proBNP of <300 pg/mL indicates ADHF is not likely.    Age Range Result Interpretation  NT-proBNP Concentration (pg/mL:      <50             Positive            >450                   Gray                 300-450                    Negative             <300    50-75           Positive            >900                  Gray                300-900                  Negative            <300      >75             Positive            >1800                  Gray                300-1800                  Negative            <300    CBC & Differential [[id number]]  (Abnormal) Collected: 07/05/24 1323    Specimen: Blood Updated: 07/05/24 1333     Narrative:      The following orders were created for panel order CBC & Differential.  Procedure                               Abnormality         Status                     ---------                               -----------         ------                     CBC Auto Differential[[id number]]        Abnormal            Final result                 Please view results for these tests on the individual orders.    CBC Auto Differential [[id number]]  (Abnormal) Collected: 07/05/24 1323    Specimen: Blood Updated: 07/05/24 1333     WBC 8.50 10*3/mm3      RBC 4.48 10*6/mm3      Hemoglobin 11.2 g/dL      Hematocrit 37.1 %      MCV 82.8 fL      MCH 25.0 pg      MCHC 30.2 g/dL      RDW 16.0 %      RDW-SD 48.5 fl      MPV 10.1 fL      Platelets 249 10*3/mm3      Neutrophil % 72.3 %      Lymphocyte % 16.4 %      Monocyte % 9.2 %      Eosinophil % 1.5 %      Basophil % 0.2 %      Immature Grans % 0.4 %      Neutrophils, Absolute 6.15 10*3/mm3      Lymphocytes, Absolute 1.39 10*3/mm3      Monocytes, Absolute 0.78 10*3/mm3      Eosinophils, Absolute 0.13 10*3/mm3      Basophils, Absolute 0.02 10*3/mm3      Immature Grans, Absolute 0.03 10*3/mm3      nRBC 0.0 /100 WBC     [place] Draw [[id number]] Collected: 07/05/24 1323    Specimen: Blood Updated: 07/05/24 4200    Narrative:      The following orders were created for panel order [place] Draw.  Procedure                               Abnormality         Status                     ---------                               -----------         ------                     Green Top (Gel)[[id number]]                                  Final result               Lavender Top[[id number]]                                     Final result               Gold Top - SST[[id number]]                                   Final result               Gray Top[[id number]]                                         Final result               Light Blue Top[[id number]]                                   Final result                  Please view results for these tests on the individual orders.    Green Top (Gel) [[id number]] Collected: 07/05/24 1323    Specimen: Blood Updated: 07/05/24 1330     Extra Tube Hold for add-ons.     Comment: Auto resulted.       Lavender Top [[id number]] Collected: 07/05/24 1323    Specimen: Blood Updated: 07/05/24 1330     Extra Tube hold for add-on     Comment: Auto resulted       Gold Top - SST [[id number]] Collected: 07/05/24 1323    Specimen: Blood Updated: 07/05/24 1330     Extra Tube Hold for add-ons.     Comment: Auto resulted.       Gray Top [[id number]] Collected: 07/05/24 1323    Specimen: Blood Updated: 07/05/24 1330     Extra Tube Hold for add-ons.     Comment: Auto resulted.       Light Blue Top [[id number]] Collected: 07/05/24 1323    Specimen: Blood Updated: 07/05/24 1330     Extra Tube Hold for add-ons.     Comment: Auto resulted             ECG/EMG Results (last 24 hours)       Procedure Component Value Units Date/Time    ECG 12 Lead ED Triage Standing Order; Chest Pain [[id number]] Collected: 07/05/24 1256     Updated: 07/05/24 1440     QT Interval 428 ms      QTC Interval 462 ms     Narrative:      Test Reason : ED Triage Standing Order~  Blood Pressure :   */*   mmHG  Vent. Rate :  70 BPM     Atrial Rate :  70 BPM     P-R Int : 152 ms          QRS Dur :  90 ms      QT Int : 428 ms       P-R-T Axes :  35  25  40 degrees     QTc Int : 462 ms    Sinus rhythm with occasional premature ventricular complexes  Left ventricular hypertrophy with repolarization abnormality  Possible Inferior infarct , age undetermined  Abnormal ECG    Confirmed by MADAY GUTIERREZ MD ([id number]) on 7/5/2024 2:40:28 PM    Referred By: EDMD           Confirmed By: MADAY GUTIERREZ MD    ECG 12 Lead ED Triage Standing Order; Chest Pain [[id number]] Collected: 07/05/24 1530     Updated: 07/05/24 1532     QT Interval 440 ms      QTC Interval 461 ms     Narrative:      Test Reason : ED Triage Standing Order~  Blood Pressure :   */*    mmHG  Vent. Rate :  66 BPM     Atrial Rate :  66 BPM     P-R Int : 142 ms          QRS Dur :  90 ms      QT Int : 440 ms       P-R-T Axes :  56  18  62 degrees     QTc Int : 461 ms    Sinus rhythm with frequent premature ventricular complexes  Minimal voltage criteria for LVH, may be normal variant  Inferior infarct (cited on or before 01-JUL-2024)  Abnormal ECG  When compared with ECG of 05-JUL-2024 12:56,  No significant change was found    Referred By: ED MD           Confirmed By:               Chest pain    Coronary artery disease involving native coronary artery of native heart without angina pectoris    Essential hypertension    Dyslipidemia    Type 2 diabetes mellitus without complication, with long-term current use of insulin    Paroxysmal atrial fibrillation    Cirrhosis of liver without ascites    Dementia    Stage 3b chronic kidney disease    Chronic diastolic CHF (congestive heart failure)    GERD without esophagitis      Assessment/Plan:    Chest pain/unstable angina  Troponins are elevated but not more so than recent baseline  EKG with chronic inferior infarct and frequent PVCs  With patient's advanced age we will try to treat medically.  Start nitroglycerin drip now for treatment of chest pain.  Continue aspirin.  Change statin to atorvastatin 40 mg  Will utilize therapeutic Lovenox for anticoagulation, plan for 48 hours.  Resume metoprolol  Obtain echocardiogram to reevaluate LV systolic function and wall motion.  Would defer cardiac catheterization unless patient has continued refractory chest pain.  Hypertension  Starting nitroglycerin drip  Will continue her home metoprolol.  Holding diltiazem and hydralazine at this time  Urinary tract infection  Is supposed to complete a cefdinir course on Tuesday.  CKD  Appears near baseline  Diabetes  Appreciate hospitalist assistance.  She was on the home insulin regimen.    She will be admitted to telemetry.    We will follow.        Gustavo Vazquez MD  FACC        Electronically signed by Gustavo Vazquez MD at 07/05/24 9712

## 2024-07-07 NOTE — PROGRESS NOTES
Subjective:     Encounter Date:07/05/2024      Patient ID: Ninoska Gloria is a 89 y.o. female.    Chief Complaint:  Chest Pain     Update 7/6/24  No chest pain overnight.  No complaints.      Update 7/724  No acute changes    Review of Systems   Cardiovascular:  Positive for chest pain.       Procedures       Objective:     Constitutional:       Appearance: Healthy appearance. Not in distress.   Neck:      Vascular: No JVR. JVD normal.   Pulmonary:      Effort: Pulmonary effort is normal.      Breath sounds: Normal breath sounds. No wheezing. No rhonchi. No rales.   Chest:      Chest wall: Not tender to palpatation.   Cardiovascular:      PMI at left midclavicular line. Normal rate. Regular rhythm. Normal S1. Normal S2.       Murmurs: There is no murmur.      No gallop.  No click. No rub.   Abdominal:      Palpations: Abdomen is soft.      Tenderness: There is no abdominal tenderness.   Musculoskeletal: Normal range of motion.         General: No tenderness. Skin:     General: Skin is warm and dry.   Neurological:      General: No focal deficit present.      Mental Status: Alert and oriented to person, place and time.         Lab Review:       Assessment:       -NSTEMI, hx of MI in 1996 s/p TPA, PCI to RCA and LAD in 1996  -Pulmonary HTN  -Afib/flutter, no long term anticoagulation secondary to fass  -HTN  -HLD  -DM  -CKD  -hypothyroidism  -Hx of retinal detachment  -ACUNA  -echo, preserved LV function, EKG NSR         Plan:       -lovenox 1mg/kg (for 48 hours) daily (kidney function), aspirin, statin. Will DC lovenox today.  Tolerating conservative treatment.    -stop drip and start imdur 60 mg.    -BB  -starting valsartan today  -will see how symptomatic when gets out of bed.  PT ordered.

## 2024-07-08 ENCOUNTER — READMISSION MANAGEMENT (OUTPATIENT)
Dept: CALL CENTER | Facility: HOSPITAL | Age: 89
End: 2024-07-08
Payer: MEDICARE

## 2024-07-08 VITALS
OXYGEN SATURATION: 93 % | TEMPERATURE: 98.3 F | DIASTOLIC BLOOD PRESSURE: 49 MMHG | BODY MASS INDEX: 24.49 KG/M2 | RESPIRATION RATE: 18 BRPM | WEIGHT: 121.47 LBS | HEART RATE: 56 BPM | SYSTOLIC BLOOD PRESSURE: 129 MMHG | HEIGHT: 59 IN

## 2024-07-08 PROBLEM — I5A NONISCHEMIC NONTRAUMATIC MYOCARDIAL INJURY: Status: ACTIVE | Noted: 2024-07-08

## 2024-07-08 PROBLEM — E43 SEVERE MALNUTRITION: Status: ACTIVE | Noted: 2024-07-08

## 2024-07-08 PROBLEM — R07.9 CHEST PAIN: Status: RESOLVED | Noted: 2024-07-05 | Resolved: 2024-07-08

## 2024-07-08 PROBLEM — I50.32 CHRONIC HEART FAILURE WITH PRESERVED EJECTION FRACTION (HFPEF): Status: ACTIVE | Noted: 2024-07-08

## 2024-07-08 LAB
GLUCOSE BLDC GLUCOMTR-MCNC: 125 MG/DL (ref 70–130)
GLUCOSE BLDC GLUCOMTR-MCNC: 191 MG/DL (ref 70–130)
QT INTERVAL: 440 MS
QTC INTERVAL: 461 MS

## 2024-07-08 PROCEDURE — 63710000001 INSULIN GLARGINE PER 5 UNITS: Performed by: INTERNAL MEDICINE

## 2024-07-08 PROCEDURE — 99232 SBSQ HOSP IP/OBS MODERATE 35: CPT | Performed by: INTERNAL MEDICINE

## 2024-07-08 PROCEDURE — 63710000001 INSULIN LISPRO (HUMAN) PER 5 UNITS: Performed by: NURSE PRACTITIONER

## 2024-07-08 PROCEDURE — 99239 HOSP IP/OBS DSCHRG MGMT >30: CPT | Performed by: NURSE PRACTITIONER

## 2024-07-08 PROCEDURE — 82948 REAGENT STRIP/BLOOD GLUCOSE: CPT

## 2024-07-08 RX ORDER — NITROGLYCERIN 400 UG/1
1 SPRAY ORAL
Qty: 12 G | Refills: 0 | Status: SHIPPED | OUTPATIENT
Start: 2024-07-08

## 2024-07-08 RX ORDER — ATORVASTATIN CALCIUM 40 MG/1
40 TABLET, FILM COATED ORAL NIGHTLY
Qty: 90 TABLET | Refills: 0 | Status: SHIPPED | OUTPATIENT
Start: 2024-07-08

## 2024-07-08 RX ORDER — CEFDINIR 300 MG/1
300 CAPSULE ORAL
Qty: 1 CAPSULE | Refills: 0 | Status: SHIPPED | OUTPATIENT
Start: 2024-07-08 | End: 2024-07-09

## 2024-07-08 RX ORDER — AMLODIPINE BESYLATE 5 MG/1
5 TABLET ORAL
Status: DISCONTINUED | OUTPATIENT
Start: 2024-07-08 | End: 2024-07-08 | Stop reason: HOSPADM

## 2024-07-08 RX ORDER — AMLODIPINE BESYLATE 5 MG/1
5 TABLET ORAL
Qty: 30 TABLET | Refills: 0 | Status: SHIPPED | OUTPATIENT
Start: 2024-07-08

## 2024-07-08 RX ORDER — ISOSORBIDE MONONITRATE 60 MG/1
120 TABLET, EXTENDED RELEASE ORAL
Qty: 30 TABLET | Refills: 0 | Status: SHIPPED | OUTPATIENT
Start: 2024-07-08

## 2024-07-08 RX ORDER — VALSARTAN 40 MG/1
40 TABLET ORAL
Qty: 30 TABLET | Refills: 0 | Status: SHIPPED | OUTPATIENT
Start: 2024-07-08

## 2024-07-08 RX ADMIN — VALSARTAN 40 MG: 80 TABLET, FILM COATED ORAL at 08:42

## 2024-07-08 RX ADMIN — ISOSORBIDE MONONITRATE 60 MG: 60 TABLET, EXTENDED RELEASE ORAL at 08:42

## 2024-07-08 RX ADMIN — Medication 1 TABLET: at 08:42

## 2024-07-08 RX ADMIN — INSULIN GLARGINE 20 UNITS: 100 INJECTION, SOLUTION SUBCUTANEOUS at 08:42

## 2024-07-08 RX ADMIN — CEFDINIR 300 MG: 300 CAPSULE ORAL at 08:49

## 2024-07-08 RX ADMIN — LEVOTHYROXINE SODIUM 88 MCG: 88 TABLET ORAL at 05:09

## 2024-07-08 RX ADMIN — INSULIN LISPRO 2 UNITS: 100 INJECTION, SOLUTION INTRAVENOUS; SUBCUTANEOUS at 12:26

## 2024-07-08 RX ADMIN — HYDRALAZINE HYDROCHLORIDE 50 MG: 50 TABLET ORAL at 08:41

## 2024-07-08 RX ADMIN — ASPIRIN 81 MG: 81 TABLET, COATED ORAL at 08:42

## 2024-07-08 RX ADMIN — Medication 10 ML: at 08:42

## 2024-07-08 RX ADMIN — AMLODIPINE BESYLATE 5 MG: 5 TABLET ORAL at 08:48

## 2024-07-08 RX ADMIN — PANTOPRAZOLE SODIUM 40 MG: 40 TABLET, DELAYED RELEASE ORAL at 05:09

## 2024-07-08 RX ADMIN — METOPROLOL TARTRATE 25 MG: 25 TABLET, FILM COATED ORAL at 08:42

## 2024-07-08 NOTE — DISCHARGE INSTR - ACTIVITY
Activity as tolerated, up with assistance   Continued Stay Note  WILMA Adams     Patient Name: Clint Cee  MRN: [id number]  Today's Date: 9/18/2023    Admit Date: 9/8/2023    Plan: From [facility] and they are following for return. From home with spouse. Pasrr approved if needed   Discharge Plan       Row Name 09/18/23 1415       Plan    Plan From [facility] and they are following for return. From home with spouse. Pasrr approved if needed    Plan Comments Barrier to dc: bowel movement.                             Expected Discharge Date and Time       Expected Discharge Date Expected Discharge Time    Sep 19, 2023               Leatha Garcia RN

## 2024-07-08 NOTE — PAYOR COMM NOTE
"Ninoska Gloria (89 y.o. Female)     [id number]           Date of Birth   1931    Social Security Number       Address   [address] [id number]    Home Phone   [phone]    MRN   [id number]       Buddhism   [facility]    Marital Status                               Admission Date   24    Admission Type   Emergency    Admitting Provider   Jose Robertson DO    Attending Provider   Jose Robertson DO    Department, Room/Bed   [facility] 6A, N607/1       Discharge Date       Discharge Disposition   Home-Health Care [facility]    Discharge Destination                                 Attending Provider: Jose Robertson DO    Allergies: No Known Allergies    Isolation: None   Infection: None   Code Status: CPR    Ht: 149.9 cm (59\")   Wt: 55.1 kg (121 lb 7.6 oz)    Admission Cmt: None   Principal Problem: Chest pain [R07.9]                   Active Insurance as of 2024       Primary Coverage       Payor Plan Insurance Group Employer/Plan Group    ANTH MEDICARE REPLACEMENT ANTH MEDICARE ADVANTAGE [id number]       Payor Plan Address Payor Plan Phone Number Payor Plan Fax Number Effective Dates    [address] [phone]  2019 - None Entered    [facility] [address]         Subscriber Name Subscriber Birth Date Member ID       NINOSKA GLORIA 1931 [id number]                     Emergency Contacts        (Rel.) Home Phone Work Phone Mobile Phone    Ira Morales (Daughter) [phone] -- [phone]    massimo valero (Daughter) [phone] -- --                 Discharge Summary        Cherie Geronimo APRN at 24 0834       Attestation signed by Jose Robertson DO at 24 1331    I have reviewed this documentation and agree.                      [facility] Medicine Services  DISCHARGE SUMMARY    Patient Name: Ninoska Gloria  : 1931  MRN: [id number]    Date of Admission: " 7/5/2024  Date of Discharge:  07/08/2024  Primary Care Physician: Lizette Fitzpatrick PA    Consults       Date and Time Order Name Status Description    7/5/2024  5:08 PM Inpatient Cardiology Consult Completed             Hospital Course     Presenting Problem:   Chest pain [R07.9]  NSTEMI (non-ST elevated myocardial infarction) [I21.4]  Chronic heart failure with preserved ejection fraction (HFpEF) [I50.32]    Active Hospital Problems    Diagnosis  POA    Nonischemic nontraumatic myocardial injury [I5A]  Yes    Chronic heart failure with preserved ejection fraction (HFpEF) [I50.32]  Yes    NSTEMI (non-ST elevated myocardial infarction) [I21.4]  Yes    Stage 3b chronic kidney disease [N18.32]  Yes    Chronic diastolic CHF (congestive heart failure) [I50.32]  Yes    GERD without esophagitis [K21.9]  Yes    Cirrhosis of liver without ascites [K74.60]  Yes    Coronary artery disease involving native coronary artery of native heart without angina pectoris [I25.10]  Yes    Essential hypertension [I10]  Yes    Dyslipidemia [E78.5]  Yes    Type 2 diabetes mellitus without complication, with long-term current use of insulin [E11.9, Z79.4]  Not Applicable    Paroxysmal atrial fibrillation [I48.0]  Yes      Resolved Hospital Problems    Diagnosis Date Resolved POA    **Chest pain [R07.9] 07/08/2024 Yes      Hospital Course:  Ninoska Gloria is a 89 y.o. female PMH HTN, HLD, CAD, A-fib not on anticoagulation, HFpEF, DM2, CKD 3, ACUNA cirrhosis who presented with acute onset of chest/epigastric pain that improved with nitro but without resolution of the chest pain and shortness of breath. On 07/01 Troponin 73, 82 and on 7/6 67, 63. EKG showed chronic inferior infarct but no new ST changes. BNP elevated at 3885 but CXR was improved compared to 07/01. Cardiology consulted recommending nitro gtt and therapeutic lovenox for 48 hours. Cardiology changed statin to atorvastatin, increased imdur to 120 mg daily, increased metoprolol to 50  mg BID, added norvasc 5 mg daily and stopped diltiazem CD. Pt also noted with UTI and will be completing cefdinir.     Assessment/Plan  Acute chest/epigastric pain  CAD/HTN/HLD  Afib not on anticoagulation   Chronic HFpEF, compensated  Chronic O2 use  - cardiology consulted recommending medical management with nitro gtt and therapeutic lovenox.   - ASA, atorvastatin, increased imdur to 120 mg daily, increased metoprolol to 50 mg BID, added norvasc 5 mg daily and stopped diltiazem CD.  - follow up with Dr Peoples in 4 weeks    Abnormal UA  - seen in the ED 7/1 generalized weakness, Dx'ed w/ UTI and Rx'es oral Abx; Cx is 50K CFU E Coli    DM type 2 A1C 5.9% with insulin use  - BG borderline, decreased lantus from 25 u to 20 u    Cirrhosis w/o known ascites  CKD III baseline 1.1-1.6 eGFR 30's  GERD-PPI  Hypothyroidism- levothyroxine  Dementia  Severe malnutrition  - nutrition consult        Discharge Follow Up Recommendations for labs/diagnostic  Follow up with PCP in 1 week  Follow up with Dr Peoples in 4 weeks      Day of Discharge     HPI:   Pt sitting up in bed eating breakfast. She denies chest pain and shortness of breath. She states she wants to go home and check on her garden and flowers.         Vital Signs:   Temp:  [97.7 °F (36.5 °C)-98.3 °F (36.8 °C)] 98.3 °F (36.8 °C)  Heart Rate:  [51-60] 56  Resp:  [14-18] 18  BP: (118-139)/(47-66) 129/49     Physical Exam:  Constitutional: Awake, alert, NAD  HENT: NCAT, mucous membranes moist  Respiratory: Fin crackles in lower lobes, nonlabored   Cardiovascular: RRR, no murmurs, rubs, or gallops  Gastrointestinal: Positive bowel sounds, soft, nontender, nondistended  Musculoskeletal: No bilateral ankle edema  Psychiatric: Appropriate affect, cooperative  Neurologic: Oriented x 3, JC, speech clear  Skin: No rashes      Pertinent  and/or Most Recent Results     Results from last 7 days   Lab Units 07/07/24  0408 07/06/24  0423 07/05/24  1323 07/01/24  1517   WBC 10*3/mm3   --  7.03 8.50 8.15   HEMOGLOBIN g/dL  --  10.1* 11.2* 10.3*   HEMATOCRIT %  --  33.7* 37.1 35.4   PLATELETS 10*3/mm3  --  209 249 235   SODIUM mmol/L 138 135* 142 142   POTASSIUM mmol/L 4.3 4.3 4.5 4.3   CHLORIDE mmol/L 103 99 104 106   CO2 mmol/L 26.0 26.0 29.0 25.0   BUN mg/dL 29* 31* 29* 38*   CREATININE mg/dL 1.30* 1.33* 1.31* 1.55*   GLUCOSE mg/dL 76 99 84 152*   CALCIUM mg/dL 9.2 9.3 10.1* 9.3     Results from last 7 days   Lab Units 07/05/24  1323 07/01/24  1517   BILIRUBIN mg/dL 0.2 <0.2   ALK PHOS U/L 124* 129*   ALT (SGPT) U/L 24 29   AST (SGOT) U/L 37* 49*     Results from last 7 days   Lab Units 07/06/24  0423   CHOLESTEROL mg/dL 180   TRIGLYCERIDES mg/dL 251*   HDL CHOL mg/dL 37*     Results from last 7 days   Lab Units 07/06/24  0423 07/05/24  1533 07/05/24  1323 07/01/24  1954 07/01/24  1751 07/01/24  1517   TSH uIU/mL 1.800  --   --   --   --  0.990   HEMOGLOBIN A1C % 5.90*  --   --   --   --   --    PROBNP pg/mL  --   --  3,885.0*  --   --  1,648.0   HSTROP T ng/L  --  63* 67*  --  82* 73*   PROCALCITONIN ng/mL  --   --   --   --   --  0.09   LACTATE mmol/L  --   --   --  1.3  --   --        Brief Urine Lab Results  (Last result in the past 365 days)        Color   Clarity   Blood   Leuk Est   Nitrite   Protein   CREAT   Urine HCG        07/01/24 1629 Yellow   Clear   Negative   Small (1+)   Positive   100 mg/dL (2+)                   Microbiology Results Abnormal       Procedure Component Value - Date/Time    COVID PRE-OP / PRE-PROCEDURE SCREENING ORDER (NO ISOLATION) - Swab, Nasopharynx [[id number]]  (Normal) Collected: 07/05/24 1323    Lab Status: Final result Specimen: Swab from Nasopharynx Updated: 07/05/24 4951    Narrative:      The following orders were created for panel order COVID PRE-OP / PRE-PROCEDURE SCREENING ORDER (NO ISOLATION) - Swab, Nasopharynx.  Procedure                               Abnormality         Status                     ---------                                -----------         ------                     Respiratory Panel PCR w/...[[id number]]  Normal              Final result                 Please view results for these tests on the individual orders.    Respiratory Panel PCR w/COVID-19(SARS-CoV-2) ADAM/NATY/SHASTA/PAD/COR/[facility] In-House, NP Swab in UTM/VTM, 2 HR TAT - Swab, Nasopharynx [[id number]]  (Normal) Collected: 07/05/24 1323    Lab Status: Final result Specimen: Swab from Nasopharynx Updated: 07/05/24 1424     ADENOVIRUS, PCR Not Detected     Coronavirus 229E Not Detected     Coronavirus HKU1 Not Detected     Coronavirus NL63 Not Detected     Coronavirus OC43 Not Detected     COVID19 Not Detected     Human Metapneumovirus Not Detected     Human Rhinovirus/Enterovirus Not Detected     Influenza A PCR Not Detected     Influenza B PCR Not Detected     Parainfluenza Virus 1 Not Detected     Parainfluenza Virus 2 Not Detected     Parainfluenza Virus 3 Not Detected     Parainfluenza Virus 4 Not Detected     RSV, PCR Not Detected     Bordetella pertussis pcr Not Detected     Bordetella parapertussis PCR Not Detected     Chlamydophila pneumoniae PCR Not Detected     Mycoplasma pneumo by PCR Not Detected    Narrative:      In the setting of a positive respiratory panel with a viral infection PLUS a negative procalcitonin without other underlying concern for bacterial infection, consider observing off antibiotics or discontinuation of antibiotics and continue supportive care. If the respiratory panel is positive for atypical bacterial infection (Bordetella pertussis, Chlamydophila pneumoniae, or Mycoplasma pneumoniae), consider antibiotic de-escalation to target atypical bacterial infection.            Imaging Results (All)       Procedure Component Value Units Date/Time    XR Chest 1 View [[id number]] Collected: 07/05/24 1328     Updated: 07/05/24 1332    Narrative:      XR CHEST 1 VW    Date of Exam: 7/5/2024 1:16 PM EDT    Indication: Chest Pain Triage Protocol    Comparison:  7/1/2024    Findings:  Heart and pulmonary vessels appear within normal limits. Previously seen mild pulmonary edema has resolved. No acute infiltrates or effusions are identified.      Impression:      Impression:  No acute process.      Electronically Signed: Elena Albrecht MD    7/5/2024 1:29 PM EDT    Workstation ID: [id number]                    Results for orders placed during the hospital encounter of 07/05/24    Adult Transthoracic Echo Complete W/ Cont if Necessary Per Protocol    Interpretation Summary    Left ventricular systolic function is normal. Calculated left ventricular EF = 57.7%    Left ventricular wall thickness is consistent with hypertrophy.    Left atrial volume is moderately increased.    There is calcification of the aortic valve.    Moderate mitral valve regurgitation is present.    Moderate tricuspid valve regurgitation is present.    Estimated right ventricular systolic pressure from tricuspid regurgitation is mildly elevated (35-45 mmHg).        Discharge Details        Discharge Medications        New Medications        Instructions Start Date   amLODIPine 5 MG tablet  Commonly known as: NORVASC   5 mg, Oral, Every 24 Hours Scheduled      atorvastatin 40 MG tablet  Commonly known as: LIPITOR   40 mg, Oral, Nightly      insulin glargine 100 UNIT/ML injection  Commonly known as: LANTUS, SEMGLEE   20 Units, Subcutaneous, Daily   Start Date: July 9, 2024     nitroglycerin 0.4 MG/SPRAY spray  Commonly known as: Nitrolingual  Replaces: nitroglycerin 0.4 MG SL tablet   1 spray, Sublingual, Every 5 Minutes PRN      valsartan 40 MG tablet  Commonly known as: DIOVAN   40 mg, Oral, Every 24 Hours Scheduled             Changes to Medications        Instructions Start Date   cefdinir 300 MG capsule  Commonly known as: OMNICEF  What changed: when to take this   300 mg, Oral, Every 24 Hours Scheduled      isosorbide mononitrate 60 MG 24 hr tablet  Commonly known as: IMDUR  What changed:   medication  strength  how much to take  when to take this   120 mg, Oral, Every 24 Hours Scheduled      metoprolol tartrate 25 MG tablet  Commonly known as: LOPRESSOR  What changed: how much to take   50 mg, Oral, 2 Times Daily             Continue These Medications        Instructions Start Date   acetaminophen 500 MG tablet  Commonly known as: TYLENOL   1,000 mg, Oral, 2 Times Daily PRN      aspirin 81 MG EC tablet   81 mg, Oral, Daily      Cranberry 400 MG tablet   1 tablet, Oral, Nightly      fish oil 1200 MG capsule capsule   2,400 mg, Oral, Daily      Flaxseed Oil 1000 MG capsule   2 capsules, Oral, 2 Times Daily      FreeStyle Rony 3 Brunswick device       FreeStyle Rony 3 Sensor misc       hydrALAZINE 50 MG tablet  Commonly known as: APRESOLINE   50 mg, Oral, 2 Times Daily      levothyroxine 88 MCG tablet  Commonly known as: SYNTHROID, LEVOTHROID   88 mcg, Oral, Daily      Magnesium 500 MG capsule   1 tablet, Oral, As Needed      multivitamin with minerals tablet tablet   1 tablet, Oral, Daily      omeprazole 20 MG capsule  Commonly known as: priLOSEC   20 mg, Oral, Daily, Every other day      triamcinolone 0.1 % ointment  Commonly known as: KENALOG   1 application , Topical, Daily PRN      vitamin B-12 1000 MCG tablet  Commonly known as: CYANOCOBALAMIN   1,000 mcg, Oral, 4 Times Weekly             Stop These Medications      dilTIAZem  MG 24 hr capsule  Commonly known as: CARDIZEM CD     furosemide 40 MG tablet  Commonly known as: LASIX     glipizide 10 MG tablet  Commonly known as: GLUCOTROL     insulin detemir 100 UNIT/ML injection  Commonly known as: LEVEMIR     nitroglycerin 0.4 MG SL tablet  Commonly known as: NITROSTAT  Replaced by: nitroglycerin 0.4 MG/SPRAY spray     simvastatin 10 MG tablet  Commonly known as: ZOCOR              No Known Allergies      Discharge Disposition:  Home-Health Care Sv    Discharge Diet:  Diet Order   Procedures    Diet: Cardiac; Healthy Heart (2-3 Na+); Fluid Consistency: Thin  (IDDSI 0)         Discharge Activity:   Activity Instructions       Activity as Tolerated      Up WIth Assist     Additional Activity Instructions:    Activity as tolerated, up with assistance             CODE STATUS:    Code Status and Medical Interventions:   Ordered at: 07/05/24 1625     Level Of Support Discussed With:    Patient    Next of Kin (If No Surrogate)     Code Status (Patient has no pulse and is not breathing):    CPR (Attempt to Resuscitate)     Medical Interventions (Patient has pulse or is breathing):    Full Support         Future Appointments   Date Time Provider Department Center   7/12/2024  1:15 PM Yael Ricardo APRN [facility]   8/12/2024  1:00 PM Teo Peoples MD [facility]   6/16/2025  3:30 PM Teo Peoples MD [facility]       Additional Instructions for the Follow-ups that You Need to Schedule       Call MD With Problems / Concerns   As directed      Instructions: Call provider for chest pain, heart palpitations, shortness of breath    Order Comments: Instructions: Call provider for chest pain, heart palpitations, shortness of breath         Discharge Follow-up with PCP   As directed       Currently Documented PCP:    Lizette Fitzpatrick PA    PCP Phone Number:    [phone]     Follow Up Details: Follow up with PCP in 1 week        Discharge Follow-up with Specified Provider: Dr Peoples; 1 Month   As directed      To: Dr Peoples   Follow Up: 1 Month                Time Spent on Discharge:  40 minutes    Electronically signed by AKBAR Reyes, 07/08/24, 8:34 AM EDT.       Electronically signed by Jose Robertson DO at 07/08/24 1548

## 2024-07-08 NOTE — PROGRESS NOTES
"Ninoska Gloria  [id number]  2/8/1931   LOS: 1 day   Patient Care Team:  Lizette Fitzpatrick PA as PCP - General (Family Medicine)    Chief Complaint:  HFpEF / IHD / T2 DM / CKD / HTN / DEBILITY / PNA / PAF / ELEVATED TROPONINS & CP - POSSIBLE NSTEMI    Subjective     Up on side of bed on 2 L/min nasal cannula.  She states \"I am feeling a lot better.\"  She denies anginal type chest discomfort, increased tachypnea/dyspnea, nausea, headache, or focal motor-sensory changes.  She states every time she eats she has \"stomach pains \"  and this has been the case for several years and she has undergone previous outpatient evaluation and her daughter in room states she has lost approximately 10 to 15 pounds over the past year.  No orthopnea, PND, or peripheral edema.    Objective     Vital Sign Min/Max for last 24 hours  Temp  Min: 97.7 °F (36.5 °C)  Max: 98.7 °F (37.1 °C)   BP  Min: 118/47  Max: 196/82   Pulse  Min: 51  Max: 64   Resp  Min: 14  Max: 16   SpO2  Min: 93 %  Max: 99 %      Weight  Min: 55.1 kg (121 lb 7.6 oz)  Max: 55.1 kg (121 lb 7.6 oz)         07/07/24  0500 07/08/24  0510   Weight: 55.1 kg (121 lb 7.6 oz) 55.1 kg (121 lb 7.6 oz)         Intake/Output Summary (Last 24 hours) at 7/8/2024 0745  Last data filed at 7/7/2024 0800  Gross per 24 hour   Intake 130 ml   Output --   Net 130 ml       Physical Exam:     General Appearance:    Alert, cooperative, in no acute distress   Lungs:     Clear to auscultation,respirations regular, even and                unlabored    Heart:    Regular and normal rate, normal S1 and S2, grade 1/6 systolic murmur, no gallop, no rub, no click   Abdomen:  Extremities:   Soft, nontender, bowel sounds audible x4    No edema, normal range of motion   Pulses:   Pulses palpable and equal bilaterally      Results Review:   Results from last 7 days   Lab Units 07/07/24  0408 07/06/24  0423 07/05/24  1323   SODIUM mmol/L 138 135* 142   POTASSIUM mmol/L 4.3 4.3 4.5   CHLORIDE mmol/L 103 99 104 "   CO2 mmol/L 26.0 26.0 29.0   BUN mg/dL 29* 31* 29*   CREATININE mg/dL 1.30* 1.33* 1.31*   GLUCOSE mg/dL 76 99 84   CALCIUM mg/dL 9.2 9.3 10.1*     Results from last 7 days   Lab Units 07/06/24  0423 07/05/24  1323 07/01/24  1517   WBC 10*3/mm3 7.03 8.50 8.15   HEMOGLOBIN g/dL 10.1* 11.2* 10.3*   HEMATOCRIT % 33.7* 37.1 35.4   PLATELETS 10*3/mm3 209 249 235     Results from last 7 days   Lab Units 07/06/24  0423   CHOLESTEROL mg/dL 180   TRIGLYCERIDES mg/dL 251*   HDL CHOL mg/dL 37*   LDL CHOL mg/dL 100     Results from last 7 days   Lab Units 07/06/24  0423   HEMOGLOBIN A1C % 5.90*     Results from last 7 days   Lab Units 07/05/24  1533 07/05/24  1323 07/01/24  1751   HSTROP T ng/L 63* 67* 82*     NO NEW CXR / EKG / LAB RESULTS.    ACCU-CHEKS: -941-159-125 mg/DL.    Medication Review: REVIEWED; NO DRIPS.    Assessment & Plan     No reassessment of GFR/magnesium value to review this morning.  Given advanced age and stable clinical course and lack of significant elevation of serial troponins would pursue conservative course and suspect type II non-STEMI.  Recent transthoracic echocardiogram does not suggest segmental regional wall motion abnormality.  Would defer MPS/LHC at this time.  I am concerned about the concomitant use of beta-blocker as well as diltiazem CD at this time and recommend using amlodipine to substitute for the latter drug.  Hopefully home soon on the following discharge cardiac medications and follow-up:    ASA 81 mg daily  Atorvastatin 40 mg daily  Amlodipine 5 mg daily  Hydralazine 50 mg BID  Imdur 120 mg daily  Metoprolol tartrate 50 mg BID  NTG spray as needed  [facility] follow-up Dr. Peoples in 4-6 weeks    Discussed with patient and 2 family members in room.      Chest pain    Coronary artery disease involving native coronary artery of native heart without angina pectoris    Essential hypertension    Dyslipidemia    Type 2 diabetes mellitus without complication, with long-term  current use of insulin    Paroxysmal atrial fibrillation    Cirrhosis of liver without ascites    Stage 3b chronic kidney disease    Chronic diastolic CHF (congestive heart failure)    GERD without esophagitis    NSTEMI (non-ST elevated myocardial infarction)    07/08/24  07:45 EDT

## 2024-07-08 NOTE — DISCHARGE SUMMARY
[facility]  DISCHARGE SUMMARY    Patient Name: Ninoska Gloria  : 1931  MRN: [id number]    Date of Admission: 2024  Date of Discharge:  2024  Primary Care Physician: Lizette Fitzpatrick PA    Consults       Date and Time Order Name Status Description    2024  5:08 PM Inpatient Cardiology Consult Completed             Hospital Course     Presenting Problem:   Chest pain [R07.9]  NSTEMI (non-ST elevated myocardial infarction) [I21.4]  Chronic heart failure with preserved ejection fraction (HFpEF) [I50.32]    Active Hospital Problems    Diagnosis  POA    Nonischemic nontraumatic myocardial injury [I5A]  Yes    Chronic heart failure with preserved ejection fraction (HFpEF) [I50.32]  Yes    NSTEMI (non-ST elevated myocardial infarction) [I21.4]  Yes    Stage 3b chronic kidney disease [N18.32]  Yes    Chronic diastolic CHF (congestive heart failure) [I50.32]  Yes    GERD without esophagitis [K21.9]  Yes    Cirrhosis of liver without ascites [K74.60]  Yes    Coronary artery disease involving native coronary artery of native heart without angina pectoris [I25.10]  Yes    Essential hypertension [I10]  Yes    Dyslipidemia [E78.5]  Yes    Type 2 diabetes mellitus without complication, with long-term current use of insulin [E11.9, Z79.4]  Not Applicable    Paroxysmal atrial fibrillation [I48.0]  Yes      Resolved Hospital Problems    Diagnosis Date Resolved POA    **Chest pain [R07.9] 2024 Yes      Hospital Course:  Ninoska Gloria is a 89 y.o. female PMH HTN, HLD, CAD, A-fib not on anticoagulation, HFpEF, DM2, CKD 3, ACUNA cirrhosis who presented with acute onset of chest/epigastric pain that improved with nitro but without resolution of the chest pain and shortness of breath. On  Troponin 73, 82 and on  67, 63. EKG showed chronic inferior infarct but no new ST changes. BNP elevated at 3885 but CXR was improved compared to . Cardiology consulted  recommending nitro gtt and therapeutic lovenox for 48 hours. Cardiology changed statin to atorvastatin, increased imdur to 120 mg daily, increased metoprolol to 50 mg BID, added norvasc 5 mg daily and stopped diltiazem CD. Pt also noted with UTI and will be completing cefdinir.     Assessment/Plan  Acute chest/epigastric pain  CAD/HTN/HLD  Afib not on anticoagulation   Chronic HFpEF, compensated  Chronic O2 use  - cardiology consulted recommending medical management with nitro gtt and therapeutic lovenox.   - ASA, atorvastatin, increased imdur to 120 mg daily, increased metoprolol to 50 mg BID, added norvasc 5 mg daily and stopped diltiazem CD.  - follow up with Dr Peoples in 4 weeks    Abnormal UA  - seen in the ED 7/1 generalized weakness, Dx'ed w/ UTI and Rx'es oral Abx; Cx is 50K CFU E Coli    DM type 2 A1C 5.9% with insulin use  - BG borderline, decreased lantus from 25 u to 20 u    Cirrhosis w/o known ascites  CKD III baseline 1.1-1.6 eGFR 30's  GERD-PPI  Hypothyroidism- levothyroxine  Dementia  Severe malnutrition  - nutrition consult        Discharge Follow Up Recommendations for labs/diagnostic  Follow up with PCP in 1 week  Follow up with Dr Peoples in 4 weeks      Day of Discharge     HPI:   Pt sitting up in bed eating breakfast. She denies chest pain and shortness of breath. She states she wants to go home and check on her garden and flowers.         Vital Signs:   Temp:  [97.7 °F (36.5 °C)-98.3 °F (36.8 °C)] 98.3 °F (36.8 °C)  Heart Rate:  [51-60] 56  Resp:  [14-18] 18  BP: (118-139)/(47-66) 129/49     Physical Exam:  Constitutional: Awake, alert, NAD  HENT: NCAT, mucous membranes moist  Respiratory: Fin crackles in lower lobes, nonlabored   Cardiovascular: RRR, no murmurs, rubs, or gallops  Gastrointestinal: Positive bowel sounds, soft, nontender, nondistended  Musculoskeletal: No bilateral ankle edema  Psychiatric: Appropriate affect, cooperative  Neurologic: Oriented x 3, JC, speech clear  Skin: No  valente      Pertinent  and/or Most Recent Results     Results from last 7 days   Lab Units 07/07/24  0408 07/06/24  0423 07/05/24  1323 07/01/24  1517   WBC 10*3/mm3  --  7.03 8.50 8.15   HEMOGLOBIN g/dL  --  10.1* 11.2* 10.3*   HEMATOCRIT %  --  33.7* 37.1 35.4   PLATELETS 10*3/mm3  --  209 249 235   SODIUM mmol/L 138 135* 142 142   POTASSIUM mmol/L 4.3 4.3 4.5 4.3   CHLORIDE mmol/L 103 99 104 106   CO2 mmol/L 26.0 26.0 29.0 25.0   BUN mg/dL 29* 31* 29* 38*   CREATININE mg/dL 1.30* 1.33* 1.31* 1.55*   GLUCOSE mg/dL 76 99 84 152*   CALCIUM mg/dL 9.2 9.3 10.1* 9.3     Results from last 7 days   Lab Units 07/05/24  1323 07/01/24  1517   BILIRUBIN mg/dL 0.2 <0.2   ALK PHOS U/L 124* 129*   ALT (SGPT) U/L 24 29   AST (SGOT) U/L 37* 49*     Results from last 7 days   Lab Units 07/06/24  0423   CHOLESTEROL mg/dL 180   TRIGLYCERIDES mg/dL 251*   HDL CHOL mg/dL 37*     Results from last 7 days   Lab Units 07/06/24  0423 07/05/24  1533 07/05/24  1323 07/01/24  1954 07/01/24  1751 07/01/24  1517   TSH uIU/mL 1.800  --   --   --   --  0.990   HEMOGLOBIN A1C % 5.90*  --   --   --   --   --    PROBNP pg/mL  --   --  3,885.0*  --   --  1,648.0   HSTROP T ng/L  --  63* 67*  --  82* 73*   PROCALCITONIN ng/mL  --   --   --   --   --  0.09   LACTATE mmol/L  --   --   --  1.3  --   --        Brief Urine Lab Results  (Last result in the past 365 days)        Color   Clarity   Blood   Leuk Est   Nitrite   Protein   CREAT   Urine HCG        07/01/24 1629 Yellow   Clear   Negative   Small (1+)   Positive   100 mg/dL (2+)                   Microbiology Results Abnormal       Procedure Component Value - Date/Time    COVID PRE-OP / PRE-PROCEDURE SCREENING ORDER (NO ISOLATION) - Swab, Nasopharynx [[id number]]  (Normal) Collected: 07/05/24 1323    Lab Status: Final result Specimen: Swab from Nasopharynx Updated: 07/05/24 1424    Narrative:      The following orders were created for panel order COVID PRE-OP / PRE-PROCEDURE SCREENING ORDER (NO  ISOLATION) - Swab, Nasopharynx.  Procedure                               Abnormality         Status                     ---------                               -----------         ------                     Respiratory Panel PCR w/...[[id number]]  Normal              Final result                 Please view results for these tests on the individual orders.    Respiratory Panel PCR w/COVID-19(SARS-CoV-2) ADAM/NAYT/SHASTA/PAD/COR/KARLA In-House, NP Swab in UTM/VTM, 2 HR TAT - Swab, Nasopharynx [[id number]]  (Normal) Collected: 07/05/24 1323    Lab Status: Final result Specimen: Swab from Nasopharynx Updated: 07/05/24 1424     ADENOVIRUS, PCR Not Detected     Coronavirus 229E Not Detected     Coronavirus HKU1 Not Detected     Coronavirus NL63 Not Detected     Coronavirus OC43 Not Detected     COVID19 Not Detected     Human Metapneumovirus Not Detected     Human Rhinovirus/Enterovirus Not Detected     Influenza A PCR Not Detected     Influenza B PCR Not Detected     Parainfluenza Virus 1 Not Detected     Parainfluenza Virus 2 Not Detected     Parainfluenza Virus 3 Not Detected     Parainfluenza Virus 4 Not Detected     RSV, PCR Not Detected     Bordetella pertussis pcr Not Detected     Bordetella parapertussis PCR Not Detected     Chlamydophila pneumoniae PCR Not Detected     Mycoplasma pneumo by PCR Not Detected    Narrative:      In the setting of a positive respiratory panel with a viral infection PLUS a negative procalcitonin without other underlying concern for bacterial infection, consider observing off antibiotics or discontinuation of antibiotics and continue supportive care. If the respiratory panel is positive for atypical bacterial infection (Bordetella pertussis, Chlamydophila pneumoniae, or Mycoplasma pneumoniae), consider antibiotic de-escalation to target atypical bacterial infection.            Imaging Results (All)       Procedure Component Value Units Date/Time    XR Chest 1 View [[id number]] Collected: 07/05/24  1328     Updated: 07/05/24 1332    Narrative:      XR CHEST 1 VW    Date of Exam: 7/5/2024 1:16 PM EDT    Indication: Chest Pain Triage Protocol    Comparison: 7/1/2024    Findings:  Heart and pulmonary vessels appear within normal limits. Previously seen mild pulmonary edema has resolved. No acute infiltrates or effusions are identified.      Impression:      Impression:  No acute process.      Electronically Signed: Elena Albrecht MD    7/5/2024 1:29 PM EDT    Workstation ID: [id number]                    Results for orders placed during the hospital encounter of 07/05/24    Adult Transthoracic Echo Complete W/ Cont if Necessary Per Protocol    Interpretation Summary    Left ventricular systolic function is normal. Calculated left ventricular EF = 57.7%    Left ventricular wall thickness is consistent with hypertrophy.    Left atrial volume is moderately increased.    There is calcification of the aortic valve.    Moderate mitral valve regurgitation is present.    Moderate tricuspid valve regurgitation is present.    Estimated right ventricular systolic pressure from tricuspid regurgitation is mildly elevated (35-45 mmHg).        Discharge Details        Discharge Medications        New Medications        Instructions Start Date   amLODIPine 5 MG tablet  Commonly known as: NORVASC   5 mg, Oral, Every 24 Hours Scheduled      atorvastatin 40 MG tablet  Commonly known as: LIPITOR   40 mg, Oral, Nightly      insulin glargine 100 UNIT/ML injection  Commonly known as: LANTUS, SEMGLEE   20 Units, Subcutaneous, Daily   Start Date: July 9, 2024     nitroglycerin 0.4 MG/SPRAY spray  Commonly known as: Nitrolingual  Replaces: nitroglycerin 0.4 MG SL tablet   1 spray, Sublingual, Every 5 Minutes PRN      valsartan 40 MG tablet  Commonly known as: DIOVAN   40 mg, Oral, Every 24 Hours Scheduled             Changes to Medications        Instructions Start Date   cefdinir 300 MG capsule  Commonly known as: OMNICEF  What changed:  when to take this   300 mg, Oral, Every 24 Hours Scheduled      isosorbide mononitrate 60 MG 24 hr tablet  Commonly known as: IMDUR  What changed:   medication strength  how much to take  when to take this   120 mg, Oral, Every 24 Hours Scheduled      metoprolol tartrate 25 MG tablet  Commonly known as: LOPRESSOR  What changed: how much to take   50 mg, Oral, 2 Times Daily             Continue These Medications        Instructions Start Date   acetaminophen 500 MG tablet  Commonly known as: TYLENOL   1,000 mg, Oral, 2 Times Daily PRN      aspirin 81 MG EC tablet   81 mg, Oral, Daily      Cranberry 400 MG tablet   1 tablet, Oral, Nightly      fish oil 1200 MG capsule capsule   2,400 mg, Oral, Daily      Flaxseed Oil 1000 MG capsule   2 capsules, Oral, 2 Times Daily      FreeStyle Rony 3 Montara device       FreeStyle Rony 3 Sensor misc       hydrALAZINE 50 MG tablet  Commonly known as: APRESOLINE   50 mg, Oral, 2 Times Daily      levothyroxine 88 MCG tablet  Commonly known as: SYNTHROID, LEVOTHROID   88 mcg, Oral, Daily      Magnesium 500 MG capsule   1 tablet, Oral, As Needed      multivitamin with minerals tablet tablet   1 tablet, Oral, Daily      omeprazole 20 MG capsule  Commonly known as: priLOSEC   20 mg, Oral, Daily, Every other day      triamcinolone 0.1 % ointment  Commonly known as: KENALOG   1 application , Topical, Daily PRN      vitamin B-12 1000 MCG tablet  Commonly known as: CYANOCOBALAMIN   1,000 mcg, Oral, 4 Times Weekly             Stop These Medications      dilTIAZem  MG 24 hr capsule  Commonly known as: CARDIZEM CD     furosemide 40 MG tablet  Commonly known as: LASIX     glipizide 10 MG tablet  Commonly known as: GLUCOTROL     insulin detemir 100 UNIT/ML injection  Commonly known as: LEVEMIR     nitroglycerin 0.4 MG SL tablet  Commonly known as: NITROSTAT  Replaced by: nitroglycerin 0.4 MG/SPRAY spray     simvastatin 10 MG tablet  Commonly known as: ZOCOR              No Known  Allergies      Discharge Disposition:  Home-Health Care Svc    Discharge Diet:  Diet Order   Procedures    Diet: Cardiac; Healthy Heart (2-3 Na+); Fluid Consistency: Thin (IDDSI 0)         Discharge Activity:   Activity Instructions       Activity as Tolerated      Up WIth Assist     Additional Activity Instructions:    Activity as tolerated, up with assistance             CODE STATUS:    Code Status and Medical Interventions:   Ordered at: 07/05/24 1625     Level Of Support Discussed With:    Patient    Next of Kin (If No Surrogate)     Code Status (Patient has no pulse and is not breathing):    CPR (Attempt to Resuscitate)     Medical Interventions (Patient has pulse or is breathing):    Full Support         Future Appointments   Date Time Provider Department Center   7/12/2024  1:15 PM Yael Ricardo APRN [facility]   8/12/2024  1:00 PM Teo Peoples MD [facility]   6/16/2025  3:30 PM Teo Peoples MD [facility]       Additional Instructions for the Follow-ups that You Need to Schedule       Call MD With Problems / Concerns   As directed      Instructions: Call provider for chest pain, heart palpitations, shortness of breath    Order Comments: Instructions: Call provider for chest pain, heart palpitations, shortness of breath         Discharge Follow-up with PCP   As directed       Currently Documented PCP:    Lizette Fitzpatrick PA    PCP Phone Number:    [phone]     Follow Up Details: Follow up with PCP in 1 week        Discharge Follow-up with Specified Provider: Dr Peoples; 1 Month   As directed      To: Dr Peoples   Follow Up: 1 Month                Time Spent on Discharge:  40 minutes    Electronically signed by AKBAR Reyes, 07/08/24, 8:34 AM EDT.

## 2024-07-08 NOTE — PROGRESS NOTES
Clinical Nutrition Assessment     Patient Name: Ninoska Gloria  YOB: 1931  MRN: [id number]  Date of Encounter: 07/08/24 11:30 EDT  Admission date: 7/5/2024  Reason for Visit: MST score 2+, Unintentional weight loss, Reduced oral intake    Assessment   Nutrition Assessment   Admission Diagnosis:  Chest pain [R07.9]  NSTEMI (non-ST elevated myocardial infarction) [I21.4]  Chronic heart failure with preserved ejection fraction (HFpEF) [I50.32]    Problem List:    Coronary artery disease involving native coronary artery of native heart without angina pectoris    Essential hypertension    Dyslipidemia    Type 2 diabetes mellitus without complication, with long-term current use of insulin    Paroxysmal atrial fibrillation    Cirrhosis of liver without ascites    Stage 3b chronic kidney disease    Chronic diastolic CHF (congestive heart failure)    GERD without esophagitis    NSTEMI (non-ST elevated myocardial infarction)    Nonischemic nontraumatic myocardial injury    Chronic heart failure with preserved ejection fraction (HFpEF)      PMH:   She  has a past medical history of Abnormal ECG, Anemia, Anxiety and depression, Arrhythmia, Atrial fibrillation, Back pain, Cancer, CHF (congestive heart failure), Cirrhosis, CKD (chronic kidney disease), stage III, Constipation, COPD (chronic obstructive pulmonary disease), Coronary artery disease, COVID-19 (09/2021), Diabetes mellitus (1990's), Diarrhea, Disease of thyroid gland, Elevated cholesterol, Full dentures, GERD (gastroesophageal reflux disease), Headache, Heart murmur, High triglycerides, History of blood transfusion, History of bronchitis, History of pneumonia, [place] (hard of hearing), Hyperlipidemia, Hypertension (1948), Iron deficiency (1996), Myocardial infarction (1996), Osteoarthritis, Ovarian cyst, Pneumonia, Retinal detachment (2009), Shingles, SOB (shortness of breath), and Vision problems.    PSH:  She  has a past surgical history that  "includes  section; Cataract extraction (Bilateral, 2008, ); Tubal ligation (); Cardiac catheterization (); Colonoscopy; Mouth surgery; Esophagogastroduodenoscopy (N/A, 2021); Coronary stent placement (); and Skin cancer excision.    Applicable Nutrition History:       Anthropometrics     Height: Height: 149.9 cm (59\")  Last Filed Weight: Weight: 55.1 kg (121 lb 7.6 oz) (24 0510)  Method: Weight Method: Bed scale  BMI: BMI (Calculated): 24.5    UBW:  130lbs per EMR  Weight change: weight loss of 10 lbs (7.7%) over the past 3 month(s)    Significant?  Yes   Weight       Weight (kg) Weight (lbs) Weight Method Visit Report   2022 58.514 kg  129 lb   --    10/18/2023 59.421 kg  131 lb   --    3/11/2024 58.968 kg  130 lb   --    2024 58.968 kg  130 lb  Stated     2024 56.7 kg  125 lb  Stated     2024 55.4 kg  122 lb 2.2 oz  Bed scale     2024 55.1 kg  121 lb 7.6 oz      2024 55.1 kg  121 lb 7.6 oz  Bed scale       Nutrition Focused Physical Exam    Date:     Unable to perform due to Pt unable to participate at time of visit     Subjective   Reported/Observed/Food/Nutrition Related History:     Pt sleeping at time of visit, nutrition hx obtained from pt's dtr at bedside. Pt has had poor intake (eating ~50% of usual) x ~2 months 2/2 GI distress (diarrhea and gas). Dtr states she and her sister prepare meals for pt and have noticed pt only eating a few bites at each meal. Dtr also notes pt's intake has been much better during admit vs at home. Dtr states pt does not enjoy ONS but will continue to encourage use at home. No chewing/swallowing difficulties and NKFA.     Current Nutrition Prescription   PO: Diet: Cardiac; Healthy Heart (2-3 Na+); Fluid Consistency: Thin (IDDSI 0)  Oral Nutrition Supplement:   Intake:  50/100/50% x 3 meals    Assessment & Plan   Nutrition Diagnosis   Date:              Updated:    Problem Malnutrition severe chronic   Etiology " Energy in < energy out 2/2 GI distress   Signs/Symptoms 10lb/7.7% wt loss x 3 months, po intake <75% EEN x>/=1 month   Status: New      Goal / Objectives:   Nutrition to support treatment and Maintain intake, Continue positive trend      Nutrition Intervention      Follow treatment progress, Care plan reviewed, Encourage intake, Supplement provided    Pt meets criteria for severe malnutrition in the context of chronic illness indicated by po intake <75% EEN x>/=1 month, wt loss >7.5% (10lb/7.7%) x 3 months.   Encourage po intake  ONS ordered w/lunch today. Recommend continue ONS at discharge     Monitoring/Evaluation:   Per protocol, PO intake, Supplement intake, Weight, GI status, Symptoms    Julia Jessica, MS,RD,LD  Time Spent:25

## 2024-07-08 NOTE — OUTREACH NOTE
Prep Survey      Flowsheet Row Responses   [facility] facility patient discharged from? [facility]   Is LACE score < 7 ? No   Eligibility Readm Mgmt   Discharge diagnosis Chest pain   Does the patient have one of the following disease processes/diagnoses(primary or secondary)? Other   Prep survey completed? Yes            Orquidea MADDOX - Registered Nurse

## 2024-07-08 NOTE — PROGRESS NOTES
Malnutrition Severity Assessment    Patient Name:  Ninoska Gloria  YOB: 1931  MRN: [id number]  Admit Date:  7/5/2024    Patient meets criteria for : Severe Malnutrition    Comments:  Pt meets criteria for severe malnutrition in the context of chronic illness indicated by po intake <75% EEN x>/=1 month, wt loss >7.5% (10lb/7.7%) x 3 months.     Malnutrition Severity Assessment  Malnutrition Type: Chronic Disease - Related Malnutrition  Malnutrition Type (Last 8 Hours)       Malnutrition Severity Assessment       Row Name 07/08/24 1137       Malnutrition Severity Assessment    Malnutrition Type Chronic Disease - Related Malnutrition      Row Name 07/08/24 1137       Insufficient Energy Intake     Insufficient Energy Intake Findings Severe    Insufficient Energy Intake  <75% of est. energy requirement for > or equal to 1 month      Row Name 07/08/24 1137       Unintentional Weight Loss     Unintentional Weight Loss Findings Severe    Unintentional Weight Loss  Weight loss greater than 7.5% in three months      Row Name 07/08/24 1137       Criteria Met (Must meet criteria for severity in at least 2 of these categories: M Wasting, Fat Loss, Fluid, Secondary Signs, Wt. Status, Intake)    Patient meets criteria for  Severe Malnutrition                    Electronically signed by:  Julia Jessica MS,RD,KENNEDI  07/08/24 11:37 EDT

## 2024-07-08 NOTE — PLAN OF CARE
Goal Outcome Evaluation:  Plan of Care Reviewed With: patient           Outcome Evaluation: Pt with discharge orders , family to provide ride

## 2024-07-08 NOTE — CASE MANAGEMENT/SOCIAL WORK
Discharge Planning Assessment  [facility]     Patient Name: Ninoska Gloria  MRN: [id number]  Today's Date: 7/8/2024    Admit Date: 7/5/2024    Plan: discharge plan   Discharge Needs Assessment       Row Name 07/08/24 1322       Living Environment    People in Home other (see comments)    Unique Family Situation Adult daughters, Ira and Virginia take turns staying with pt in [facility]    Living Arrangement Comments I spoke with pt's daughter, Virginia in room regarding discharge plan. Pt sleeping. Pt resides in a home in [facility] and daughters take turns staying with pt.       Transition Planning    Patient/Family Anticipates Transition to home with family    Patient/Family Anticipated Services at Transition        Discharge Needs Assessment    Readmission Within the Last 30 Days no previous admission in last 30 days    Equipment Currently Used at Home wheelchair;walker, rolling;rollator;oxygen;respiratory supplies  O2 at (daughter unsure of O2 company name)    Concerns to be Addressed discharge planning    Equipment Needed After Discharge respiratory supplies;oxygen;rollator;walker, rolling;wheelchair, manual    Discharge Coordination/Progress Pt's daughter, Virginia confirms that pt has [place] [employer] Replacement insurance with prescription coverage. Pt uses [employer] in [facility]. Daughter reports pt is independent with ADLs using mostly a quad cane. The daughters use a w/c for pt when out of the home. Pt is here with chest pain and diagnosis of NSTEMI and cards was consulted. Plan is home with daughters to assist, denying discharge needs. Ira will provide transportation home.                   Discharge Plan       Row Name 07/08/24 132       Plan    Plan discharge plan    Plan Comments Pt is here with chest pain and diagnosis of NSTEMI and cards was consulted. Plan is home with daughters to assist, denying discharge needs. Ira will provide transportation home.    Final Discharge  Disposition Code 01 - home or self-care                  Continued Care and Services - Admitted Since 7/5/2024    No active coordination exists for this encounter.       Expected Discharge Date and Time       Expected Discharge Date Expected Discharge Time    Jul 8, 2024            Demographic Summary       Row Name 07/08/24 0921       General Information    General Information Comments PCP is ANSLEY ROSEN       Contact Information    Permission Granted to Share Info With     Contact Information Obtained for     Contact Information Comments Ira Morales (daughter) [phone] or Dena Gaming(daughter) [phone]                   Functional Status    No documentation.                  Psychosocial    No documentation.                  Abuse/Neglect    No documentation.                  Legal    No documentation.                  Substance Abuse    No documentation.                  Patient Forms    No documentation.                     Perla Francisco RN

## 2024-07-09 ENCOUNTER — NURSE TRIAGE (OUTPATIENT)
Dept: CALL CENTER | Facility: HOSPITAL | Age: 89
End: 2024-07-09
Payer: MEDICARE

## 2024-07-09 NOTE — TELEPHONE ENCOUNTER
Patient discharged 07/08/2024. Pharmacy did not receive Rx for Glargine insulin.    Review of EPIC chart (AVS and D/C summary) indicates Rx for:    insulin glargine (LANTUS, SEMGLEE) 100 UNIT/ML injection [[id number]]    Order Details  Dose: 20 Units Route: Subcutaneous Frequency: Daily   Dispense Quantity: -- Refills: --          Sig: Inject 20 Units under the skin into the appropriate area as directed Daily.         Start Date: 07/09/24 End Date: --   Written Date: 07/08/24 Expiration Date: 07/08/25   Providers      Ordering Provider and Authorizing Provider:  Cherie Geronimo APRN       This RX called to [facility] as requested pharmacy .  Reason for Disposition   [1] Prescription prescribed recently is not at pharmacy AND [2] triager has access to patient's EMR AND [3] prescription is recorded in the EMR    Additional Information   Negative: [1] Intentional drug overdose AND [2] suicidal thoughts or ideas   Negative: Drug overdose and triager unable to answer question   Negative: Caller requesting a renewal or refill of a medicine patient is currently taking   Negative: Caller requesting information unrelated to medicine   Negative: Caller requesting information about COVID-19 Vaccine   Negative: Caller requesting information about Emergency Contraception   Negative: Caller requesting information about Combined Birth Control Pills   Negative: Caller requesting information about Progestin Birth Control Pills   Negative: Caller requesting information about Post-Op pain or medicines   Negative: Caller requesting a prescription antibiotic (such as Penicillin) for Strep throat and has a positive culture result   Negative: Caller requesting a prescription anti-viral med (such as Tamiflu) and has influenza (flu) symptoms   Negative: Immunization reaction suspected   Negative: Rash while taking a medicine or within 3 days of stopping it   Negative: [1] Asthma and [2] having symptoms of asthma (cough, wheezing,  etc.)   Negative: [1] Symptom of illness (e.g., headache, abdominal pain, earache, vomiting) AND [2] more than mild   Negative: Breastfeeding questions about mother's medicines and diet   Negative: MORE THAN A DOUBLE DOSE of a prescription or over-the-counter (OTC) drug   Negative: [1] DOUBLE DOSE (an extra dose or lesser amount) of prescription drug AND [2] any symptoms (e.g., dizziness, nausea, pain, sleepiness)   Negative: [1] DOUBLE DOSE (an extra dose or lesser amount) of over-the-counter (OTC) drug AND [2] any symptoms (e.g., dizziness, nausea, pain, sleepiness)   Negative: Took another person's prescription drug   Negative: [1] DOUBLE DOSE (an extra dose or lesser amount) of prescription drug AND [2] NO symptoms  (Exception: A double dose of antibiotics.)   Negative: Diabetes drug error or overdose (e.g., took wrong type of insulin or took extra dose)   Negative: [1] Prescription not at pharmacy AND [2] was prescribed by PCP recently (Exception: Triager has access to EMR and prescription is recorded there. Go to Home Care and confirm for pharmacy.)   Negative: [1] Pharmacy calling with prescription question AND [2] triager unable to answer question   Negative: [1] Caller has URGENT medicine question about med that PCP or specialist prescribed AND [2] triager unable to answer question   Negative: Medicine patch causing local rash or itching   Negative: [1] Caller has medicine question about med NOT prescribed by PCP AND [2] triager unable to answer question (e.g., compatibility with other med, storage)   Negative: Prescription request for new medicine (not a refill)   Negative: [1] Caller has NON-URGENT medicine question about med that PCP prescribed AND [2] triager unable to answer question   Negative: Caller wants to use a complementary or alternative medicine   Negative: [1] DOUBLE DOSE (an extra dose or lesser amount) of over-the-counter (OTC) drug AND [2] NO symptoms    Answer Assessment - Initial  "Assessment Questions  1. NAME of MEDICINE: \"What medicine(s) are you calling about?\"      Insulin Glargine  2. QUESTION: \"What is your question?\" (e.g., double dose of medicine, side effect)      Discharged from hospital yesterday and medication not at pharmacy  3. PRESCRIBER: \"Who prescribed the medicine?\" Reason: if prescribed by specialist, call should be referred to that group.      Cherie Geronimo, APRN  4. SYMPTOMS: \"Do you have any symptoms?\" If Yes, ask: \"What symptoms are you having?\"  \"How bad are the symptoms (e.g., mild, moderate, severe)      Denies  5. PREGNANCY:  \"Is there any chance that you are pregnant?\" \"When was your last menstrual period?\"      N/A    Protocols used: Medication Question Call-ADULT-AH    "

## 2024-07-11 ENCOUNTER — READMISSION MANAGEMENT (OUTPATIENT)
Dept: CALL CENTER | Facility: HOSPITAL | Age: 89
End: 2024-07-11
Payer: MEDICARE

## 2024-07-11 NOTE — OUTREACH NOTE
Medical Week 1 Survey      Flowsheet Row Responses   [facility] patient discharged from? [place]   Does the patient have one of the following disease processes/diagnoses(primary or secondary)? Other   Week 1 attempt successful? Yes   Call start time 1555   Call end time 1556   Discharge diagnosis Chest pain   Meds reviewed with patient/caregiver? Yes   Is the patient having any side effects they believe may be caused by any medication additions or changes? No   Does the patient have all medications ordered at discharge? Yes   Is the patient taking all medications as directed (includes completed medication regime)? Yes   Comments regarding appointments 7/12/24 cards apt   Does the patient have a primary care provider?  Yes   Has the patient kept scheduled appointments due by today? N/A   Did the patient receive a copy of their discharge instructions? Yes   Nursing interventions Reviewed instructions with patient   What is the patient's perception of their health status since discharge? Same   Is the patient/caregiver able to teach back signs and symptoms related to disease process for when to call PCP? Yes   Is the patient/caregiver able to teach back signs and symptoms related to disease process for when to call 911? Yes   Is the patient/caregiver able to teach back the hierarchy of who to call/visit for symptoms/problems? PCP, Specialist, Home health nurse, Urgent Care, ED, 911 Yes   If the patient is a current smoker, are they able to teach back resources for cessation? Not a smoker   Week 1 call completed? Yes   Graduated Yes   Did the patient feel the follow up calls were helpful during their recovery period? Yes   Graduated/Revoked comments Improving per pt   Call end time 1556            Teodora H - Registered Nurse

## 2024-07-12 ENCOUNTER — OFFICE VISIT (OUTPATIENT)
Dept: CARDIOLOGY | Facility: HOSPITAL | Age: 89
End: 2024-07-12
Payer: MEDICARE

## 2024-07-12 VITALS
HEIGHT: 59 IN | TEMPERATURE: 97.8 F | BODY MASS INDEX: 25.23 KG/M2 | RESPIRATION RATE: 17 BRPM | OXYGEN SATURATION: 95 % | HEART RATE: 55 BPM | WEIGHT: 125.13 LBS | SYSTOLIC BLOOD PRESSURE: 151 MMHG | DIASTOLIC BLOOD PRESSURE: 70 MMHG

## 2024-07-12 DIAGNOSIS — E78.2 MIXED HYPERLIPIDEMIA: ICD-10-CM

## 2024-07-12 DIAGNOSIS — I10 PRIMARY HYPERTENSION: ICD-10-CM

## 2024-07-12 DIAGNOSIS — I25.10 CORONARY ARTERY DISEASE INVOLVING NATIVE CORONARY ARTERY OF NATIVE HEART WITHOUT ANGINA PECTORIS: ICD-10-CM

## 2024-07-12 DIAGNOSIS — I50.32 CHRONIC HEART FAILURE WITH PRESERVED EJECTION FRACTION (HFPEF): Primary | ICD-10-CM

## 2024-07-12 PROCEDURE — 1159F MED LIST DOCD IN RCRD: CPT | Performed by: NURSE PRACTITIONER

## 2024-07-12 PROCEDURE — G2211 COMPLEX E/M VISIT ADD ON: HCPCS | Performed by: NURSE PRACTITIONER

## 2024-07-12 PROCEDURE — 1160F RVW MEDS BY RX/DR IN RCRD: CPT | Performed by: NURSE PRACTITIONER

## 2024-07-12 PROCEDURE — 99214 OFFICE O/P EST MOD 30 MIN: CPT | Performed by: NURSE PRACTITIONER

## 2024-07-12 RX ORDER — AMLODIPINE BESYLATE 2.5 MG/1
7.5 TABLET ORAL DAILY
Start: 2024-07-12

## 2024-07-12 NOTE — PROGRESS NOTES
"Chief Complaint  Congestive Heart Failure and Follow-up    Subjective    History of Present Illness {  Problem List  Visit  Diagnosis   Encounters  Notes  Medications  Labs  Result Review Imaging  Media :23}       History of Present Illness   89-year-old female presents the office today for ongoing evaluation of her chronic HFpEF.Patient has a history of hypertension, hyperlipidemia, CAD, A-fib (not on anticoagulation), HFpEF, type 2 diabetes mellitus, chronic kidney disease, ACUNA cirrhosis.  She presented with chest pain epigastric pain that improved with nitro.  Patient was evaluated by cardiology and initiated on nitro drip and therapeutic Lovenox for 48 hours.  Increased Imdur to 120 mg increase metoprolol to 50 mg twice daily added Norvasc 5 mg and stop diltiazem.  Patient also had a UTI will be completing cefdinir.  Baseline creatinine 1.1-1.6   Notes ongoing fatigue and weakness but reports she is starting to feel better.  Currently denies chest pain, dyspnea, dizziness, Presyncope or syncope.  Patient reports that BP at home has been running 150s to 160s  Objective     Vital Signs:   Vitals:    07/12/24 1318   BP: 151/70   BP Location: Left arm   Patient Position: Sitting   Cuff Size: Small Adult   Pulse: 55   Resp: 17   Temp: 97.8 °F (36.6 °C)   TempSrc: Temporal   SpO2: 95%   Weight: 56.8 kg (125 lb 2 oz)   Height: 149.9 cm (59\")     Body mass index is 25.27 kg/m².  Physical Exam  Vitals and nursing note reviewed.   Constitutional:       Appearance: Normal appearance.   HENT:      Head: Normocephalic.   Eyes:      Pupils: Pupils are equal, round, and reactive to light.   Cardiovascular:      Rate and Rhythm: Normal rate and regular rhythm.      Pulses: Normal pulses.      Heart sounds: Normal heart sounds. No murmur heard.  Pulmonary:      Effort: Pulmonary effort is normal.      Breath sounds: Normal breath sounds.   Abdominal:      General: Bowel sounds are normal.      Palpations: Abdomen is " soft.   Musculoskeletal:         General: Normal range of motion.      Cervical back: Normal range of motion.      Right lower leg: No edema.      Left lower leg: No edema.   Skin:     General: Skin is warm and dry.      Capillary Refill: Capillary refill takes less than 2 seconds.   Neurological:      Mental Status: She is alert and oriented to person, place, and time.   Psychiatric:         Mood and Affect: Mood normal.         Thought Content: Thought content normal.              Result Review  Data Reviewed:{ Labs  Result Review  Imaging  Med Tab  Media :23}   Adult Transthoracic Echo Complete W/ Cont if Necessary Per Protocol (07/06/2024 09:05)  ECG 12 Lead ED Triage Standing Order; Chest Pain (07/05/2024 15:30)  ECG 12 Lead ED Triage Standing Order; Chest Pain (07/05/2024 12:56)  ECG 12 Lead Dyspnea (07/01/2024 16:33)  Basic Metabolic Panel (07/06/2024 04:23)  CBC Auto Differential (07/06/2024 04:23)  Hemoglobin A1c (07/06/2024 04:23)  Lipid Panel (07/06/2024 04:23)  TSH (07/06/2024 04:23)         Assessment and Plan {CC Problem List  Visit Diagnosis  ROS  Review (Popup)  Health Maintenance  Quality  BestPractice  Medications  SmartSets  SnapShot Encounters  Media :23}   1. Chronic heart failure with preserved ejection fraction (HFpEF)  Euvolemic  Heart failure education today including signs and symptoms, the role of the heart failure center, daily weights, low sodium diet (less than 1500 mg per day), and daily physical activity. Reviewed HF Zones with patient and family.  Patient to continue current medications as previously ordered.     2. Primary hypertension    Blood pressure currently not at goal  Continue hydralazine, Imdur, Lopressor, valsartan  Increase amlodipine to 7.5 mg daily  Continue to monitor closely  3. Mixed hyperlipidemia  Stable on lipitor   4. Coronary artery disease involving native coronary artery of native heart without angina pectoris  currently without  angina  Continue Imdur, aspirin, Lipitor, Lopressor        Follow Up {Instructions Charge Capture  Follow-up Communications :23}   Return if symptoms worsen or fail to improve.    Patient was given instructions and counseling regarding her condition or for health maintenance advice. Please see specific information pulled into the AVS if appropriate.  Patient was instructed to call the [facility] with any questions, concerns, or worsening symptoms.

## 2024-08-12 ENCOUNTER — OFFICE VISIT (OUTPATIENT)
Dept: CARDIOLOGY | Facility: CLINIC | Age: 89
End: 2024-08-12
Payer: MEDICARE

## 2024-08-12 VITALS
BODY MASS INDEX: 25.36 KG/M2 | OXYGEN SATURATION: 93 % | WEIGHT: 125.8 LBS | HEIGHT: 59 IN | DIASTOLIC BLOOD PRESSURE: 60 MMHG | SYSTOLIC BLOOD PRESSURE: 118 MMHG | HEART RATE: 60 BPM

## 2024-08-12 DIAGNOSIS — I48.0 PAROXYSMAL ATRIAL FIBRILLATION: ICD-10-CM

## 2024-08-12 DIAGNOSIS — I50.32 CHRONIC DIASTOLIC CHF (CONGESTIVE HEART FAILURE): Primary | ICD-10-CM

## 2024-08-12 DIAGNOSIS — I10 ESSENTIAL HYPERTENSION: ICD-10-CM

## 2024-08-12 DIAGNOSIS — E78.5 DYSLIPIDEMIA: ICD-10-CM

## 2024-08-12 DIAGNOSIS — I25.10 CORONARY ARTERY DISEASE INVOLVING NATIVE CORONARY ARTERY OF NATIVE HEART WITHOUT ANGINA PECTORIS: ICD-10-CM

## 2024-08-12 PROCEDURE — 99214 OFFICE O/P EST MOD 30 MIN: CPT | Performed by: INTERNAL MEDICINE

## 2024-08-12 RX ORDER — AMLODIPINE BESYLATE 5 MG/1
5 TABLET ORAL
Qty: 30 TABLET | Refills: 0 | Status: SHIPPED | OUTPATIENT
Start: 2024-08-12

## 2024-08-12 RX ORDER — AMLODIPINE BESYLATE 2.5 MG/1
2.5 TABLET ORAL DAILY
Qty: 30 TABLET | Refills: 11 | Status: SHIPPED | OUTPATIENT
Start: 2024-08-12

## 2024-08-12 NOTE — PROGRESS NOTES
[facility] Cardiology  Office Progress Note  Ninoska Gloria  2/8/1931  [address] [facility] [address]       Visit Date: 08/12/24    PCP: Lizette Fitzpatrick, PA  [address] [id number]    IDENTIFICATION: A 89 y.o.  female from [place], [place]    PROBLEM LIST:   CAD  Inferior MI 1996 s/p tpa  stent to RCA and LAD 1996 Dr. Velasquez  stress Echo 2006 - WNL  Echo 8/2012 - normal LV function, no significant valvular abnormalities  8/16/13 MPS: Abnormal large sized severely fixed inferior defect, moderate size moderately fixed lateral defect with no reversibility, EF 45%  Pulmonary hypertension  4/17/18 echo: EF 56%, mild concentric LVH, grade 1 diastolic dysfunction, mild calcification of the aortic valve, mild MR, mild TR, RVSP 79 mmHg, small pericardial effusion  CT chest - 2020 bronchiectasis  7/24 echo EF greater than 55 AV sclerosis mild MR TR RVSP 40  A-fib/flutter  [id number] 6 no AC due to falls  Hypertension  Dyslipidemia  DM 2, on insulin  4/19/18 A1c 8.7  CKD  7/23 1.4 Dr Gonzalez  Hypothyroidism  GERD  History of retinal detachment  Frequent falls  Diverticulosis  ACUNA  Cholelithiasis  Surgical history  5 c section          CC:   Chief Complaint   Patient presents with    Other forms of angina pectoris       Allergies  No Known Allergies    Current Medications  Current Outpatient Medications   Medication Instructions    acetaminophen (TYLENOL) 1,000 mg, Oral, 2 Times Daily PRN    amLODIPine (NORVASC) 7.5 mg, Oral, Daily    amLODIPine (NORVASC) 5 mg, Oral, Every 24 Hours Scheduled    amLODIPine (NORVASC) 2.5 mg, Oral, Daily    aspirin 81 mg, Oral, Daily    atorvastatin (LIPITOR) 40 mg, Oral, Nightly    Continuous Glucose  (FreeStyle Rony 3 [place]) device     Continuous Glucose Sensor (FreeStyle Rony 3 Sensor) misc     Cranberry 400 MG tablet 1 tablet, Oral, Nightly    fish oil 2,400 mg, Oral, Daily    Flaxseed, Linseed, (Flaxseed Oil) 1000 MG capsule 2  "capsules, Oral, 2 Times Daily    hydrALAZINE (APRESOLINE) 50 mg, Oral, 2 Times Daily    insulin glargine (LANTUS, SEMGLEE) 20 Units, Subcutaneous, Daily    isosorbide mononitrate (IMDUR) 120 mg, Oral, Every 24 Hours Scheduled    levothyroxine (SYNTHROID, LEVOTHROID) 88 mcg, Oral, Daily    Magnesium 500 MG capsule 1 tablet, Oral, As Needed    metoprolol tartrate (LOPRESSOR) 50 mg, Oral, 2 Times Daily    multivitamin with minerals (MULTIVITAMIN ADULTS 50+ PO) 1 tablet, Oral, Daily    nitroglycerin (Nitrolingual) 0.4 MG/SPRAY spray 1 spray, Sublingual, Every 5 Minutes PRN    omeprazole (PRILOSEC) 20 mg, Oral, Daily, Every other day    triamcinolone (KENALOG) 0.1 % ointment 1 application , Topical, Daily PRN    valsartan (DIOVAN) 40 mg, Oral, Every 24 Hours Scheduled    vitamin B-12 (CYANOCOBALAMIN) 1,000 mcg, Oral, 4 Times Weekly        History of Present Illness   Ninoska Gloria is a 89 y.o. year old female here for follow up.    He was hospitalized at [facility] 7/5 through 7/8/2024 with non-ST MI HFpEF.  Patient with peak troponin of 82 BNP of 3885.  Occasional shortness of breath intermittent utilization of during the day.      OBJECTIVE:  Vitals:    08/12/24 1308   BP: 118/60   BP Location: Left arm   Patient Position: Sitting   Cuff Size: Adult   Pulse: 60   SpO2: 93%   Weight: 57.1 kg (125 lb 12.8 oz)   Height: 149.9 cm (59\")         Body mass index is 25.41 kg/m².    Constitutional:       Appearance: Healthy appearance. Not in distress.   Neck:      Vascular: No JVR. JVD normal.   Pulmonary:      Effort: Pulmonary effort is normal.      Breath sounds: Normal breath sounds. No wheezing. No rhonchi. No rales.   Chest:      Chest wall: Not tender to palpatation.   Cardiovascular:      PMI at left midclavicular line. Normal rate. Regular rhythm. Normal S1. Normal S2.       Murmurs: There is a systolic murmur.      No gallop.  No click. No rub.   Pulses:     Intact distal pulses.   Edema:     Peripheral edema " present.  Abdominal:      General: Bowel sounds are normal.      Palpations: Abdomen is soft.      Tenderness: There is no abdominal tenderness.   Musculoskeletal: Normal range of motion.         General: No tenderness. Skin:     General: Skin is warm and dry.   Neurological:      General: No focal deficit present.      Mental Status: Alert and oriented to person, place and time.         Diagnostic Data:  Procedures        ASSESSMENT:   Diagnosis Plan   1. Chronic diastolic CHF (congestive heart failure)        2. Coronary artery disease involving native coronary artery of native heart without angina pectoris        3. Essential hypertension        4. Dyslipidemia        5. Paroxysmal atrial fibrillation                PLAN:  HFpEF-continue sodium restriction and GDMT she will utilize heart valve center for IV diuresis(in [place])  if crescendo    Stable angina we will advance antianginal medications with Imdur 30.    Hypertension controlled currently on hydralazine Toprol diltiazem    Mixed dyslipidemia controlled on statin therapy    Diabetes insulin requiring pending recent A1c          Teo Peoples MD, FACC

## 2025-05-19 ENCOUNTER — TRANSCRIBE ORDERS (OUTPATIENT)
Facility: HOSPITAL | Age: OVER 89
End: 2025-05-19

## 2025-05-19 DIAGNOSIS — N18.30 STAGE 3 CHRONIC KIDNEY DISEASE, UNSPECIFIED WHETHER STAGE 3A OR 3B CKD: Primary | ICD-10-CM

## 2025-05-28 DIAGNOSIS — D50.9 IRON DEFICIENCY ANEMIA, UNSPECIFIED IRON DEFICIENCY ANEMIA TYPE: Primary | ICD-10-CM

## 2025-05-28 DIAGNOSIS — N18.30 ANEMIA DUE TO STAGE 3 CHRONIC KIDNEY DISEASE TREATED WITH ERYTHROPOIETIN (HCC): Primary | ICD-10-CM

## 2025-05-28 DIAGNOSIS — D63.1 ANEMIA DUE TO STAGE 3 CHRONIC KIDNEY DISEASE TREATED WITH ERYTHROPOIETIN (HCC): Primary | ICD-10-CM

## 2025-05-28 RX ORDER — SODIUM CHLORIDE 9 MG/ML
INJECTION, SOLUTION INTRAVENOUS CONTINUOUS
OUTPATIENT
Start: 2025-05-28

## 2025-05-28 RX ORDER — EPINEPHRINE 1 MG/ML
0.3 INJECTION, SOLUTION INTRAMUSCULAR; SUBCUTANEOUS PRN
OUTPATIENT
Start: 2025-05-28

## 2025-05-28 RX ORDER — ACETAMINOPHEN 325 MG/1
650 TABLET ORAL
OUTPATIENT
Start: 2025-05-28

## 2025-05-28 RX ORDER — SODIUM CHLORIDE 9 MG/ML
5-250 INJECTION, SOLUTION INTRAVENOUS PRN
Status: CANCELLED | OUTPATIENT
Start: 2025-05-28

## 2025-05-28 RX ORDER — ONDANSETRON 2 MG/ML
8 INJECTION INTRAMUSCULAR; INTRAVENOUS
OUTPATIENT
Start: 2025-05-28

## 2025-05-28 RX ORDER — SODIUM CHLORIDE 0.9 % (FLUSH) 0.9 %
5-40 SYRINGE (ML) INJECTION PRN
Status: CANCELLED | OUTPATIENT
Start: 2025-05-28

## 2025-05-28 RX ORDER — HYDROCORTISONE SODIUM SUCCINATE 100 MG/2ML
100 INJECTION INTRAMUSCULAR; INTRAVENOUS
OUTPATIENT
Start: 2025-05-28

## 2025-05-28 RX ORDER — DIPHENHYDRAMINE HYDROCHLORIDE 50 MG/ML
50 INJECTION, SOLUTION INTRAMUSCULAR; INTRAVENOUS
OUTPATIENT
Start: 2025-05-28

## 2025-05-28 RX ORDER — ALBUTEROL SULFATE 0.83 MG/ML
2.5 SOLUTION RESPIRATORY (INHALATION)
OUTPATIENT
Start: 2025-05-28

## 2025-05-30 ENCOUNTER — HOSPITAL ENCOUNTER (OUTPATIENT)
Dept: NURSING | Facility: HOSPITAL | Age: 89
Setting detail: INFUSION SERIES
End: 2025-05-30
Payer: MEDICARE

## 2025-05-30 VITALS
RESPIRATION RATE: 18 BRPM | DIASTOLIC BLOOD PRESSURE: 77 MMHG | OXYGEN SATURATION: 93 % | SYSTOLIC BLOOD PRESSURE: 158 MMHG | TEMPERATURE: 97.7 F | HEART RATE: 64 BPM

## 2025-05-30 DIAGNOSIS — D50.9 IRON DEFICIENCY ANEMIA, UNSPECIFIED IRON DEFICIENCY ANEMIA TYPE: ICD-10-CM

## 2025-05-30 DIAGNOSIS — N18.30 ANEMIA DUE TO STAGE 3 CHRONIC KIDNEY DISEASE TREATED WITH ERYTHROPOIETIN (HCC): Primary | ICD-10-CM

## 2025-05-30 DIAGNOSIS — D63.1 ANEMIA DUE TO STAGE 3 CHRONIC KIDNEY DISEASE TREATED WITH ERYTHROPOIETIN (HCC): Primary | ICD-10-CM

## 2025-05-30 PROCEDURE — 2580000003 HC RX 258

## 2025-05-30 PROCEDURE — 6360000002 HC RX W HCPCS

## 2025-05-30 PROCEDURE — 96365 THER/PROPH/DIAG IV INF INIT: CPT | Performed by: NURSE PRACTITIONER

## 2025-05-30 RX ORDER — SODIUM CHLORIDE 9 MG/ML
5-250 INJECTION, SOLUTION INTRAVENOUS PRN
OUTPATIENT
Start: 2025-06-06

## 2025-05-30 RX ORDER — HYDROCORTISONE SODIUM SUCCINATE 100 MG/2ML
100 INJECTION INTRAMUSCULAR; INTRAVENOUS
OUTPATIENT
Start: 2025-06-06

## 2025-05-30 RX ORDER — ALBUTEROL SULFATE 0.83 MG/ML
2.5 SOLUTION RESPIRATORY (INHALATION)
OUTPATIENT
Start: 2025-06-06

## 2025-05-30 RX ORDER — DIPHENHYDRAMINE HYDROCHLORIDE 50 MG/ML
50 INJECTION, SOLUTION INTRAMUSCULAR; INTRAVENOUS
OUTPATIENT
Start: 2025-06-06

## 2025-05-30 RX ORDER — SODIUM CHLORIDE 9 MG/ML
INJECTION, SOLUTION INTRAVENOUS CONTINUOUS
OUTPATIENT
Start: 2025-06-06

## 2025-05-30 RX ORDER — SODIUM CHLORIDE 0.9 % (FLUSH) 0.9 %
5-40 SYRINGE (ML) INJECTION PRN
OUTPATIENT
Start: 2025-06-06

## 2025-05-30 RX ORDER — SODIUM CHLORIDE 9 MG/ML
5-250 INJECTION, SOLUTION INTRAVENOUS PRN
Status: ACTIVE | OUTPATIENT
Start: 2025-05-30 | End: 2025-05-31

## 2025-05-30 RX ORDER — EPINEPHRINE 1 MG/ML
0.3 INJECTION, SOLUTION INTRAMUSCULAR; SUBCUTANEOUS PRN
OUTPATIENT
Start: 2025-06-06

## 2025-05-30 RX ORDER — SODIUM CHLORIDE 0.9 % (FLUSH) 0.9 %
5-40 SYRINGE (ML) INJECTION PRN
Status: ACTIVE | OUTPATIENT
Start: 2025-05-30 | End: 2025-05-31

## 2025-05-30 RX ORDER — ONDANSETRON 2 MG/ML
8 INJECTION INTRAMUSCULAR; INTRAVENOUS
OUTPATIENT
Start: 2025-06-06

## 2025-05-30 RX ORDER — ACETAMINOPHEN 325 MG/1
650 TABLET ORAL
OUTPATIENT
Start: 2025-06-06

## 2025-05-30 RX ADMIN — FERUMOXYTOL 510 MG: 510 INJECTION INTRAVENOUS at 13:00

## 2025-06-05 ENCOUNTER — TRANSCRIBE ORDERS (OUTPATIENT)
Dept: GENERAL RADIOLOGY | Facility: HOSPITAL | Age: 89
End: 2025-06-05

## 2025-06-05 DIAGNOSIS — R10.9 ABDOMINAL PAIN, UNSPECIFIED ABDOMINAL LOCATION: Primary | ICD-10-CM

## 2025-06-06 ENCOUNTER — HOSPITAL ENCOUNTER (OUTPATIENT)
Dept: ULTRASOUND IMAGING | Facility: HOSPITAL | Age: 89
Discharge: HOME | End: 2025-06-06
Payer: MEDICARE

## 2025-06-06 ENCOUNTER — HOSPITAL ENCOUNTER (OUTPATIENT)
Dept: NURSING | Facility: HOSPITAL | Age: 89
Setting detail: INFUSION SERIES
End: 2025-06-06

## 2025-06-06 DIAGNOSIS — R10.9 ABDOMINAL PAIN, UNSPECIFIED ABDOMINAL LOCATION: ICD-10-CM

## 2025-06-06 PROCEDURE — 76705 ECHO EXAM OF ABDOMEN: CPT

## 2025-06-13 ENCOUNTER — TELEPHONE (OUTPATIENT)
Dept: CARDIOLOGY | Facility: CLINIC | Age: OVER 89
End: 2025-06-13
Payer: MEDICARE

## 2025-06-16 ENCOUNTER — OFFICE VISIT (OUTPATIENT)
Dept: CARDIOLOGY | Facility: CLINIC | Age: OVER 89
End: 2025-06-16
Payer: MEDICARE

## 2025-06-16 VITALS
WEIGHT: 114 LBS | OXYGEN SATURATION: 95 % | BODY MASS INDEX: 25.64 KG/M2 | HEART RATE: 52 BPM | HEIGHT: 56 IN | DIASTOLIC BLOOD PRESSURE: 58 MMHG | SYSTOLIC BLOOD PRESSURE: 118 MMHG

## 2025-06-16 DIAGNOSIS — E78.5 DYSLIPIDEMIA: ICD-10-CM

## 2025-06-16 DIAGNOSIS — E78.2 MIXED HYPERLIPIDEMIA: ICD-10-CM

## 2025-06-16 DIAGNOSIS — E11.9 TYPE 2 DIABETES MELLITUS WITHOUT COMPLICATION, WITHOUT LONG-TERM CURRENT USE OF INSULIN: ICD-10-CM

## 2025-06-16 DIAGNOSIS — I50.32 CHRONIC DIASTOLIC CHF (CONGESTIVE HEART FAILURE): Primary | ICD-10-CM

## 2025-06-16 PROCEDURE — 99214 OFFICE O/P EST MOD 30 MIN: CPT | Performed by: INTERNAL MEDICINE

## 2025-06-16 RX ORDER — FUROSEMIDE 40 MG/1
40 TABLET ORAL DAILY
COMMUNITY

## 2025-06-16 RX ORDER — TRAMADOL HYDROCHLORIDE 50 MG/1
TABLET ORAL
COMMUNITY
Start: 2025-04-04

## 2025-06-16 RX ORDER — METOPROLOL TARTRATE 25 MG/1
25 TABLET, FILM COATED ORAL
COMMUNITY
Start: 2025-03-06

## 2025-06-16 NOTE — PROGRESS NOTES
[facility] Cardiology  Office Progress Note  Ninoska Gloria  2/8/1931  [address] [facility] [address]       Visit Date: 06/16/25    PCP: Lizette Fitzpatrick, PA  [address] [id number]    IDENTIFICATION: A 89 y.o.  female from [place], [place]    PROBLEM LIST:   CAD  Inferior MI 1996 s/p tpa  stent to RCA and LAD 1996 Dr. Velasquez  stress Echo 2006 - WNL  Echo 8/2012 - normal LV function, no significant valvular abnormalities  8/16/13 MPS: Abnormal large sized severely fixed inferior defect, moderate size moderately fixed lateral defect with no reversibility, EF 45%  7/24 NSTEMI BHR peak troponin 82(pBNP 3235)-med management  Pulmonary hypertension  4/17/18 echo: EF 56%, mild concentric LVH, grade 1 diastolic dysfunction, mild calcification of the aortic valve, mild MR, mild TR, RVSP 79 mmHg, small pericardial effusion  CT chest - 2020 bronchiectasis  7/24 echo EF greater than 55 AV sclerosis mild MR TR RVSP 40  A-fib/flutter  [id number] 6 no AC due to falls  Hypertension  Dyslipidemia  DM 2, on insulin  4/19/18 A1c 8.7  CKD  7/23 1.4 Dr Gonzalez  Hypothyroidism  GERD  History of retinal detachment  Frequent falls  Diverticulosis  ACUNA  Cholelithiasis  Chronic anemia-iron oral and infusion intolerant  Surgical history  5 c section          CC:   Chief Complaint   Patient presents with    Chronic diastolic CHF (congestive heart failure)       Allergies  No Known Allergies    Current Medications  Current Outpatient Medications   Medication Instructions    acetaminophen (TYLENOL) 1,000 mg, Oral, 2 Times Daily PRN    amLODIPine (NORVASC) 7.5 mg, Oral, Daily    amLODIPine (NORVASC) 5 mg, Oral, Every 24 Hours Scheduled    amLODIPine (NORVASC) 2.5 mg, Oral, Daily    aspirin 81 mg, Oral, Daily    atorvastatin (LIPITOR) 40 mg, Oral, Nightly    Continuous Glucose  (FreeStyle Rony 3 [place]) device     Continuous Glucose Sensor (FreeStyle Rony 3 Sensor) misc     Cranberry 400  MG tablet 1 tablet, Oral, Nightly    fish oil 2,400 mg, Oral, Daily    Flaxseed, Linseed, (Flaxseed Oil) 1000 MG capsule 2 capsules, Oral, 2 Times Daily    hydrALAZINE (APRESOLINE) 50 mg, Oral, 2 Times Daily    insulin glargine (LANTUS, SEMGLEE) 20 Units, Subcutaneous, Daily    isosorbide mononitrate (IMDUR) 120 mg, Oral, Every 24 Hours Scheduled    levothyroxine (SYNTHROID, LEVOTHROID) 88 mcg, Oral, Daily    Magnesium 500 MG capsule 1 tablet, Oral, As Needed    metoprolol tartrate (LOPRESSOR) 50 mg, Oral, 2 Times Daily    multivitamin with minerals (MULTIVITAMIN ADULTS 50+ PO) 1 tablet, Oral, Daily    nitroglycerin (Nitrolingual) 0.4 MG/SPRAY spray 1 spray, Sublingual, Every 5 Minutes PRN    omeprazole (PRILOSEC) 20 mg, Oral, Daily, Every other day    triamcinolone (KENALOG) 0.1 % ointment 1 application , Topical, Daily PRN    valsartan (DIOVAN) 40 mg, Oral, Every 24 Hours Scheduled    vitamin B-12 (CYANOCOBALAMIN) 1,000 mcg, Oral, 4 Times Weekly        History of Present Illness   Ninoska Gloria is a 89 y.o. year old female here for follow up.    No new cardiac issues she has remained hospital free.  She has a complication with iron infusion according to her family member.      OBJECTIVE:  There were no vitals filed for this visit.        There is no height or weight on file to calculate BMI.    Constitutional:       Appearance: Healthy appearance. Not in distress.   Neck:      Vascular: No JVR. JVD normal.   Pulmonary:      Effort: Pulmonary effort is normal.      Breath sounds: Normal breath sounds. No wheezing. No rhonchi. No rales.   Chest:      Chest wall: Not tender to palpatation.   Cardiovascular:      PMI at left midclavicular line. Normal rate. Regular rhythm. Normal S1. Normal S2.       Murmurs: There is a systolic murmur.      No gallop.  No click. No rub.   Pulses:     Intact distal pulses.   Edema:     Peripheral edema present.  Abdominal:      General: Bowel sounds are normal.      Palpations:  Abdomen is soft.      Tenderness: There is no abdominal tenderness.   Musculoskeletal: Normal range of motion.         General: No tenderness. Skin:     General: Skin is warm and dry.   Neurological:      General: No focal deficit present.      Mental Status: Alert and oriented to person, place and time.         Diagnostic Data:  Procedures        ASSESSMENT:   Diagnosis Plan   1. Chronic diastolic CHF (congestive heart failure)        2. Dyslipidemia        3. Mixed hyperlipidemia        4. Type 2 diabetes mellitus without complication, without long-term current use of insulin                  PLAN:  HFpEF-continue sodium restriction and GDMT she will utilize heart valve center for IV diuresis(in [place])  if crescendo    Stable angina we will advance antianginal medications with Imdur 30.    Hypertension controlled currently on hydralazine Toprol diltiazem    Mixed dyslipidemia controlled on statin therapy    Diabetes insulin requiring pending recent A1c          Teo Peoples MD, FACC

## (undated) DEVICE — HYBRID TUBING/CAP SET FOR OLYMPUS® SCOPES: Brand: ERBE

## (undated) DEVICE — Device

## (undated) DEVICE — FRCP BIOP COLD ENDOJAW ALLGTR W/NDL 2.8X2300MM BLU

## (undated) DEVICE — SUCTION CANISTER, 1500CC, RIGID: Brand: DEROYAL

## (undated) DEVICE — ENDOSCOPY PORT CONNECTOR FOR OLYMPUS® SCOPES: Brand: ERBE

## (undated) DEVICE — VLV SXN AIR/H2O ORCAPOD3 1P/U STRL

## (undated) DEVICE — CONMED SCOPE SAVER BITE BLOCK, 20X27 MM: Brand: SCOPE SAVER